# Patient Record
Sex: FEMALE | Race: WHITE | URBAN - METROPOLITAN AREA
[De-identification: names, ages, dates, MRNs, and addresses within clinical notes are randomized per-mention and may not be internally consistent; named-entity substitution may affect disease eponyms.]

---

## 2018-06-30 ENCOUNTER — ANESTHESIA (OUTPATIENT)
Dept: EMERGENCY MEDICINE | Facility: CLINIC | Age: 43
End: 2018-06-30
Payer: COMMERCIAL

## 2018-06-30 ENCOUNTER — HOSPITAL ENCOUNTER (EMERGENCY)
Facility: CLINIC | Age: 43
Discharge: HOME | End: 2018-07-01
Attending: EMERGENCY MEDICINE | Admitting: EMERGENCY MEDICINE
Payer: COMMERCIAL

## 2018-06-30 ENCOUNTER — TRANSFERRED RECORDS (OUTPATIENT)
Dept: HEALTH INFORMATION MANAGEMENT | Facility: CLINIC | Age: 43
End: 2018-06-30

## 2018-06-30 ENCOUNTER — HOSPITAL ENCOUNTER (EMERGENCY)
Facility: CLINIC | Age: 43
Discharge: HOME | End: 2018-06-30
Attending: FAMILY MEDICINE | Admitting: FAMILY MEDICINE
Payer: COMMERCIAL

## 2018-06-30 ENCOUNTER — ANESTHESIA EVENT (OUTPATIENT)
Dept: EMERGENCY MEDICINE | Facility: CLINIC | Age: 43
End: 2018-06-30
Payer: COMMERCIAL

## 2018-06-30 VITALS
OXYGEN SATURATION: 98 % | RESPIRATION RATE: 16 BRPM | TEMPERATURE: 99.1 F | SYSTOLIC BLOOD PRESSURE: 105 MMHG | DIASTOLIC BLOOD PRESSURE: 68 MMHG

## 2018-06-30 DIAGNOSIS — K12.0 ORAL APHTHAE: ICD-10-CM

## 2018-06-30 DIAGNOSIS — R51.9 ACUTE NONINTRACTABLE HEADACHE, UNSPECIFIED HEADACHE TYPE: ICD-10-CM

## 2018-06-30 DIAGNOSIS — R50.9 FEBRILE ILLNESS: ICD-10-CM

## 2018-06-30 DIAGNOSIS — J01.00 ACUTE MAXILLARY SINUSITIS, RECURRENCE NOT SPECIFIED: ICD-10-CM

## 2018-06-30 DIAGNOSIS — R50.9 FEVER AND CHILLS: ICD-10-CM

## 2018-06-30 DIAGNOSIS — R51.9 NONINTRACTABLE EPISODIC HEADACHE, UNSPECIFIED HEADACHE TYPE: ICD-10-CM

## 2018-06-30 DIAGNOSIS — M25.50 PAIN IN JOINT, MULTIPLE SITES: ICD-10-CM

## 2018-06-30 PROBLEM — G43.909 MIGRAINE: Status: ACTIVE | Noted: 2018-06-30

## 2018-06-30 PROBLEM — F10.20 ALCOHOL USE DISORDER, SEVERE, DEPENDENCE (H): Status: ACTIVE | Noted: 2018-03-14

## 2018-06-30 PROBLEM — F41.9 ANXIETY: Status: ACTIVE | Noted: 2018-06-30

## 2018-06-30 PROBLEM — K72.00 ACUTE LIVER FAILURE WITHOUT HEPATIC COMA: Status: ACTIVE | Noted: 2018-03-13

## 2018-06-30 PROBLEM — F10.10 ALCOHOL ABUSE: Status: ACTIVE | Noted: 2018-03-13

## 2018-06-30 PROBLEM — R87.612 LOW GRADE SQUAMOUS INTRAEPITHELIAL LESION (LGSIL) ON CERVICAL PAP SMEAR: Status: ACTIVE | Noted: 2017-08-01

## 2018-06-30 PROBLEM — F41.1 GAD (GENERALIZED ANXIETY DISORDER): Status: ACTIVE | Noted: 2018-03-14

## 2018-06-30 LAB
ALBUMIN UR-MCNC: 100 MG/DL
ANION GAP SERPL CALCULATED.3IONS-SCNC: 8 MMOL/L (ref 3–14)
APPEARANCE UR: ABNORMAL
BACTERIA #/AREA URNS HPF: ABNORMAL /HPF
BASOPHILS # BLD AUTO: 0 10E9/L (ref 0–0.2)
BASOPHILS NFR BLD AUTO: 0.2 %
BILIRUB UR QL STRIP: ABNORMAL
BUN SERPL-MCNC: 6 MG/DL (ref 7–30)
CALCIUM SERPL-MCNC: 8 MG/DL (ref 8.5–10.1)
CHLORIDE SERPL-SCNC: 107 MMOL/L (ref 94–109)
CO2 SERPL-SCNC: 21 MMOL/L (ref 20–32)
COLOR UR AUTO: ABNORMAL
CREAT SERPL-MCNC: 0.58 MG/DL (ref 0.52–1.04)
CRP SERPL-MCNC: 43.6 MG/L (ref 0–8)
DEPRECATED S PYO AG THROAT QL EIA: NORMAL
DIFFERENTIAL METHOD BLD: ABNORMAL
EOSINOPHIL # BLD AUTO: 0 10E9/L (ref 0–0.7)
EOSINOPHIL NFR BLD AUTO: 0 %
ERYTHROCYTE [DISTWIDTH] IN BLOOD BY AUTOMATED COUNT: 12.5 % (ref 10–15)
ERYTHROCYTE [SEDIMENTATION RATE] IN BLOOD BY WESTERGREN METHOD: 34 MM/H (ref 0–20)
GFR SERPL CREATININE-BSD FRML MDRD: >90 ML/MIN/1.7M2
GLUCOSE CSF-MCNC: 73 MG/DL (ref 40–70)
GLUCOSE SERPL-MCNC: 155 MG/DL (ref 70–99)
GLUCOSE UR STRIP-MCNC: NEGATIVE MG/DL
GRAM STN SPEC: NORMAL
HCT VFR BLD AUTO: 36.2 % (ref 35–47)
HGB BLD-MCNC: 11.9 G/DL (ref 11.7–15.7)
HGB UR QL STRIP: ABNORMAL
HYALINE CASTS #/AREA URNS LPF: 37 /LPF (ref 0–2)
IMM GRANULOCYTES # BLD: 0.1 10E9/L (ref 0–0.4)
IMM GRANULOCYTES NFR BLD: 0.5 %
KETONES UR STRIP-MCNC: 20 MG/DL
LEUKOCYTE ESTERASE UR QL STRIP: ABNORMAL
LYMPHOCYTES # BLD AUTO: 1.2 10E9/L (ref 0.8–5.3)
LYMPHOCYTES NFR BLD AUTO: 9.3 %
Lab: NORMAL
MCH RBC QN AUTO: 31.6 PG (ref 26.5–33)
MCHC RBC AUTO-ENTMCNC: 32.9 G/DL (ref 31.5–36.5)
MCV RBC AUTO: 96 FL (ref 78–100)
MONOCYTES # BLD AUTO: 1.2 10E9/L (ref 0–1.3)
MONOCYTES NFR BLD AUTO: 9.5 %
MUCOUS THREADS #/AREA URNS LPF: PRESENT /LPF
NEUTROPHILS # BLD AUTO: 10.4 10E9/L (ref 1.6–8.3)
NEUTROPHILS NFR BLD AUTO: 80.5 %
NITRATE UR QL: NEGATIVE
NRBC # BLD AUTO: 0 10*3/UL
NRBC BLD AUTO-RTO: 0 /100
PH UR STRIP: 5 PH (ref 5–7)
PLATELET # BLD AUTO: 130 10E9/L (ref 150–450)
POTASSIUM SERPL-SCNC: 3.2 MMOL/L (ref 3.4–5.3)
PROT CSF-MCNC: 10 MG/DL (ref 15–60)
RBC # BLD AUTO: 3.76 10E12/L (ref 3.8–5.2)
RBC #/AREA URNS AUTO: 7 /HPF (ref 0–2)
SODIUM SERPL-SCNC: 136 MMOL/L (ref 133–144)
SOURCE: ABNORMAL
SP GR UR STRIP: 1.03 (ref 1–1.03)
SPECIMEN SOURCE: NORMAL
SPECIMEN SOURCE: NORMAL
SQUAMOUS #/AREA URNS AUTO: 5 /HPF (ref 0–1)
UROBILINOGEN UR STRIP-MCNC: 4 MG/DL (ref 0–2)
WBC # BLD AUTO: 12.9 10E9/L (ref 4–11)
WBC #/AREA URNS AUTO: 22 /HPF (ref 0–5)

## 2018-06-30 PROCEDURE — 87086 URINE CULTURE/COLONY COUNT: CPT | Performed by: FAMILY MEDICINE

## 2018-06-30 PROCEDURE — 87880 STREP A ASSAY W/OPTIC: CPT | Performed by: FAMILY MEDICINE

## 2018-06-30 PROCEDURE — 99285 EMERGENCY DEPT VISIT HI MDM: CPT | Mod: Z6 | Performed by: EMERGENCY MEDICINE

## 2018-06-30 PROCEDURE — 87081 CULTURE SCREEN ONLY: CPT | Mod: XS | Performed by: FAMILY MEDICINE

## 2018-06-30 PROCEDURE — 25000128 H RX IP 250 OP 636: Performed by: FAMILY MEDICINE

## 2018-06-30 PROCEDURE — 87798 DETECT AGENT NOS DNA AMP: CPT | Performed by: FAMILY MEDICINE

## 2018-06-30 PROCEDURE — 82945 GLUCOSE OTHER FLUID: CPT | Performed by: FAMILY MEDICINE

## 2018-06-30 PROCEDURE — 99285 EMERGENCY DEPT VISIT HI MDM: CPT | Mod: 25 | Performed by: FAMILY MEDICINE

## 2018-06-30 PROCEDURE — 99285 EMERGENCY DEPT VISIT HI MDM: CPT | Mod: Z6 | Performed by: FAMILY MEDICINE

## 2018-06-30 PROCEDURE — 87207 SMEAR SPECIAL STAIN: CPT | Performed by: FAMILY MEDICINE

## 2018-06-30 PROCEDURE — 99285 EMERGENCY DEPT VISIT HI MDM: CPT | Performed by: EMERGENCY MEDICINE

## 2018-06-30 PROCEDURE — 96375 TX/PRO/DX INJ NEW DRUG ADDON: CPT | Performed by: EMERGENCY MEDICINE

## 2018-06-30 PROCEDURE — 96374 THER/PROPH/DIAG INJ IV PUSH: CPT | Performed by: EMERGENCY MEDICINE

## 2018-06-30 PROCEDURE — 96361 HYDRATE IV INFUSION ADD-ON: CPT | Performed by: FAMILY MEDICINE

## 2018-06-30 PROCEDURE — 36415 COLL VENOUS BLD VENIPUNCTURE: CPT | Performed by: FAMILY MEDICINE

## 2018-06-30 PROCEDURE — 25000128 H RX IP 250 OP 636: Performed by: EMERGENCY MEDICINE

## 2018-06-30 PROCEDURE — 25000125 ZZHC RX 250: Performed by: NURSE ANESTHETIST, CERTIFIED REGISTERED

## 2018-06-30 PROCEDURE — 87015 SPECIMEN INFECT AGNT CONCNTJ: CPT | Performed by: FAMILY MEDICINE

## 2018-06-30 PROCEDURE — 89050 BODY FLUID CELL COUNT: CPT | Performed by: FAMILY MEDICINE

## 2018-06-30 PROCEDURE — 86140 C-REACTIVE PROTEIN: CPT | Performed by: EMERGENCY MEDICINE

## 2018-06-30 PROCEDURE — 86618 LYME DISEASE ANTIBODY: CPT | Performed by: FAMILY MEDICINE

## 2018-06-30 PROCEDURE — 81001 URINALYSIS AUTO W/SCOPE: CPT | Performed by: EMERGENCY MEDICINE

## 2018-06-30 PROCEDURE — 37000011 ZZH ANESTHESIA WARD SERVICE: Performed by: NURSE ANESTHETIST, CERTIFIED REGISTERED

## 2018-06-30 PROCEDURE — 96374 THER/PROPH/DIAG INJ IV PUSH: CPT | Performed by: FAMILY MEDICINE

## 2018-06-30 PROCEDURE — 80048 BASIC METABOLIC PNL TOTAL CA: CPT | Performed by: EMERGENCY MEDICINE

## 2018-06-30 PROCEDURE — 25000132 ZZH RX MED GY IP 250 OP 250 PS 637: Performed by: FAMILY MEDICINE

## 2018-06-30 PROCEDURE — 87205 SMEAR GRAM STAIN: CPT | Performed by: FAMILY MEDICINE

## 2018-06-30 PROCEDURE — 96375 TX/PRO/DX INJ NEW DRUG ADDON: CPT | Performed by: FAMILY MEDICINE

## 2018-06-30 PROCEDURE — 40000671 ZZH STATISTIC ANESTHESIA CASE

## 2018-06-30 PROCEDURE — 84157 ASSAY OF PROTEIN OTHER: CPT | Performed by: FAMILY MEDICINE

## 2018-06-30 PROCEDURE — 87070 CULTURE OTHR SPECIMN AEROBIC: CPT | Mod: XS | Performed by: FAMILY MEDICINE

## 2018-06-30 PROCEDURE — 62270 DX LMBR SPI PNXR: CPT | Performed by: FAMILY MEDICINE

## 2018-06-30 PROCEDURE — 85025 COMPLETE CBC W/AUTO DIFF WBC: CPT | Performed by: EMERGENCY MEDICINE

## 2018-06-30 RX ORDER — GABAPENTIN 300 MG/1
600 CAPSULE ORAL
Status: ON HOLD | COMMUNITY
Start: 2018-03-23 | End: 2019-07-17

## 2018-06-30 RX ORDER — SODIUM CHLORIDE 9 MG/ML
1000 INJECTION, SOLUTION INTRAVENOUS CONTINUOUS
Status: DISCONTINUED | OUTPATIENT
Start: 2018-06-30 | End: 2018-06-30 | Stop reason: HOSPADM

## 2018-06-30 RX ORDER — LIDOCAINE HYDROCHLORIDE 10 MG/ML
INJECTION, SOLUTION INFILTRATION; PERINEURAL PRN
Status: DISCONTINUED | OUTPATIENT
Start: 2018-06-30 | End: 2018-06-30

## 2018-06-30 RX ORDER — KETOROLAC TROMETHAMINE 30 MG/ML
15 INJECTION, SOLUTION INTRAMUSCULAR; INTRAVENOUS ONCE
Status: COMPLETED | OUTPATIENT
Start: 2018-06-30 | End: 2018-06-30

## 2018-06-30 RX ORDER — ACETAMINOPHEN 500 MG
1000 TABLET ORAL ONCE
Status: COMPLETED | OUTPATIENT
Start: 2018-06-30 | End: 2018-06-30

## 2018-06-30 RX ORDER — HYDROXYZINE HYDROCHLORIDE 25 MG/1
50 TABLET, FILM COATED ORAL ONCE
Status: COMPLETED | OUTPATIENT
Start: 2018-06-30 | End: 2018-06-30

## 2018-06-30 RX ORDER — POTASSIUM CHLORIDE 1500 MG/1
40 TABLET, EXTENDED RELEASE ORAL ONCE
Status: COMPLETED | OUTPATIENT
Start: 2018-06-30 | End: 2018-06-30

## 2018-06-30 RX ORDER — DOXYCYCLINE 100 MG/1
100 CAPSULE ORAL 2 TIMES DAILY
Qty: 28 CAPSULE | Refills: 0 | Status: SHIPPED | OUTPATIENT
Start: 2018-06-30 | End: 2018-07-14

## 2018-06-30 RX ORDER — PROPRANOLOL HYDROCHLORIDE 10 MG/1
10 TABLET ORAL 3 TIMES DAILY
Status: ON HOLD | COMMUNITY
Start: 2018-05-18 | End: 2019-07-17

## 2018-06-30 RX ORDER — HYDROXYZINE PAMOATE 25 MG/1
25 CAPSULE ORAL EVERY 8 HOURS PRN
Status: ON HOLD | COMMUNITY
Start: 2018-03-23 | End: 2019-07-17

## 2018-06-30 RX ORDER — ACETAMINOPHEN 500 MG
1000 TABLET ORAL ONCE
Status: DISCONTINUED | OUTPATIENT
Start: 2018-06-30 | End: 2018-06-30 | Stop reason: HOSPADM

## 2018-06-30 RX ORDER — ACAMPROSATE CALCIUM 333 MG/1
666 TABLET, DELAYED RELEASE ORAL 3 TIMES DAILY
Status: ON HOLD | COMMUNITY
End: 2019-07-17

## 2018-06-30 RX ADMIN — PROCHLORPERAZINE EDISYLATE 10 MG: 5 INJECTION INTRAMUSCULAR; INTRAVENOUS at 15:36

## 2018-06-30 RX ADMIN — POTASSIUM CHLORIDE 40 MEQ: 1500 TABLET, EXTENDED RELEASE ORAL at 15:05

## 2018-06-30 RX ADMIN — KETOROLAC TROMETHAMINE 15 MG: 30 INJECTION, SOLUTION INTRAMUSCULAR at 13:04

## 2018-06-30 RX ADMIN — SODIUM CHLORIDE 1000 ML: 9 INJECTION, SOLUTION INTRAVENOUS at 12:00

## 2018-06-30 RX ADMIN — HYDROXYZINE HYDROCHLORIDE 50 MG: 25 TABLET ORAL at 13:03

## 2018-06-30 RX ADMIN — SODIUM CHLORIDE 1000 ML: 9 INJECTION, SOLUTION INTRAVENOUS at 13:08

## 2018-06-30 RX ADMIN — LIDOCAINE HYDROCHLORIDE 100 MG: 10 INJECTION, SOLUTION INFILTRATION; PERINEURAL at 12:51

## 2018-06-30 RX ADMIN — ACETAMINOPHEN 1000 MG: 500 TABLET ORAL at 14:22

## 2018-06-30 RX ADMIN — Medication 1 DROP: at 15:04

## 2018-06-30 ASSESSMENT — ENCOUNTER SYMPTOMS
WEAKNESS: 0
PHOTOPHOBIA: 0
CHILLS: 1
MYALGIAS: 1
NECK PAIN: 1
HEADACHES: 1
BLOOD IN STOOL: 0
NECK STIFFNESS: 1
CONSTIPATION: 0
FATIGUE: 1
DIARRHEA: 1
WHEEZING: 0
ARTHRALGIAS: 1
NUMBNESS: 0
COUGH: 0
DYSURIA: 0
FREQUENCY: 0
SHORTNESS OF BREATH: 0
DIAPHORESIS: 0
SINUS PRESSURE: 1
PALPITATIONS: 0
NAUSEA: 0
FEVER: 1
RHINORRHEA: 0
VOMITING: 0
DIZZINESS: 0
ABDOMINAL PAIN: 0
SORE THROAT: 1

## 2018-06-30 ASSESSMENT — PAIN DESCRIPTION - DESCRIPTORS: DESCRIPTORS: SHARP

## 2018-06-30 NOTE — ANESTHESIA POSTPROCEDURE EVALUATION
Patient: Lima Renteria    * No procedures listed *    Diagnosis:* No pre-op diagnosis entered *  Diagnosis Additional Information: No value filed.    Anesthesia Type:  Spinal    Note:  Anesthesia Post Evaluation    Patient location during evaluation: Bedside  Patient participation: Able to fully participate in evaluation  Level of consciousness: awake and alert  Pain management: adequate  Airway patency: patent  Cardiovascular status: acceptable  Respiratory status: acceptable  Hydration status: acceptable  PONV: none     Anesthetic complications: None          Last vitals:  Vitals:    06/30/18 1135 06/30/18 1230   BP: 101/68 115/68   Resp: 16    Temp: 36.7  C (98  F)    SpO2: 100%          Electronically Signed By: Chris Salazar CRNA, APRN CRNA  June 30, 2018  1:05 PM

## 2018-06-30 NOTE — ANESTHESIA CARE TRANSFER NOTE
Patient: Lima Renteria    * No procedures listed *    Diagnosis: * No pre-op diagnosis entered *  Diagnosis Additional Information: No value filed.    Anesthesia Type:   Spinal     Note:    Patient transferred to:Emergency Department  Handoff Report: Identifed the Patient, Identified the Reponsible Provider, Reviewed the pertinent medical history, Discussed the surgical course, Reviewed Intra-OP anesthesia mangement and issues during anesthesia, Set expectations for post-procedure period and Allowed opportunity for questions and acknowledgement of understanding      Vitals: (Last set prior to Anesthesia Care Transfer)              Electronically Signed By: Chris Salazar CRNA, APRN CRNA  June 30, 2018  1:04 PM

## 2018-06-30 NOTE — ANESTHESIA PREPROCEDURE EVALUATION
Anesthesia Plan  Procedure only, no anesthetic delivered    History & Physical Review  History and physical reviewed and following examination; no interval change.    ASA Status:  2 .    NPO Status:  > 6 hours    Plan for Spinal     LP only      Postoperative Care      Consents  Anesthetic plan, risks, benefits and alternatives discussed with:  Patient..                          .

## 2018-06-30 NOTE — ED AVS SNAPSHOT
[facility] Emergency Department    [address] [address]    Phone:  [phone]    Fax:  [phone]                                       Lima Renteria   MRN: [id number]    Department:  [facility] Emergency Department   Date of Visit:  6/30/2018           After Visit Summary Signature Page     I have received my discharge instructions, and my questions have been answered. I have discussed any challenges I see with this plan with the nurse or doctor.    ..........................................................................................................................................  Patient/Patient Representative Signature      ..........................................................................................................................................  Patient Representative Print Name and Relationship to Patient    ..................................................               ................................................  Date                                            Time    ..........................................................................................................................................  Reviewed by Signature/Title    ...................................................              ..............................................  Date                                                            Time

## 2018-06-30 NOTE — ED NOTES
Lab results called - Spinal tap preliminary report- Gram stain- ngative. Results to Alireza Perez MD

## 2018-06-30 NOTE — ED AVS SNAPSHOT
[facility] Emergency Department    [address] [address]    Phone:  [phone]    Fax:  [phone]                                       Lima Renteria   MRN: [id number]    Department:  [facility] Emergency Department   Date of Visit:  6/30/2018           Patient Information     Date Of Birth          1975        Your diagnoses for this visit were:     Febrile illness unclear cause.  maintain hydration. ibuprofen, tylenol for fever.  due to neck stiffness, fever. headache - lumbar puncture was done and was reassuring. consider tick borne illness and tests are pending for this. take doxycycline 100 mg twice daily for 14 days.  consider repeat klabs - crp and cbc monday.  check results for pending labs at that time.    Nonintractable episodic headache, unspecified headache type     Acute maxillary sinusitis, recurrence not specified This is short-term and is likely viral kunal. with onset <4 days. Use nasal saline frequently and neosynephrine every 6 hours as needed for up to 3 days.    Oral aphthae unclear if these are related to current illness.  avoid SLS toothpaste.    Pain in joint, multiple sites        You were seen by Alireza Perez MD.      Follow-up Information     Follow up with doctor at [facility] In 2 days.        Follow up with [facility] Emergency [facility].    Specialty:  EMERGENCY MEDICINE    Why:  As needed, If symptoms worsen    Contact information:    [address] [place] [id number] [phone]    Additional information:    The medical center is located at   [address] [facility]. (between [address] in [place], [place] north   of [place]).        Discharge Instructions         ICD-10-CM    1. Febrile illness R50.9     unclear cause.  maintain hydration. ibuprofen, tylenol for fever.  due to neck stiffness, fever. headache - lumbar puncture was done and was reassuring. consider tick borne illness and tests are pending for  this. take doxycycline 100 mg twice daily for 14 days.  consider repeat klabs - crp and cbc monday.  check results for pending labs at that time.   2. Nonintractable episodic headache, unspecified headache type R51    3. Acute maxillary sinusitis, recurrence not specified J01.00     This is short-term and is likely viral kunal. with onset <4 days. Use nasal saline frequently and neosynephrine every 6 hours as needed for up to 3 days.   4. Oral aphthae K12.0     unclear if these are related to current illness.  avoid SLS toothpaste.         Febrile Illness with Uncertain Cause (Adult)  You have a fever, but the cause is unknown. A fever is a natural reaction of the body to an illness such as infection due to a virus or bacteria. Sometimes other conditions such as cancer or immune diseases can cause fever, especially if the fever has lasted for more than a week or 2. In most cases, the temperature itself is not harmful. It actually helps the body fight infections. A fever does not need to be treated unless you feel very uncomfortable.  Sometimes a fever can be an early sign of a more serious infection, so make sure to follow up if your condition worsens.  Home care  Unless given other instructions by your healthcare provider, follow these guidelines when caring for yourself at home.  General care    If your symptoms are not severe, rest at home for the first 2 to 3 days. When you resume activity, don't let yourself get too tired.    For your overall health, don't smoke. Also avoid being exposed to secondhand smoke.    Your appetite may be poor, so a light diet is fine. Avoid dehydration by drinking 6 to 8 glasses of fluids per day (such as water, soft drinks, sports drinks, juices, tea, or soup). If you have congestion, extra fluids will help loosen secretions in the nose and lungs.  Medicines    You can take acetaminophen or ibuprofen for pain or to lower your temperature, unless you were given a different medicine to  use. (Note: If you have chronic liver or kidney disease or have ever had a stomach ulcer or gastrointestinal bleeding, talk with your healthcare provider before using these medicines. Also talk to your provider if you are taking medicine to prevent blood clots.) Aspirin should never be given to anyone younger than 18 years of age who is ill with a viral infection or fever. It may cause severe liver or brain damage.    If you were given antibiotics for an infection, take them until they are used up, or your healthcare provider tells you to stop. It is important to finish the antibiotics even though you feel better. This is to make sure the infection has cleared. Be aware that antibiotics are not usually given for a viral infection or a fever with an unknown cause.    Over-the-counter medicines will not shorten the duration of the illness. However, they may be helpful for the following symptoms: cough, sore throat, or nasal and sinus congestion. Ask your pharmacist for product suggestions. (Note: Don't use decongestants if you have high blood pressure.)  Follow-up care  Follow up with your healthcare provider, or as advised.    If a culture or other lab tests were done, you will be notified if your treatment needs to be changed. You can call as directed for the results.    If X-rays, a CT, or an ultrasound were done, a specialist will review them. You will be notified of any findings that may affect your care.  Call 911  Call 911 if any of these occur:    Trouble breathing or swallowing, or wheezing    Chest pain    Confusion    Extreme drowsiness or trouble awakening    Fainting or loss of consciousness    Rapid heart rate    Low blood pressure    Vomiting blood, or large amounts of blood in stool    Seizure  When to seek medical advice  Call your healthcare provider right away if any of these occur:    Cough with lots of colored sputum (mucus) or blood in your sputum    Severe headache    Face, neck, throat, or ear  pain    Feeling drowsy    Abdominal pain    Repeated vomiting or diarrhea    Joint pain or a new rash    Burning when urinating    Fever of 100.4 F (38 C) or higher, or as directed by your healthcare provider    Feeling weak or dizzy  Date Last Reviewed: 11/1/2017 2000-2017 [employer]. [address] [place] [address]. All rights reserved. This information is not intended as a substitute for professional medical care. Always follow your healthcare professional's instructions.        Understanding Canker Sores  Canker sores are small, painful sores inside the mouth. They occur most often on the tongue, gums, or insides of the cheeks. The medical term for canker sores is aphthous ulcers.  What causes a canker sore?  The exact cause of canker sores is not known, but they are linked to a number of conditions. These include:    An injury or irritation in the mouth, such as biting the inside of your cheek or braces rubbing    Allergy or sensitivity to certain foods or substances, such as citrus juice or some kinds of toothpaste    Poor nutrition    Emotional stress    Certain infections and illnesses  Canker sores tend to run in families.  What are the symptoms of a canker sore?  These are some common traits of canker sores:    Sores are open and grayish-yellow, surrounded by redness.    Sores are usually painful and sensitive to touch.    Canker sores may be preceded by a burning or tingling sensation a few hours to a few days before the sore appears.    Children and teens tend to get canker sores more often than adults.  How are canker sores treated?  Canker sores usually go away by themselves within 10 to 14 days. There is no cure for canker sores. Treatment focuses on relieving symptoms and shortening outbreaks. Treatments may include:    Prescription or over-the-counter skin treatments to apply to the sores. Steroids for your skin (topical) may protect the canker sores from further  irritation and allow them to heal. Topical pain relief medicines may numb the area and make the sores less painful.    Certain types of toothpaste. These do not contain sodium lauryl sulfate. This type of toothpaste may prevent further aggravation of canker sores.    Oral prescription medicines. These are used for severe cases to help relieve symptoms.    Prescription or over-the-counter pain medicines. These help with discomfort.  What are the complications of a canker sore?  Mouth sores that seem to be canker sores can be signs of a more serious illness. If you have other signs of illness along with mouth sores, you should talk with a healthcare provider. Canker sores can be so painful that they interfere with talking, eating, or drinking.  When should I call my healthcare provider?  Call your healthcare provider right away if you have any of these:    Canker sores that don t go away after 2 weeks    Canker sores that come back more than 3 times a year    Canker sores that are larger than about a half-inch across    Fever of 100.4 F (38 C) or higher, or as directed    Pain that gets worse    You aren t able to eat or drink because of painful sores    Symptoms that don t get better, or symptoms that get worse    New symptoms   Date Last Reviewed: 5/1/2016 2000-2017 [employer]. [address] [place] [address], [place] [address]. All rights reserved. This information is not intended as a substitute for professional medical care. Always follow your healthcare professional's instructions.          Self-Care for Headaches  Most headaches aren't serious and can be relieved with self-care. But some headaches may be a sign of another health problem like eye trouble or high blood pressure. To find the best treatment, learn what kind of headaches you get. For tension headaches, self-care will usually help. To treat migraines, ask your healthcare provider for advice. It is also possible to get both tension and  "migraine headaches. Self-care involves relieving the pain and avoiding headache  triggers  if you can.    Ways to reduce pain and tension  Try these steps:    Apply a cold compress or ice pack to the pain site.    Drink fluids. If nausea makes it hard to drink, try sucking on ice.    Rest. Protect yourself from bright light and loud noises.    Calm your emotions by imagining a peaceful scene.    Massage tight neck, shoulder, and head muscles.    To relax muscles, soak in a hot bath or use a hot shower.  Use medicines  Aspirin or aspirin substitutes, such as ibuprofen and acetaminophen, can relieve headache. Remember: Never give aspirin to anyone 18 years old or younger because of the risk of developing Reye syndrome. Use pain medicines only when necessary.  Track your headaches  Keeping a headache diary can help you and your healthcare provider identify what's causing your headaches:    Note when each headache happens.    Identify your activities and the foods you've eaten 6 to 8 hours before the headache began.    Look for any trends or \"triggers.\"  Signs of tension headache  Any of the following can be signs:    Dull pain or feeling of pressure in a tight band around your head    Pain in your neck or shoulders    Headache without a definite beginning or end    Headache after an activity such as driving or working on a computer  Signs of migraine  Any of the following can be signs:    Throbbing pain on one or both sides of your head    Nausea or vomiting    Extreme sensitivity to light, sound, and smells    Bright spots, flashes, or other visual changes    Pain or nausea so severe that you can't continue your daily activities  Call your healthcare provider   If you have any of the following symptoms, contact your healthcare provider:    A headache that lingers after a recent injury or bump to the head.    A fever with a stiff neck or pain when you bend your head toward your chest.    A headache along with slurred " "speech, changes in your vision, or numbness or weakness in your arms or legs.    A headache for longer than 3 days.    Frequent headaches, especially in the morning.    Headaches with seizures     Seek immediate medical attention if you have a headache that you would call \"the worst headache you have ever had.\"   Date Last Reviewed: 10/4/2015    0846-0430 [employer]. [address] [place] [address]. All rights reserved. This information is not intended as a substitute for professional medical care. Always follow your healthcare professional's instructions.            24 Hour Appointment Hotline       To make an appointment at any [facility], call [phone] ([phone]). If you don't have a family doctor or clinic, we will help you find one. [place] clinics are conveniently located to serve the needs of you and your family.             Review of your medicines      START taking        Dose / Directions Last dose taken    doxycycline 100 MG capsule   Commonly known as:  VIBRAMYCIN   Dose:  100 mg   Quantity:  28 capsule        Take 1 capsule (100 mg) by mouth 2 times daily for 14 days   Refills:  0          Our records show that you are taking the medicines listed below. If these are incorrect, please call your family doctor or clinic.        Dose / Directions Last dose taken    acamprosate 333 MG EC tablet   Commonly known as:  CAMPRAL   Dose:  666 mg        Take 666 mg by mouth 3 times daily   Refills:  0        ESCITALOPRAM OXALATE PO   Dose:  10 mg        Take 10 mg by mouth daily   Refills:  0        gabapentin 300 MG capsule   Commonly known as:  NEURONTIN   Dose:  600 mg        Take 600 mg by mouth   Refills:  0        hydrOXYzine 25 MG capsule   Commonly known as:  VISTARIL   Dose:  25 mg        Take 25 mg by mouth every 8 hours as needed   Refills:  0        IBUPROFEN PO   Dose:  600 mg        Take 600 mg by mouth every 6 hours as needed for moderate pain   Refills:  0 "        propranolol 10 MG tablet   Commonly known as:  INDERAL   Dose:  10 mg        Take 10 mg by mouth 3 times daily   Refills:  0        THIAMINE HCL PO   Dose:  100 mg        Take 100 mg by mouth daily   Refills:  0                Prescriptions were sent or printed at these locations (1 Prescription)                   [facility] [place] - [address]   [address] [facility] [address] [address]    Telephone:  [phone]   Fax:  [phone]   Hours:                  E-Prescribed (1 of 1)         doxycycline (VIBRAMYCIN) 100 MG capsule                Procedures and tests performed during your visit     Anesthesiology IP Consult: Patient to be seen: ASAP - within 4 hours; Call back #: neck stiffness,fever, headache; Lumbar puncture; Consultant may enter orders: Yes    Babesia Species by PCR    Basic metabolic panel    Beta strep group A culture    CBC with platelets differential    CRP inflammation    CSF Culture Aerobic Bacterial    Cell count with differential CSF: Tube 4    Ehrlichia Anaplasma Sp by PCR    Erythrocyte sedimentation rate auto    Glucose CSF: Tube 2    Gram stain    Lyme Disease Kayla with reflex to WB Serum    Parasite stain    Parvovirus B19 DNA PCR (Blood or Bone Marrow)    Protein total CSF: Tube 2    Rapid Strep Screen    UA reflex to Microscopic    Urine Culture      Orders Needing Specimen Collection     None      Pending Results     Date and Time Order Name Status Description    6/30/2018 1530 Beta strep group A culture In process     6/30/2018 1235 Ehrlichia Anaplasma Sp by PCR In process     6/30/2018 1234 Babesia Species by PCR In process     6/30/2018 1234 Parvovirus B19 DNA PCR (Blood or Bone Marrow) In process     6/30/2018 1227 CSF Culture Aerobic Bacterial In process     6/30/2018 1227 Gram stain Preliminary     6/30/2018 1226 Parasite stain In process     6/30/2018 1226 Lyme Disease Kayla with reflex to WB Serum In process     6/30/2018 1226 Urine Culture In  process             Pending Culture Results     Date and Time Order Name Status Description    6/30/2018 1530 Beta strep group A culture In process     6/30/2018 1234 Parvovirus B19 DNA PCR (Blood or Bone Marrow) In process     6/30/2018 1227 CSF Culture Aerobic Bacterial In process     6/30/2018 1227 Gram stain Preliminary     6/30/2018 1226 Parasite stain In process     6/30/2018 1226 Urine Culture In process             Pending Results Instructions     If you had any lab results that were not finalized at the time of your Discharge, you can call the ED Lab Result RN at [phone]. You will be contacted by this team for any positive Lab results or changes in treatment. The nurses are available 7 days a week from 10A to 6:30P.  You can leave a message 24 hours per day and they will return your call.        Test Results From Your Hospital Stay        6/30/2018 12:06 PM      Component Results     Component Value Ref Range & Units Status    Color Urine Veda  Final    Appearance Urine Slightly Cloudy  Final    Glucose Urine Negative NEG^Negative mg/dL Final    Bilirubin Urine Small (A) NEG^Negative Final    This is an unconfirmed screening test result. A positive result may be false.    Ketones Urine 20 (A) NEG^Negative mg/dL Final    Specific Gravity Urine 1.028 1.003 - 1.035 Final    Blood Urine Small (A) NEG^Negative Final    pH Urine 5.0 5.0 - 7.0 pH Final    Protein Albumin Urine 100 (A) NEG^Negative mg/dL Final    Urobilinogen mg/dL 4.0 (H) 0.0 - 2.0 mg/dL Final    Nitrite Urine Negative NEG^Negative Final    Leukocyte Esterase Urine Small (A) NEG^Negative Final    Source Voided Urine  Final    RBC Urine 7 (H) 0 - 2 /HPF Final    WBC Urine 22 (H) 0 - 5 /HPF Final    Bacteria Urine Few (A) NEG^Negative /HPF Final    Squamous Epithelial /HPF Urine 5 (H) 0 - 1 /HPF Final    Mucous Urine Present (A) NEG^Negative /LPF Final    Hyaline Casts 37 (H) 0 - 2 /LPF Final         6/30/2018 12:18 PM      Component Results      Component Value Ref Range & Units Status    WBC 12.9 (H) 4.0 - 11.0 10e9/L Final    RBC Count 3.76 (L) 3.8 - 5.2 10e12/L Final    Hemoglobin 11.9 11.7 - 15.7 g/dL Final    Hematocrit 36.2 35.0 - 47.0 % Final    MCV 96 78 - 100 fl Final    MCH 31.6 26.5 - 33.0 pg Final    MCHC 32.9 31.5 - 36.5 g/dL Final    RDW 12.5 10.0 - 15.0 % Final    Platelet Count 130 (L) 150 - 450 10e9/L Final    Diff Method Automated Method  Final    % Neutrophils 80.5 % Final    % Lymphocytes 9.3 % Final    % Monocytes 9.5 % Final    % Eosinophils 0.0 % Final    % Basophils 0.2 % Final    % Immature Granulocytes 0.5 % Final    Nucleated RBCs 0 0 /100 Final    Absolute Neutrophil 10.4 (H) 1.6 - 8.3 10e9/L Final    Absolute Lymphocytes 1.2 0.8 - 5.3 10e9/L Final    Absolute Monocytes 1.2 0.0 - 1.3 10e9/L Final    Absolute Eosinophils 0.0 0.0 - 0.7 10e9/L Final    Absolute Basophils 0.0 0.0 - 0.2 10e9/L Final    Abs Immature Granulocytes 0.1 0 - 0.4 10e9/L Final    Absolute Nucleated RBC 0.0  Final         6/30/2018 12:28 PM      Component Results     Component Value Ref Range & Units Status    Sodium 136 133 - 144 mmol/L Final    Potassium 3.2 (L) 3.4 - 5.3 mmol/L Final    Chloride 107 94 - 109 mmol/L Final    Carbon Dioxide 21 20 - 32 mmol/L Final    Anion Gap 8 3 - 14 mmol/L Final    Glucose 155 (H) 70 - 99 mg/dL Final    Urea Nitrogen 6 (L) 7 - 30 mg/dL Final    Creatinine 0.58 0.52 - 1.04 mg/dL Final    GFR Estimate >90 >60 mL/min/1.7m2 Final    Non  GFR Calc    GFR Estimate If Black >90 >60 mL/min/1.7m2 Final    African American GFR Calc    Calcium 8.0 (L) 8.5 - 10.1 mg/dL Final         6/30/2018 12:53 PM         6/30/2018  1:10 PM      Component Results     Component Value Ref Range & Units Status    Sed Rate 34 (H) 0 - 20 mm/h Final         6/30/2018 12:32 PM         6/30/2018  1:39 PM         6/30/2018  2:00 PM      Component Results     Component Value Ref Range & Units Status    Glucose CSF 73 (H) 40 - 70 mg/dL  "Final    CSF glucose concentrations are about 60 percent of normal plasma glucose.         6/30/2018  1:42 PM      Component Results     Component Value Ref Range & Units Status    Protein Total CSF 10 (L) 15 - 60 mg/dL Final         6/30/2018  3:33 PM      Component Results     Component    Specimen Description    Cerebrospinal fluid    Gram Stain    No organisms seen    Gram Stain    No WBC's seen         6/30/2018  1:22 PM         6/30/2018  2:24 PM      Component Results     Component Value Ref Range & Units Status    WBC CSF 1 0 - 5 /uL Final    RBC CSF 0 0 - 2 /uL Final    Tube Number 4 # Final    Volume 1 mL Final    Color CSF Colorless CLRL^Colorless Final    Appearance CSF Clear CLER^Clear Final         6/30/2018 12:42 PM      Component Results     Component Value Ref Range & Units Status    CRP Inflammation 43.6 (H) 0.0 - 8.0 mg/L Final         6/30/2018  2:10 PM         6/30/2018  2:10 PM         6/30/2018  2:11 PM         6/30/2018  3:52 PM      Component Results     Component    Specimen Description    Throat    Rapid Strep A Screen    NEGATIVE: No Group A streptococcal antigen detected by immunoassay, await culture report.         6/30/2018  3:53 PM                Thank you for choosing [facility]       Thank you for choosing [facility] for your care. Our goal is always to provide you with excellent care. Hearing back from our patients is one way we can continue to improve our services. Please take a few minutes to complete the written survey that you may receive in the mail after you visit with us. Thank you!        [employer] Information     [employer] lets you send messages to your doctor, view your test results, renew your prescriptions, schedule appointments and more. To sign up, go to [email or web address] . Click on \"Log in\" on the left side of the screen, which will take you to the Welcome page. Then click on \"Sign up Now\" on the right side of the page.     You will be asked to enter the access code " listed below, as well as some personal information. Please follow the directions to create your username and password.     Your access code is: [email or web address]  Expires: 2018  4:54 PM     Your access code will  in 90 days. If you need help or a new code, please call your [place] clinic or [phone].        Care EveryWhere ID     This is your Care EveryWhere ID. This could be used by other organizations to access your [facility] medical records  [id number]        Equal Access to Services     [facility]: Yu Ramos, edi murry, joe wardalyann chew, yara silveira . So [facility] [phone].    ATENCIÓN: Si habla español, tiene a daniel disposición servicios gratuitos de asistencia lingüística. Llame al [phone].    We comply with applicable federal civil rights laws and [place] laws. We do not discriminate on the basis of race, color, national origin, age, disability, sex, sexual orientation, or gender identity.            After Visit Summary       This is your record. Keep this with you and show to your community pharmacist(s) and doctor(s) at your next visit.

## 2018-06-30 NOTE — ED PROVIDER NOTES
"  History     Chief Complaint   Patient presents with     Headache     stiff neck and fever x 2 days   -also all joints ache     HPI  Lima Renteria is a 42 year old female who has a history of arthritis, acute liver failure without hepatic coma, alcohol abuse, anxiety, asthma, LAMAR, low grade squamous intraepithelial lesion, migraine, and thyroid nodule who presents to the ED for evaluation of headache, stiff neck and fever for two days. Patient presents from [facility] where she has been for the last 3 weeks.Patient reports 48 hours of fever, headache, chills, neck stiffness, and joint pain. She notes that this started with one day of vertigo 48 hours ago. She states that she feels like she has influenza without a cough. She does have oral lesions and LT maxillary/frontal sinus pressure. Patient reports some increased fatigue in the last 48 hours. Regarding her joint pain, her bialteral knees and hips have worse joint pain than other areas. She notes diarrhea the last few days, but notes that this is mild - 1-2 stools that are loose. She states \" I don't feel ill, but it just feels like my whole body is under attack.\"  No [place] contagious contacts but is at [facility]    She also reports sores in her mouth. She has a history of canker sores, no history of herpes simplex. The sores that the patient has now are similar to prior canker sores. She notes that she has one sore in the vaginal area, but attributes this to wearing jessi shorts without underwear.     She denies nausea, vomiting, sinus drainage, congestion, cough, wheezing, blood in the stools, black tarry stools, photophobia, sensation changes, imbalance, weakness in the extremities, changes in speech or swallowing, known insect bites.   She denies history of meningitis or encephalitis, seizure, stroke.   She is on propranolol for tachycardia.     .  monogamous with  for 20+ years.  No HIV risks.  No known tick bites.  denies current " pregnancy    Problem List:    Patient Active Problem List    Diagnosis Date Noted     Anxiety 06/30/2018     Priority: Medium     Migraine 06/30/2018     Priority: Medium     Alcohol use disorder, severe, dependence (H) 03/14/2018     Priority: Medium     LAMAR (generalized anxiety disorder) 03/14/2018     Priority: Medium     Acute liver failure without hepatic coma 03/13/2018     Priority: Medium     Alcohol abuse 03/13/2018     Priority: Medium     Low grade squamous intraepithelial lesion (LGSIL) on cervical Pap smear 08/01/2017     Priority: Medium     Overview:   8/2017; colp advised       Arthralgia of multiple joints 05/05/2016     Priority: Medium     Chronic midline thoracic back pain 05/05/2016     Priority: Medium     Thyroid nodule 10/06/2014     Priority: Medium     Overview:   2 nodules noted on US on 10/1/14. Need follow (FNA vs repeat US).        Pain medication agreement 04/10/2014     Priority: Medium     Overview:   Percocet 5-325 mg  - 90 tablets to last for 30 days as of 6/20/2014.  Follows with ortho and also rheumatology as well.        Asthma 03/13/2014     Priority: Medium        Past Medical History:    No past medical history on file.    Past Surgical History:    No past surgical history on file.    Family History:    No family history on file.    Social History:  Marital Status:  Single [1]  Social History   Substance Use Topics     Smoking status: Not on file     Smokeless tobacco: Not on file     Alcohol use Not on file        Medications:      No current outpatient prescriptions on file.      Review of Systems   Constitutional: Positive for chills, fatigue and fever. Negative for diaphoresis.   HENT: Positive for mouth sores, sinus pressure and sore throat. Negative for congestion, ear pain, postnasal drip and rhinorrhea.    Eyes: Negative for photophobia and visual disturbance.   Respiratory: Negative for cough, shortness of breath and wheezing.    Cardiovascular: Negative for chest  pain and palpitations.   Gastrointestinal: Positive for diarrhea. Negative for abdominal pain, blood in stool, constipation, nausea and vomiting.   Genitourinary: Negative for dysuria, frequency and urgency.   Musculoskeletal: Positive for arthralgias, myalgias, neck pain and neck stiffness.   Skin: Negative for rash.   Neurological: Positive for headaches. Negative for dizziness, weakness and numbness.   All other systems reviewed and are negative.      Physical Exam   BP: 101/68  Heart Rate: 93  Temp: 98  F (36.7  C)  Resp: 16  SpO2: 100 %      Physical Exam   Constitutional: She is oriented to person, place, and time. She appears distressed.   HENT:   Nose: Mucosal edema present. Right sinus exhibits maxillary sinus tenderness and frontal sinus tenderness. Left sinus exhibits maxillary sinus tenderness and frontal sinus tenderness.   Mouth/Throat: Oropharynx is clear and moist.   Eyes: Conjunctivae and EOM are normal. Pupils are equal, round, and reactive to light.   Neck: Neck supple. No thyromegaly present.   Cardiovascular: Normal rate and regular rhythm.  Exam reveals no gallop and no friction rub.    No murmur heard.  Pulmonary/Chest: Effort normal. No respiratory distress. She has no wheezes. She has no rales.   Abdominal: Soft. Bowel sounds are normal. There is no tenderness. There is no rebound and no guarding.   Musculoskeletal: She exhibits no edema.   Lymphadenopathy:     She has no cervical adenopathy.   Neurological: She is alert and oriented to person, place, and time. She displays no tremor. No cranial nerve deficit. She exhibits normal muscle tone. She displays no seizure activity. Coordination normal. GCS eye subscore is 4. GCS verbal subscore is 5. GCS motor subscore is 6.   Skin: No rash noted. She is not diaphoretic.   Mouth:  A few canker-like sores in the oral mucosa and tongue. no vesicles.    Neurological:  Motor exam is 5/5   Sensory exam is fully intact  Finger-nose-finger: Normal  coordination     ED Course     ED Course     Procedures               Critical Care time:  none               Results for orders placed or performed during the hospital encounter of 06/30/18 (from the past 24 hour(s))   UA reflex to Microscopic   Result Value Ref Range    Color Urine Veda     Appearance Urine Slightly Cloudy     Glucose Urine Negative NEG^Negative mg/dL    Bilirubin Urine Small (A) NEG^Negative    Ketones Urine 20 (A) NEG^Negative mg/dL    Specific Gravity Urine 1.028 1.003 - 1.035    Blood Urine Small (A) NEG^Negative    pH Urine 5.0 5.0 - 7.0 pH    Protein Albumin Urine 100 (A) NEG^Negative mg/dL    Urobilinogen mg/dL 4.0 (H) 0.0 - 2.0 mg/dL    Nitrite Urine Negative NEG^Negative    Leukocyte Esterase Urine Small (A) NEG^Negative    Source Voided Urine     RBC Urine 7 (H) 0 - 2 /HPF    WBC Urine 22 (H) 0 - 5 /HPF    Bacteria Urine Few (A) NEG^Negative /HPF    Squamous Epithelial /HPF Urine 5 (H) 0 - 1 /HPF    Mucous Urine Present (A) NEG^Negative /LPF    Hyaline Casts 37 (H) 0 - 2 /LPF   Urine Culture   Result Value Ref Range    Specimen Description Midstream Urine     Special Requests Specimen received in preservative     Culture Micro PENDING    CBC with platelets differential   Result Value Ref Range    WBC 12.9 (H) 4.0 - 11.0 10e9/L    RBC Count 3.76 (L) 3.8 - 5.2 10e12/L    Hemoglobin 11.9 11.7 - 15.7 g/dL    Hematocrit 36.2 35.0 - 47.0 %    MCV 96 78 - 100 fl    MCH 31.6 26.5 - 33.0 pg    MCHC 32.9 31.5 - 36.5 g/dL    RDW 12.5 10.0 - 15.0 %    Platelet Count 130 (L) 150 - 450 10e9/L    Diff Method Automated Method     % Neutrophils 80.5 %    % Lymphocytes 9.3 %    % Monocytes 9.5 %    % Eosinophils 0.0 %    % Basophils 0.2 %    % Immature Granulocytes 0.5 %    Nucleated RBCs 0 0 /100    Absolute Neutrophil 10.4 (H) 1.6 - 8.3 10e9/L    Absolute Lymphocytes 1.2 0.8 - 5.3 10e9/L    Absolute Monocytes 1.2 0.0 - 1.3 10e9/L    Absolute Eosinophils 0.0 0.0 - 0.7 10e9/L    Absolute Basophils 0.0  0.0 - 0.2 10e9/L    Abs Immature Granulocytes 0.1 0 - 0.4 10e9/L    Absolute Nucleated RBC 0.0    Basic metabolic panel   Result Value Ref Range    Sodium 136 133 - 144 mmol/L    Potassium 3.2 (L) 3.4 - 5.3 mmol/L    Chloride 107 94 - 109 mmol/L    Carbon Dioxide 21 20 - 32 mmol/L    Anion Gap 8 3 - 14 mmol/L    Glucose 155 (H) 70 - 99 mg/dL    Urea Nitrogen 6 (L) 7 - 30 mg/dL    Creatinine 0.58 0.52 - 1.04 mg/dL    GFR Estimate >90 >60 mL/min/1.7m2    GFR Estimate If Black >90 >60 mL/min/1.7m2    Calcium 8.0 (L) 8.5 - 10.1 mg/dL   Erythrocyte sedimentation rate auto   Result Value Ref Range    Sed Rate 34 (H) 0 - 20 mm/h   CRP inflammation   Result Value Ref Range    CRP Inflammation 43.6 (H) 0.0 - 8.0 mg/L   Glucose CSF: Tube 2   Result Value Ref Range    Glucose CSF 73 (H) 40 - 70 mg/dL   Protein total CSF: Tube 2   Result Value Ref Range    Protein Total CSF 10 (L) 15 - 60 mg/dL   Gram stain   Result Value Ref Range    Specimen Description Cerebrospinal fluid     Special Requests tube 3     Gram Stain No organisms seen     Gram Stain No WBC's seen     Gram Stain       Gram stain review consistent with reported results.  Gram stain slide reviewed at the [facility]     CSF Culture Aerobic Bacterial   Result Value Ref Range    Specimen Description Cerebrospinal fluid     Special Requests tube 3     Culture Micro PENDING    Cell count with differential CSF: Tube 4   Result Value Ref Range    WBC CSF 1 0 - 5 /uL    RBC CSF 0 0 - 2 /uL    Tube Number 4 #    Volume 1 mL    Color CSF Colorless CLRL^Colorless    Appearance CSF Clear CLER^Clear   Rapid Strep Screen   Result Value Ref Range    Specimen Description Throat     Rapid Strep A Screen       NEGATIVE: No Group A streptococcal antigen detected by immunoassay, await culture report.       Medications   0.9% sodium chloride BOLUS (1,000 mLs Intravenous New Bag 6/30/18 1200)     Followed by   sodium chloride 0.9% infusion (not  administered)   0.9% sodium chloride BOLUS (not administered)   sodium chloride 0.9% infusion (not administered)   ketorolac (TORADOL) injection 15 mg (not administered)   hydrOXYzine (ATARAX) tablet 50 mg (not administered)     12:04 PM Patient assessed.    Assessments & Plan (with Medical Decision Making)     MDM: Lima Renteria is a 42 year old female who presented  from [facility] for alcohol abuse and a history of liver disease, migraine headache with febrile illness for the last 48 hours associated with developing neck stiffness headache myalgias and arthralgias primarily of the knees and hips, history of prior canker sores now currently several oral lesions relatively consistent with this, sinus congestion primarily of the maxillary and frontal sinuses for 3 days.  She noted her headache today was different than prior migraines and was most localized over the left eye and paranasal sinus regions.  She was treated symptomatically with Toradol, Tylenol, Compazine and fluids and did have improvement.  She was also given Mahamed-Synephrine, nasal saline.    Her fever is concerning associated with the neck stiffness and headache and underwent lumbar puncture with no white cells seen.  She has a normal neurologic exam and appears to be nontoxic.  I see no findings suggestive of herpes simplex virus despite the oral lesions which do appear more like canker sores.   I see no vesicles around the lips or other skin lesions. Coxsackievirus or herpangina and can cause Similar lesions.  She noted one small sore per vagina but no significant lesions to suggest Bechets or other autoimmune type condition affecting mucosa.     labs included parvovirus, tickborne illness including Anaplasma and Babesia.  Urinalysis demonstrated elevated white cells and leukocyte esterase and this was sent for culture.    Despite her current sinus symptoms these have only been ongoing for approximately 3 days it would be unlikely to be bacterial at  this point.  I did not image her sinuses.  She has no extraocular movement changes, no cranial nerve deficits.  head imaging of sinuses and orbits may be reasonable if persistent headache, fever and localized headache - for sinuses, cavernous sinus thrombosis (addended AM of 7/1/2018).    I spoke with Dr Amy Chavira, on for [facility], and we discussed the management and evaluation.  Will treat empirically with doxycycline until tickborne labs are returned, and may cover sinusitis albeit less ideally.  Will await urine culture.  Close interval follow-up as described below.  Precautions are given for return.  I do not see herpetic lesions to suggest the use of Valtrex or other antiviral.  Will also provide symptomatic management with nasal saline and Mahamed-Synephrine.  Additional management as below.       I have reviewed the nursing notes.    I have reviewed the findings, diagnosis, plan and need for follow up with the patient.       New Prescriptions    No medications on file       Final diagnoses:   Febrile illness - unclear cause.  maintain hydration. ibuprofen, tylenol for fever.  due to neck stiffness, fever. headache - lumbar puncture was done and was reassuring. consider tick borne illness and tests are pending for this. take doxycycline 100 mg twice daily for 14 days.  consider repeat klabs - crp and cbc monday.  check results for pending labs at that time.   Nonintractable episodic headache, unspecified headache type   Acute maxillary sinusitis, recurrence not specified - This is short-term and is likely viral kunal. with onset <4 days. Use nasal saline frequently and neosynephrine every 6 hours as needed for up to 3 days.   Oral aphthae - unclear if these are related to current illness.  avoid SLS toothpaste.   Pain in joint, multiple sites     This document serves as a record of the services and decisions personally performed and made by Alireza Perez MD. It was created on HIS/HER behalf by   Kathya  Jose, a trained medical scribe. The creation of this document is based the provider's statements to the medical scribe.  Kathya Garcia 12:41 PM 6/30/2018    Provider:   The information in this document, created by the medical scribe for me, accurately reflects the services I personally performed and the decisions made by me. I have reviewed and approved this document for accuracy prior to leaving the patient care area.  Alireza Perez MD 12:41 PM 6/30/2018 6/30/2018   [facility] EMERGENCY DEPARTMENT     Alireza Perez MD  07/01/18 0010       Alireza Perez MD  07/01/18 0638

## 2018-06-30 NOTE — ED NOTES
Patient states she is here from [facility]. She c/o fever to 103, headache and chills for the past couple days.

## 2018-06-30 NOTE — DISCHARGE INSTRUCTIONS
ICD-10-CM    1. Febrile illness R50.9     unclear cause.  maintain hydration. ibuprofen, tylenol for fever.  due to neck stiffness, fever. headache - lumbar puncture was done and was reassuring. consider tick borne illness and tests are pending for this. take doxycycline 100 mg twice daily for 14 days.  consider repeat klabs - crp and cbc monday.  check results for pending labs at that time.   2. Nonintractable episodic headache, unspecified headache type R51    3. Acute maxillary sinusitis, recurrence not specified J01.00     This is short-term and is likely viral kunal. with onset <4 days. Use nasal saline frequently and neosynephrine every 6 hours as needed for up to 3 days.   4. Oral aphthae K12.0     unclear if these are related to current illness.  avoid SLS toothpaste.         Febrile Illness with Uncertain Cause (Adult)  You have a fever, but the cause is unknown. A fever is a natural reaction of the body to an illness such as infection due to a virus or bacteria. Sometimes other conditions such as cancer or immune diseases can cause fever, especially if the fever has lasted for more than a week or 2. In most cases, the temperature itself is not harmful. It actually helps the body fight infections. A fever does not need to be treated unless you feel very uncomfortable.  Sometimes a fever can be an early sign of a more serious infection, so make sure to follow up if your condition worsens.  Home care  Unless given other instructions by your healthcare provider, follow these guidelines when caring for yourself at home.  General care    If your symptoms are not severe, rest at home for the first 2 to 3 days. When you resume activity, don't let yourself get too tired.    For your overall health, don't smoke. Also avoid being exposed to secondhand smoke.    Your appetite may be poor, so a light diet is fine. Avoid dehydration by drinking 6 to 8 glasses of fluids per day (such as water, soft drinks, sports drinks,  juices, tea, or soup). If you have congestion, extra fluids will help loosen secretions in the nose and lungs.  Medicines    You can take acetaminophen or ibuprofen for pain or to lower your temperature, unless you were given a different medicine to use. (Note: If you have chronic liver or kidney disease or have ever had a stomach ulcer or gastrointestinal bleeding, talk with your healthcare provider before using these medicines. Also talk to your provider if you are taking medicine to prevent blood clots.) Aspirin should never be given to anyone younger than 18 years of age who is ill with a viral infection or fever. It may cause severe liver or brain damage.    If you were given antibiotics for an infection, take them until they are used up, or your healthcare provider tells you to stop. It is important to finish the antibiotics even though you feel better. This is to make sure the infection has cleared. Be aware that antibiotics are not usually given for a viral infection or a fever with an unknown cause.    Over-the-counter medicines will not shorten the duration of the illness. However, they may be helpful for the following symptoms: cough, sore throat, or nasal and sinus congestion. Ask your pharmacist for product suggestions. (Note: Don't use decongestants if you have high blood pressure.)  Follow-up care  Follow up with your healthcare provider, or as advised.    If a culture or other lab tests were done, you will be notified if your treatment needs to be changed. You can call as directed for the results.    If X-rays, a CT, or an ultrasound were done, a specialist will review them. You will be notified of any findings that may affect your care.  Call 911  Call 911 if any of these occur:    Trouble breathing or swallowing, or wheezing    Chest pain    Confusion    Extreme drowsiness or trouble awakening    Fainting or loss of consciousness    Rapid heart rate    Low blood pressure    Vomiting blood, or large  amounts of blood in stool    Seizure  When to seek medical advice  Call your healthcare provider right away if any of these occur:    Cough with lots of colored sputum (mucus) or blood in your sputum    Severe headache    Face, neck, throat, or ear pain    Feeling drowsy    Abdominal pain    Repeated vomiting or diarrhea    Joint pain or a new rash    Burning when urinating    Fever of 100.4 F (38 C) or higher, or as directed by your healthcare provider    Feeling weak or dizzy  Date Last Reviewed: 11/1/2017 2000-2017 [employer]. [address] [place] [address]. All rights reserved. This information is not intended as a substitute for professional medical care. Always follow your healthcare professional's instructions.        Understanding Canker Sores  Canker sores are small, painful sores inside the mouth. They occur most often on the tongue, gums, or insides of the cheeks. The medical term for canker sores is aphthous ulcers.  What causes a canker sore?  The exact cause of canker sores is not known, but they are linked to a number of conditions. These include:    An injury or irritation in the mouth, such as biting the inside of your cheek or braces rubbing    Allergy or sensitivity to certain foods or substances, such as citrus juice or some kinds of toothpaste    Poor nutrition    Emotional stress    Certain infections and illnesses  Canker sores tend to run in families.  What are the symptoms of a canker sore?  These are some common traits of canker sores:    Sores are open and grayish-yellow, surrounded by redness.    Sores are usually painful and sensitive to touch.    Canker sores may be preceded by a burning or tingling sensation a few hours to a few days before the sore appears.    Children and teens tend to get canker sores more often than adults.  How are canker sores treated?  Canker sores usually go away by themselves within 10 to 14 days. There is no cure for canker sores.  Treatment focuses on relieving symptoms and shortening outbreaks. Treatments may include:    Prescription or over-the-counter skin treatments to apply to the sores. Steroids for your skin (topical) may protect the canker sores from further irritation and allow them to heal. Topical pain relief medicines may numb the area and make the sores less painful.    Certain types of toothpaste. These do not contain sodium lauryl sulfate. This type of toothpaste may prevent further aggravation of canker sores.    Oral prescription medicines. These are used for severe cases to help relieve symptoms.    Prescription or over-the-counter pain medicines. These help with discomfort.  What are the complications of a canker sore?  Mouth sores that seem to be canker sores can be signs of a more serious illness. If you have other signs of illness along with mouth sores, you should talk with a healthcare provider. Canker sores can be so painful that they interfere with talking, eating, or drinking.  When should I call my healthcare provider?  Call your healthcare provider right away if you have any of these:    Canker sores that don t go away after 2 weeks    Canker sores that come back more than 3 times a year    Canker sores that are larger than about a half-inch across    Fever of 100.4 F (38 C) or higher, or as directed    Pain that gets worse    You aren t able to eat or drink because of painful sores    Symptoms that don t get better, or symptoms that get worse    New symptoms   Date Last Reviewed: 5/1/2016 2000-2017 [employer]. [address] [place] [address], [place] [address]. All rights reserved. This information is not intended as a substitute for professional medical care. Always follow your healthcare professional's instructions.          Self-Care for Headaches  Most headaches aren't serious and can be relieved with self-care. But some headaches may be a sign of another health problem like eye trouble or high blood  "pressure. To find the best treatment, learn what kind of headaches you get. For tension headaches, self-care will usually help. To treat migraines, ask your healthcare provider for advice. It is also possible to get both tension and migraine headaches. Self-care involves relieving the pain and avoiding headache  triggers  if you can.    Ways to reduce pain and tension  Try these steps:    Apply a cold compress or ice pack to the pain site.    Drink fluids. If nausea makes it hard to drink, try sucking on ice.    Rest. Protect yourself from bright light and loud noises.    Calm your emotions by imagining a peaceful scene.    Massage tight neck, shoulder, and head muscles.    To relax muscles, soak in a hot bath or use a hot shower.  Use medicines  Aspirin or aspirin substitutes, such as ibuprofen and acetaminophen, can relieve headache. Remember: Never give aspirin to anyone 18 years old or younger because of the risk of developing Reye syndrome. Use pain medicines only when necessary.  Track your headaches  Keeping a headache diary can help you and your healthcare provider identify what's causing your headaches:    Note when each headache happens.    Identify your activities and the foods you've eaten 6 to 8 hours before the headache began.    Look for any trends or \"triggers.\"  Signs of tension headache  Any of the following can be signs:    Dull pain or feeling of pressure in a tight band around your head    Pain in your neck or shoulders    Headache without a definite beginning or end    Headache after an activity such as driving or working on a computer  Signs of migraine  Any of the following can be signs:    Throbbing pain on one or both sides of your head    Nausea or vomiting    Extreme sensitivity to light, sound, and smells    Bright spots, flashes, or other visual changes    Pain or nausea so severe that you can't continue your daily activities  Call your healthcare provider   If you have any of the " "following symptoms, contact your healthcare provider:    A headache that lingers after a recent injury or bump to the head.    A fever with a stiff neck or pain when you bend your head toward your chest.    A headache along with slurred speech, changes in your vision, or numbness or weakness in your arms or legs.    A headache for longer than 3 days.    Frequent headaches, especially in the morning.    Headaches with seizures     Seek immediate medical attention if you have a headache that you would call \"the worst headache you have ever had.\"   Date Last Reviewed: 10/4/2015    5747-1312 [email or web address]. [address] [place] [address]. All rights reserved. This information is not intended as a substitute for professional medical care. Always follow your healthcare professional's instructions.          "

## 2018-06-30 NOTE — ED NOTES
[facility] just called to obtain our fax number. I reminded then that our Dr is waiting for their Dr for a phone consultation.

## 2018-06-30 NOTE — ED NOTES
Called [facility] and gave report to lead RN Gayathri.They will send transportation. Patient has an antibx to  also.

## 2018-06-30 NOTE — ED AVS SNAPSHOT
[facility] Emergency Department    [address] [facility] [address]    Phone:  [phone]    Fax:  [phone]                                       Lima Renteria   MRN: [id number]    Department:  [facility] Emergency Department   Date of Visit:  6/30/2018           After Visit Summary Signature Page     I have received my discharge instructions, and my questions have been answered. I have discussed any challenges I see with this plan with the nurse or doctor.    ..........................................................................................................................................  Patient/Patient Representative Signature      ..........................................................................................................................................  Patient Representative Print Name and Relationship to Patient    ..................................................               ................................................  Date                                            Time    ..........................................................................................................................................  Reviewed by Signature/Title    ...................................................              ..............................................  Date                                                            Time

## 2018-07-01 VITALS
DIASTOLIC BLOOD PRESSURE: 62 MMHG | RESPIRATION RATE: 18 BRPM | SYSTOLIC BLOOD PRESSURE: 105 MMHG | OXYGEN SATURATION: 95 % | TEMPERATURE: 98.2 F | WEIGHT: 130 LBS

## 2018-07-01 LAB
B BURGDOR IGG+IGM SER QL: 0.02 (ref 0–0.89)
BACTERIA SPEC CULT: NORMAL
Lab: NORMAL
SPECIMEN SOURCE: NORMAL

## 2018-07-01 PROCEDURE — 25000128 H RX IP 250 OP 636: Performed by: EMERGENCY MEDICINE

## 2018-07-01 PROCEDURE — 96375 TX/PRO/DX INJ NEW DRUG ADDON: CPT | Performed by: EMERGENCY MEDICINE

## 2018-07-01 PROCEDURE — 96374 THER/PROPH/DIAG INJ IV PUSH: CPT | Performed by: EMERGENCY MEDICINE

## 2018-07-01 RX ORDER — METOCLOPRAMIDE HYDROCHLORIDE 5 MG/ML
10 INJECTION INTRAMUSCULAR; INTRAVENOUS ONCE
Status: COMPLETED | OUTPATIENT
Start: 2018-07-01 | End: 2018-07-01

## 2018-07-01 RX ORDER — FENTANYL CITRATE 50 UG/ML
100 INJECTION, SOLUTION INTRAMUSCULAR; INTRAVENOUS ONCE
Status: COMPLETED | OUTPATIENT
Start: 2018-07-01 | End: 2018-07-01

## 2018-07-01 RX ADMIN — FENTANYL CITRATE 100 MCG: 50 INJECTION INTRAMUSCULAR; INTRAVENOUS at 01:01

## 2018-07-01 RX ADMIN — METOCLOPRAMIDE 10 MG: 5 INJECTION, SOLUTION INTRAMUSCULAR; INTRAVENOUS at 01:01

## 2018-07-01 ASSESSMENT — PAIN DESCRIPTION - DESCRIPTORS
DESCRIPTORS: SHARP
DESCRIPTORS: DULL

## 2018-07-01 NOTE — ED PROVIDER NOTES
History     Chief Complaint   Patient presents with     Headache     Fever     HPI  Lima Renteria is a 42 year old female who presents for fever and headache.  The patient history is obtained from the patient and review of her chart.  She has had several days of fever and headache.  Currently being treated for alcohol abuse.  She was seen here earlier today and had a very large workup that was largely reassuring and she was discharged on doxycycline for presumptive treatment of tickborne illness.  She returns today due to continued fevers and headache.  Headache is mild to moderate in severity.  She has taken acetaminophen and ibuprofen with improvement in her pain.  She denies any photophobia.  No runny nose, cough, sore throat, chest pain, abdominal pain, vomiting, diarrhea, dysuria, or rash.  She does feel achy in her joints.    Problem List:    Patient Active Problem List    Diagnosis Date Noted     Anxiety 06/30/2018     Priority: Medium     Migraine 06/30/2018     Priority: Medium     Alcohol use disorder, severe, dependence (H) 03/14/2018     Priority: Medium     LAMAR (generalized anxiety disorder) 03/14/2018     Priority: Medium     Acute liver failure without hepatic coma 03/13/2018     Priority: Medium     Alcohol abuse 03/13/2018     Priority: Medium     Low grade squamous intraepithelial lesion (LGSIL) on cervical Pap smear 08/01/2017     Priority: Medium     Overview:   8/2017; colp advised       Arthralgia of multiple joints 05/05/2016     Priority: Medium     Chronic midline thoracic back pain 05/05/2016     Priority: Medium     Thyroid nodule 10/06/2014     Priority: Medium     Overview:   2 nodules noted on US on 10/1/14. Need follow (FNA vs repeat US).        Pain medication agreement 04/10/2014     Priority: Medium     Overview:   Percocet 5-325 mg  - 90 tablets to last for 30 days as of 6/20/2014.  Follows with ortho and also rheumatology as well.        Asthma 03/13/2014     Priority: Medium         Past Medical History:    No past medical history on file.    Past Surgical History:    No past surgical history on file.    Family History:    No family history on file.    Social History:  Marital Status:   [2]  Social History   Substance Use Topics     Smoking status: Not on file     Smokeless tobacco: Not on file     Alcohol use Not on file        Medications:      acamprosate (CAMPRAL) 333 MG EC tablet   doxycycline (VIBRAMYCIN) 100 MG capsule   ESCITALOPRAM OXALATE PO   gabapentin (NEURONTIN) 300 MG capsule   hydrOXYzine (VISTARIL) 25 MG capsule   IBUPROFEN PO   propranolol (INDERAL) 10 MG tablet   THIAMINE HCL PO         Review of Systems  Pertinent positives and negatives listed in the HPI, all other systems reviewed and are negative.    Physical Exam   BP: 107/65  Heart Rate: 90  Temp: 100.6  F (38.1  C)  Resp: 18  Weight: 59 kg (130 lb)  SpO2: 96 %      Physical Exam   Constitutional: She is oriented to person, place, and time. She appears well-developed and well-nourished. She appears distressed.   HENT:   Head: Normocephalic and atraumatic.   Right Ear: External ear normal.   Left Ear: External ear normal.   Nose: Nose normal.   Eyes: Conjunctivae are normal. No scleral icterus.   Neck: Normal range of motion.   Cardiovascular: Normal rate and regular rhythm.    Pulmonary/Chest: Effort normal. No stridor. No respiratory distress. She has no wheezes.   Abdominal: Soft. She exhibits no distension. There is no tenderness.   Neurological: She is alert and oriented to person, place, and time. No cranial nerve deficit.   Skin: Skin is warm and dry. No rash noted. She is not diaphoretic. No erythema.   Psychiatric: She has a normal mood and affect. Her behavior is normal.   Nursing note and vitals reviewed.      ED Course     ED Course     Procedures               Critical Care time:  none               Results for orders placed or performed during the hospital encounter of 06/30/18 (from the past 24  hour(s))   UA reflex to Microscopic   Result Value Ref Range    Color Urine Veda     Appearance Urine Slightly Cloudy     Glucose Urine Negative NEG^Negative mg/dL    Bilirubin Urine Small (A) NEG^Negative    Ketones Urine 20 (A) NEG^Negative mg/dL    Specific Gravity Urine 1.028 1.003 - 1.035    Blood Urine Small (A) NEG^Negative    pH Urine 5.0 5.0 - 7.0 pH    Protein Albumin Urine 100 (A) NEG^Negative mg/dL    Urobilinogen mg/dL 4.0 (H) 0.0 - 2.0 mg/dL    Nitrite Urine Negative NEG^Negative    Leukocyte Esterase Urine Small (A) NEG^Negative    Source Voided Urine     RBC Urine 7 (H) 0 - 2 /HPF    WBC Urine 22 (H) 0 - 5 /HPF    Bacteria Urine Few (A) NEG^Negative /HPF    Squamous Epithelial /HPF Urine 5 (H) 0 - 1 /HPF    Mucous Urine Present (A) NEG^Negative /LPF    Hyaline Casts 37 (H) 0 - 2 /LPF   Urine Culture   Result Value Ref Range    Specimen Description Midstream Urine     Special Requests Specimen received in preservative     Culture Micro PENDING    CBC with platelets differential   Result Value Ref Range    WBC 12.9 (H) 4.0 - 11.0 10e9/L    RBC Count 3.76 (L) 3.8 - 5.2 10e12/L    Hemoglobin 11.9 11.7 - 15.7 g/dL    Hematocrit 36.2 35.0 - 47.0 %    MCV 96 78 - 100 fl    MCH 31.6 26.5 - 33.0 pg    MCHC 32.9 31.5 - 36.5 g/dL    RDW 12.5 10.0 - 15.0 %    Platelet Count 130 (L) 150 - 450 10e9/L    Diff Method Automated Method     % Neutrophils 80.5 %    % Lymphocytes 9.3 %    % Monocytes 9.5 %    % Eosinophils 0.0 %    % Basophils 0.2 %    % Immature Granulocytes 0.5 %    Nucleated RBCs 0 0 /100    Absolute Neutrophil 10.4 (H) 1.6 - 8.3 10e9/L    Absolute Lymphocytes 1.2 0.8 - 5.3 10e9/L    Absolute Monocytes 1.2 0.0 - 1.3 10e9/L    Absolute Eosinophils 0.0 0.0 - 0.7 10e9/L    Absolute Basophils 0.0 0.0 - 0.2 10e9/L    Abs Immature Granulocytes 0.1 0 - 0.4 10e9/L    Absolute Nucleated RBC 0.0    Basic metabolic panel   Result Value Ref Range    Sodium 136 133 - 144 mmol/L    Potassium 3.2 (L) 3.4 - 5.3  mmol/L    Chloride 107 94 - 109 mmol/L    Carbon Dioxide 21 20 - 32 mmol/L    Anion Gap 8 3 - 14 mmol/L    Glucose 155 (H) 70 - 99 mg/dL    Urea Nitrogen 6 (L) 7 - 30 mg/dL    Creatinine 0.58 0.52 - 1.04 mg/dL    GFR Estimate >90 >60 mL/min/1.7m2    GFR Estimate If Black >90 >60 mL/min/1.7m2    Calcium 8.0 (L) 8.5 - 10.1 mg/dL   Erythrocyte sedimentation rate auto   Result Value Ref Range    Sed Rate 34 (H) 0 - 20 mm/h   CRP inflammation   Result Value Ref Range    CRP Inflammation 43.6 (H) 0.0 - 8.0 mg/L   Glucose CSF: Tube 2   Result Value Ref Range    Glucose CSF 73 (H) 40 - 70 mg/dL   Protein total CSF: Tube 2   Result Value Ref Range    Protein Total CSF 10 (L) 15 - 60 mg/dL   Gram stain   Result Value Ref Range    Specimen Description Cerebrospinal fluid     Special Requests tube 3     Gram Stain No organisms seen     Gram Stain No WBC's seen     Gram Stain       Gram stain review consistent with reported results.  Gram stain slide reviewed at the [facility]     CSF Culture Aerobic Bacterial   Result Value Ref Range    Specimen Description Cerebrospinal fluid     Special Requests tube 3     Culture Micro PENDING    Cell count with differential CSF: Tube 4   Result Value Ref Range    WBC CSF 1 0 - 5 /uL    RBC CSF 0 0 - 2 /uL    Tube Number 4 #    Volume 1 mL    Color CSF Colorless CLRL^Colorless    Appearance CSF Clear CLER^Clear   Rapid Strep Screen   Result Value Ref Range    Specimen Description Throat     Rapid Strep A Screen       NEGATIVE: No Group A streptococcal antigen detected by immunoassay, await culture report.       Medications   metoclopramide (REGLAN) injection 10 mg (10 mg Intravenous Given 7/1/18 0101)   fentaNYL (PF) (SUBLIMAZE) injection 100 mcg (100 mcg Intravenous Given 7/1/18 0101)       Assessments & Plan (with Medical Decision Making)   42-year-old female presents with fever and headache.  Temperature is 30.1 C, blood pressure 107/65, heart rate  90, SPO2 is 96% on room air.  Given the previous workup from earlier today, no indication for repeat of the extensive testing she has already had performed.  Urinalysis from earlier was normal without signs of infection.  Her lungs are clear to auscultation here, no signs of pneumonia.  Lumbar puncture from earlier today had 1 white blood cells, no sign of CSF infection.  Throat swab was negative for group A streptococcal pharyngitis.  At this time I will give her fentanyl and metoclopramide to help with treatment of her symptoms.  On recheck she is feeling much better, feels comfortable going home, and is discharged with instructions to return if worse, otherwise follow-up in clinic.  The patient is in agreement with this plan.    I have reviewed the nursing notes.    I have reviewed the findings, diagnosis, plan and need for follow up with the patient.       Discharge Medication List as of 7/1/2018  2:45 AM          Final diagnoses:   Fever and chills   Acute nonintractable headache, unspecified headache type       6/30/2018   [facility] EMERGENCY DEPARTMENT     Chemo Mann MD  07/01/18 0731

## 2018-07-01 NOTE — ED NOTES
PT is a 42 year old female who has been brought to the ED by EMS for continued headache and fever. PT placed in room 10, on the gurney and on the monitor. PT states she was seen in this ED earlier today for same and has not been getting better. States she took motrin at 2130 and tylenol at 2230. PT states she is having left side head ache behind eye and that the pain is sharp and pressure in nature. States the pain also radiates to the top of her head. Denies N/V at this time. PT is coming from [facility]. PT is noted to be A&OX4, appears to be in mild pain. All needs are being assessed and will be met and all comfort measures are being addressed. Awaiting MD bashir and orders at this time.

## 2018-07-01 NOTE — DISCHARGE INSTRUCTIONS
Return to the emergency department if you have worsening symptoms, repeated vomiting, lightheadedness, or other concerns.  Otherwise follow-up for recheck in 1-2 days.

## 2018-07-01 NOTE — ED NOTES
Bed: ED10  Expected date:   Expected time:   Means of arrival:   Comments:  [facility]/ fever Ambulance

## 2018-07-01 NOTE — ED NOTES
Called and spoke to staff at [facility] and update given. Staff states they will have to speak with their MD and they will call us back about if they can receive PT back.

## 2018-07-01 NOTE — ED NOTES
[facility] transport here.   All D/C home info given and all questions answered. PT verbalized understanding.

## 2018-07-01 NOTE — ED NOTES
PT is resting on gurney in position of comfort. All needs are being met and all comfort measures are being addressed. Awaiting MD dispo at this time. I will continue to assess and moitor PT.

## 2018-07-02 LAB
BACTERIA SPEC CULT: NORMAL
PARASITE SPEC INSPECT: NORMAL
SPECIMEN SOURCE: NORMAL
SPECIMEN SOURCE: NORMAL

## 2018-07-03 LAB
APPEARANCE CSF: CLEAR
COLOR CSF: COLORLESS
RBC # CSF MANUAL: 0 /UL (ref 0–2)
SPECIMEN VOL CSF: 1 ML
TUBE # CSF: 4 #
WBC # CSF MANUAL: 1 /UL (ref 0–5)

## 2018-07-04 LAB
A PHAGOCYTOPH DNA BLD QL NAA+PROBE: NOT DETECTED
B MICROTI DNA SPEC QL NAA+PROBE: NOT DETECTED
B19V DNA SER QL NAA+PROBE: NOT DETECTED
BABESIA DNA SPEC QL NAA+PROBE: NOT DETECTED
E CHAFFEENSIS DNA BLD QL NAA+PROBE: NOT DETECTED
E EWINGII DNA SPEC QL NAA+PROBE: NOT DETECTED
EHRLICHIA DNA SPEC QL NAA+PROBE: NOT DETECTED
SPECIMEN SOURCE: NORMAL

## 2018-07-05 LAB
BACTERIA SPEC CULT: NO GROWTH
Lab: NORMAL
SPECIMEN SOURCE: NORMAL

## 2019-07-17 ENCOUNTER — HOSPITAL ENCOUNTER (INPATIENT)
Facility: CLINIC | Age: 44
LOS: 20 days | Discharge: PSYCHIATRIC OR SUBSTANCE USE FACILITY | DRG: 885 | End: 2019-08-06
Attending: PSYCHIATRY & NEUROLOGY | Admitting: PSYCHIATRY & NEUROLOGY
Payer: COMMERCIAL

## 2019-07-17 DIAGNOSIS — R45.1 AGITATION: ICD-10-CM

## 2019-07-17 DIAGNOSIS — F10.20 ALCOHOL USE DISORDER, SEVERE, DEPENDENCE (H): ICD-10-CM

## 2019-07-17 DIAGNOSIS — F41.9 ANXIETY: ICD-10-CM

## 2019-07-17 DIAGNOSIS — F33.1 MODERATE EPISODE OF RECURRENT MAJOR DEPRESSIVE DISORDER (H): ICD-10-CM

## 2019-07-17 DIAGNOSIS — K21.00 GASTROESOPHAGEAL REFLUX DISEASE WITH ESOPHAGITIS: Primary | ICD-10-CM

## 2019-07-17 DIAGNOSIS — F51.01 PRIMARY INSOMNIA: ICD-10-CM

## 2019-07-17 PROBLEM — F32.A DEPRESSION: Status: ACTIVE | Noted: 2019-07-17

## 2019-07-17 PROCEDURE — 12400001 ZZH R&B MH UMMC

## 2019-07-17 PROCEDURE — 25000132 ZZH RX MED GY IP 250 OP 250 PS 637: Performed by: PSYCHIATRY & NEUROLOGY

## 2019-07-17 PROCEDURE — HZ2ZZZZ DETOXIFICATION SERVICES FOR SUBSTANCE ABUSE TREATMENT: ICD-10-PCS | Performed by: PSYCHIATRY & NEUROLOGY

## 2019-07-17 RX ORDER — MULTIPLE VITAMINS W/ MINERALS TAB 9MG-400MCG
1 TAB ORAL DAILY
Status: DISCONTINUED | OUTPATIENT
Start: 2019-07-18 | End: 2019-08-06 | Stop reason: HOSPADM

## 2019-07-17 RX ORDER — BISACODYL 10 MG
10 SUPPOSITORY, RECTAL RECTAL DAILY PRN
Status: DISCONTINUED | OUTPATIENT
Start: 2019-07-17 | End: 2019-08-06 | Stop reason: HOSPADM

## 2019-07-17 RX ORDER — DIAZEPAM 2 MG
1 TABLET ORAL 2 TIMES DAILY
Status: ON HOLD | COMMUNITY
End: 2019-08-06

## 2019-07-17 RX ORDER — LORAZEPAM 1 MG/1
1-4 TABLET ORAL EVERY 30 MIN PRN
Status: DISCONTINUED | OUTPATIENT
Start: 2019-07-17 | End: 2019-07-20

## 2019-07-17 RX ORDER — OXYCODONE HYDROCHLORIDE 5 MG/1
5 TABLET ORAL EVERY 6 HOURS PRN
Status: DISCONTINUED | OUTPATIENT
Start: 2019-07-17 | End: 2019-07-19

## 2019-07-17 RX ORDER — LANOLIN ALCOHOL/MO/W.PET/CERES
100 CREAM (GRAM) TOPICAL DAILY
Status: DISCONTINUED | OUTPATIENT
Start: 2019-07-18 | End: 2019-08-06 | Stop reason: HOSPADM

## 2019-07-17 RX ORDER — ATENOLOL 50 MG/1
50 TABLET ORAL DAILY PRN
Status: DISCONTINUED | OUTPATIENT
Start: 2019-07-17 | End: 2019-08-06 | Stop reason: HOSPADM

## 2019-07-17 RX ORDER — FOLIC ACID 1 MG/1
1 TABLET ORAL DAILY
Status: DISCONTINUED | OUTPATIENT
Start: 2019-07-18 | End: 2019-08-06 | Stop reason: HOSPADM

## 2019-07-17 RX ORDER — NALOXONE HYDROCHLORIDE 0.4 MG/ML
.1-.4 INJECTION, SOLUTION INTRAMUSCULAR; INTRAVENOUS; SUBCUTANEOUS
Status: DISCONTINUED | OUTPATIENT
Start: 2019-07-17 | End: 2019-08-06 | Stop reason: HOSPADM

## 2019-07-17 RX ORDER — ACETAMINOPHEN 325 MG/1
650 TABLET ORAL EVERY 4 HOURS PRN
Status: DISCONTINUED | OUTPATIENT
Start: 2019-07-17 | End: 2019-07-17

## 2019-07-17 RX ORDER — ALUMINA, MAGNESIA, AND SIMETHICONE 2400; 2400; 240 MG/30ML; MG/30ML; MG/30ML
30 SUSPENSION ORAL EVERY 4 HOURS PRN
Status: DISCONTINUED | OUTPATIENT
Start: 2019-07-17 | End: 2019-08-06 | Stop reason: HOSPADM

## 2019-07-17 RX ORDER — GABAPENTIN 300 MG/1
300 CAPSULE ORAL 3 TIMES DAILY
Status: DISCONTINUED | OUTPATIENT
Start: 2019-07-17 | End: 2019-07-18

## 2019-07-17 RX ORDER — FOLIC ACID 1 MG/1
1 TABLET ORAL DAILY
Status: ON HOLD | COMMUNITY
End: 2019-07-18

## 2019-07-17 RX ORDER — ONDANSETRON 4 MG/1
4 TABLET, ORALLY DISINTEGRATING ORAL EVERY 6 HOURS PRN
Status: ON HOLD | COMMUNITY
End: 2019-07-17

## 2019-07-17 RX ORDER — TRAZODONE HYDROCHLORIDE 50 MG/1
50 TABLET, FILM COATED ORAL
Status: DISCONTINUED | OUTPATIENT
Start: 2019-07-17 | End: 2019-08-06 | Stop reason: HOSPADM

## 2019-07-17 RX ORDER — HYDROXYZINE HYDROCHLORIDE 25 MG/1
25 TABLET, FILM COATED ORAL EVERY 8 HOURS PRN
Status: DISCONTINUED | OUTPATIENT
Start: 2019-07-17 | End: 2019-07-21

## 2019-07-17 RX ORDER — GABAPENTIN 800 MG/1
800 TABLET ORAL 3 TIMES DAILY
Status: ON HOLD | COMMUNITY
End: 2019-08-06

## 2019-07-17 RX ORDER — PROPRANOLOL HYDROCHLORIDE 10 MG/1
10 TABLET ORAL 3 TIMES DAILY
Status: DISCONTINUED | OUTPATIENT
Start: 2019-07-17 | End: 2019-07-18

## 2019-07-17 RX ORDER — PROPRANOLOL HYDROCHLORIDE 10 MG/1
10 TABLET ORAL 2 TIMES DAILY
Status: ON HOLD | COMMUNITY
End: 2019-08-06

## 2019-07-17 RX ORDER — LORAZEPAM 0.5 MG/1
0.5 TABLET ORAL 2 TIMES DAILY
Status: DISCONTINUED | OUTPATIENT
Start: 2019-07-17 | End: 2019-07-18

## 2019-07-17 RX ORDER — HYDROXYZINE HYDROCHLORIDE 25 MG/1
25-50 TABLET, FILM COATED ORAL 3 TIMES DAILY PRN
Status: ON HOLD | COMMUNITY
End: 2019-08-06

## 2019-07-17 RX ORDER — ESCITALOPRAM OXALATE 20 MG/1
20 TABLET ORAL DAILY
Status: ON HOLD | COMMUNITY
End: 2019-08-06

## 2019-07-17 RX ORDER — LANOLIN ALCOHOL/MO/W.PET/CERES
100 CREAM (GRAM) TOPICAL DAILY
Status: ON HOLD | COMMUNITY
End: 2019-07-18

## 2019-07-17 RX ORDER — FAMOTIDINE 20 MG/1
20 TABLET, FILM COATED ORAL 2 TIMES DAILY
Status: ON HOLD | COMMUNITY
End: 2019-08-06

## 2019-07-17 RX ORDER — ONDANSETRON 4 MG/1
TABLET, FILM COATED ORAL EVERY 6 HOURS PRN
Status: ON HOLD | COMMUNITY
End: 2019-08-06

## 2019-07-17 RX ORDER — OLANZAPINE 10 MG/2ML
10 INJECTION, POWDER, FOR SOLUTION INTRAMUSCULAR
Status: DISCONTINUED | OUTPATIENT
Start: 2019-07-17 | End: 2019-08-06 | Stop reason: HOSPADM

## 2019-07-17 RX ORDER — ESCITALOPRAM OXALATE 10 MG/1
10 TABLET ORAL DAILY
Status: DISCONTINUED | OUTPATIENT
Start: 2019-07-18 | End: 2019-07-18

## 2019-07-17 RX ORDER — OLANZAPINE 10 MG/1
10 TABLET ORAL
Status: DISCONTINUED | OUTPATIENT
Start: 2019-07-17 | End: 2019-08-06 | Stop reason: HOSPADM

## 2019-07-17 RX ORDER — IBUPROFEN 600 MG/1
600 TABLET, FILM COATED ORAL EVERY 6 HOURS PRN
Status: DISCONTINUED | OUTPATIENT
Start: 2019-07-17 | End: 2019-07-18

## 2019-07-17 RX ADMIN — OXYCODONE HYDROCHLORIDE 5 MG: 5 TABLET ORAL at 22:14

## 2019-07-17 RX ADMIN — OXYCODONE HYDROCHLORIDE 5 MG: 5 TABLET ORAL at 17:09

## 2019-07-17 RX ADMIN — GABAPENTIN 300 MG: 300 CAPSULE ORAL at 20:10

## 2019-07-17 RX ADMIN — LORAZEPAM 2 MG: 1 TABLET ORAL at 17:10

## 2019-07-17 RX ADMIN — LORAZEPAM 0.5 MG: 0.5 TABLET ORAL at 20:11

## 2019-07-17 RX ADMIN — PROPRANOLOL HYDROCHLORIDE 10 MG: 10 TABLET ORAL at 20:11

## 2019-07-17 ASSESSMENT — ACTIVITIES OF DAILY LIVING (ADL)
ORAL_HYGIENE: INDEPENDENT
TRANSFERRING: 0-->INDEPENDENT
SWALLOWING: 0-->SWALLOWS FOODS/LIQUIDS WITHOUT DIFFICULTY
FALL_HISTORY_WITHIN_LAST_SIX_MONTHS: NO
DRESS: 0-->INDEPENDENT
AMBULATION: 0-->INDEPENDENT
BATHING: 0-->INDEPENDENT
COGNITION: 0 - NO COGNITION ISSUES REPORTED
DRESS: INDEPENDENT
LAUNDRY: WITH SUPERVISION
RETIRED_EATING: 0-->INDEPENDENT
RETIRED_COMMUNICATION: 0-->UNDERSTANDS/COMMUNICATES WITHOUT DIFFICULTY
HYGIENE/GROOMING: INDEPENDENT
TOILETING: 0-->INDEPENDENT

## 2019-07-17 ASSESSMENT — MIFFLIN-ST. JEOR: SCORE: 1318.14

## 2019-07-17 NOTE — PROGRESS NOTES
07/17/19 1518   Patient Belongings   Did you bring any home meds/supplements to the hospital?  No   Patient Belongings remains with patient;sent to security per site process;locker   Belongings Search Yes   Clothing Search Yes   Second Staff Savanah Del Castillo sent to security (envelope #[phone]) : Vanilla Visa Debit - [phone], Vanilla Visa Debit - [phone], Vanilla Visa Debit - [phone], Vanilla Visa Debit - [phone], Eb Justen Visa - [phone], Redcard Mastercard - [phone]    Belongings with pt or in locker as appropriate: [place] drivers license, zippered pouch with metal tip gel pens, paper cup with markers, puzzle books, 2 pairs of pants, leggings, t-shirt, brush, watch, necklace, 7 bracelets, hair tie, medical  Paperwork in a folder, purse, loose change, cigarettes, gum, chapsticks, hydrocortisone cream, various pens and pencils,  port, lighter    A               Admission:  I am responsible for any personal items that are not sent to the safe or pharmacy.  [facility] is not responsible for loss, theft or damage of any property in my possession.    Signature:  _________________________________ Date: _______  Time: _____                                              Staff Signature:  ____________________________ Date: ________  Time: _____      2nd Staff person, if patient is unable/unwilling to sign:    Signature: ________________________________ Date: ________  Time: _____     Discharge:  [facility] has returned all of my personal belongings:    Signature: _________________________________ Date: ________  Time: _____                                          Staff Signature:  ____________________________ Date: ________  Time: _____

## 2019-07-18 LAB
ALBUMIN SERPL-MCNC: 3.1 G/DL (ref 3.4–5)
ALP SERPL-CCNC: 258 U/L (ref 40–150)
ALT SERPL W P-5'-P-CCNC: 226 U/L (ref 0–50)
ANION GAP SERPL CALCULATED.3IONS-SCNC: 10 MMOL/L (ref 3–14)
AST SERPL W P-5'-P-CCNC: 153 U/L (ref 0–45)
BILIRUB SERPL-MCNC: 0.6 MG/DL (ref 0.2–1.3)
BUN SERPL-MCNC: 7 MG/DL (ref 7–30)
CALCIUM SERPL-MCNC: 9 MG/DL (ref 8.5–10.1)
CHLORIDE SERPL-SCNC: 100 MMOL/L (ref 94–109)
CK SERPL-CCNC: 99 U/L (ref 30–225)
CO2 SERPL-SCNC: 27 MMOL/L (ref 20–32)
CREAT SERPL-MCNC: 0.63 MG/DL (ref 0.52–1.04)
ERYTHROCYTE [DISTWIDTH] IN BLOOD BY AUTOMATED COUNT: 15.1 % (ref 10–15)
GFR SERPL CREATININE-BSD FRML MDRD: >90 ML/MIN/{1.73_M2}
GLUCOSE SERPL-MCNC: 121 MG/DL (ref 70–99)
HCT VFR BLD AUTO: 37.5 % (ref 35–47)
HGB BLD-MCNC: 12.1 G/DL (ref 11.7–15.7)
INR PPP: 0.91 (ref 0.86–1.14)
LIPASE SERPL-CCNC: 183 U/L (ref 73–393)
MAGNESIUM SERPL-MCNC: 2.7 MG/DL (ref 1.6–2.3)
MCH RBC QN AUTO: 31.9 PG (ref 26.5–33)
MCHC RBC AUTO-ENTMCNC: 32.3 G/DL (ref 31.5–36.5)
MCV RBC AUTO: 99 FL (ref 78–100)
PHOSPHATE SERPL-MCNC: 6.1 MG/DL (ref 2.5–4.5)
PHOSPHATE SERPL-MCNC: 6.1 MG/DL (ref 2.5–4.5)
PLATELET # BLD AUTO: 116 10E9/L (ref 150–450)
POTASSIUM SERPL-SCNC: 4.4 MMOL/L (ref 3.4–5.3)
PROT SERPL-MCNC: 7.3 G/DL (ref 6.8–8.8)
RBC # BLD AUTO: 3.79 10E12/L (ref 3.8–5.2)
SODIUM SERPL-SCNC: 137 MMOL/L (ref 133–144)
WBC # BLD AUTO: 4.6 10E9/L (ref 4–11)

## 2019-07-18 PROCEDURE — 36415 COLL VENOUS BLD VENIPUNCTURE: CPT | Performed by: PHYSICIAN ASSISTANT

## 2019-07-18 PROCEDURE — 85027 COMPLETE CBC AUTOMATED: CPT | Performed by: PHYSICIAN ASSISTANT

## 2019-07-18 PROCEDURE — 80053 COMPREHEN METABOLIC PANEL: CPT | Performed by: PHYSICIAN ASSISTANT

## 2019-07-18 PROCEDURE — 99232 SBSQ HOSP IP/OBS MODERATE 35: CPT | Performed by: PHYSICIAN ASSISTANT

## 2019-07-18 PROCEDURE — 99207 ZZC CDG-HISTORY COMP: MEETS EXP. PROB FOCUSED- DOWN CODED LACK OF PFSH: CPT | Performed by: PHYSICIAN ASSISTANT

## 2019-07-18 PROCEDURE — 25000132 ZZH RX MED GY IP 250 OP 250 PS 637: Performed by: PHYSICIAN ASSISTANT

## 2019-07-18 PROCEDURE — 83735 ASSAY OF MAGNESIUM: CPT | Performed by: PHYSICIAN ASSISTANT

## 2019-07-18 PROCEDURE — 83690 ASSAY OF LIPASE: CPT | Performed by: PHYSICIAN ASSISTANT

## 2019-07-18 PROCEDURE — 84100 ASSAY OF PHOSPHORUS: CPT | Performed by: PHYSICIAN ASSISTANT

## 2019-07-18 PROCEDURE — 12400001 ZZH R&B MH UMMC

## 2019-07-18 PROCEDURE — 25000132 ZZH RX MED GY IP 250 OP 250 PS 637: Performed by: PSYCHIATRY & NEUROLOGY

## 2019-07-18 PROCEDURE — 85610 PROTHROMBIN TIME: CPT | Performed by: PHYSICIAN ASSISTANT

## 2019-07-18 PROCEDURE — 99223 1ST HOSP IP/OBS HIGH 75: CPT | Mod: AI | Performed by: PSYCHIATRY & NEUROLOGY

## 2019-07-18 PROCEDURE — 82550 ASSAY OF CK (CPK): CPT | Performed by: PHYSICIAN ASSISTANT

## 2019-07-18 RX ORDER — LORAZEPAM 0.5 MG/1
0.5 TABLET ORAL AT BEDTIME
Status: DISCONTINUED | OUTPATIENT
Start: 2019-07-18 | End: 2019-07-25

## 2019-07-18 RX ORDER — ACAMPROSATE CALCIUM 333 MG/1
666 TABLET, DELAYED RELEASE ORAL 3 TIMES DAILY
Status: ON HOLD | COMMUNITY
End: 2019-08-06

## 2019-07-18 RX ORDER — GABAPENTIN 400 MG/1
800 CAPSULE ORAL 3 TIMES DAILY
Status: DISCONTINUED | OUTPATIENT
Start: 2019-07-18 | End: 2019-07-24

## 2019-07-18 RX ORDER — ONDANSETRON 4 MG/1
4 TABLET, FILM COATED ORAL EVERY 6 HOURS PRN
Status: DISCONTINUED | OUTPATIENT
Start: 2019-07-18 | End: 2019-08-06 | Stop reason: HOSPADM

## 2019-07-18 RX ORDER — ACAMPROSATE CALCIUM 333 MG/1
666 TABLET, DELAYED RELEASE ORAL 3 TIMES DAILY
Status: DISCONTINUED | OUTPATIENT
Start: 2019-07-18 | End: 2019-08-06 | Stop reason: HOSPADM

## 2019-07-18 RX ORDER — OXYCODONE HYDROCHLORIDE 5 MG/1
5 TABLET ORAL EVERY 4 HOURS PRN
Status: ON HOLD | COMMUNITY
End: 2019-08-06

## 2019-07-18 RX ORDER — FAMOTIDINE 20 MG/1
20 TABLET, FILM COATED ORAL 2 TIMES DAILY
Status: DISCONTINUED | OUTPATIENT
Start: 2019-07-18 | End: 2019-08-06 | Stop reason: HOSPADM

## 2019-07-18 RX ORDER — ESCITALOPRAM OXALATE 20 MG/1
20 TABLET ORAL DAILY
Status: DISCONTINUED | OUTPATIENT
Start: 2019-07-19 | End: 2019-08-06 | Stop reason: HOSPADM

## 2019-07-18 RX ORDER — DIAZEPAM 10 MG
10 TABLET ORAL EVERY 30 MIN PRN
Status: ON HOLD | COMMUNITY
End: 2019-07-18

## 2019-07-18 RX ORDER — LIDOCAINE 4 G/G
1-2 PATCH TOPICAL
Status: DISCONTINUED | OUTPATIENT
Start: 2019-07-18 | End: 2019-07-24

## 2019-07-18 RX ORDER — PROPRANOLOL HYDROCHLORIDE 10 MG/1
10 TABLET ORAL 2 TIMES DAILY
Status: DISCONTINUED | OUTPATIENT
Start: 2019-07-18 | End: 2019-08-06 | Stop reason: HOSPADM

## 2019-07-18 RX ADMIN — GABAPENTIN 300 MG: 300 CAPSULE ORAL at 09:05

## 2019-07-18 RX ADMIN — ESCITALOPRAM OXALATE 10 MG: 10 TABLET ORAL at 09:05

## 2019-07-18 RX ADMIN — OXYCODONE HYDROCHLORIDE 5 MG: 5 TABLET ORAL at 20:03

## 2019-07-18 RX ADMIN — MULTIPLE VITAMINS W/ MINERALS TAB 1 TABLET: TAB at 09:05

## 2019-07-18 RX ADMIN — IBUPROFEN 600 MG: 600 TABLET ORAL at 09:05

## 2019-07-18 RX ADMIN — OXYCODONE HYDROCHLORIDE 5 MG: 5 TABLET ORAL at 06:55

## 2019-07-18 RX ADMIN — GABAPENTIN 300 MG: 300 CAPSULE ORAL at 13:11

## 2019-07-18 RX ADMIN — IBUPROFEN 600 MG: 600 TABLET ORAL at 02:27

## 2019-07-18 RX ADMIN — PROPRANOLOL HYDROCHLORIDE 10 MG: 10 TABLET ORAL at 13:16

## 2019-07-18 RX ADMIN — THIAMINE HCL TAB 100 MG 100 MG: 100 TAB at 09:05

## 2019-07-18 RX ADMIN — GABAPENTIN 800 MG: 400 CAPSULE ORAL at 20:03

## 2019-07-18 RX ADMIN — LORAZEPAM 0.5 MG: 0.5 TABLET ORAL at 09:05

## 2019-07-18 RX ADMIN — PROPRANOLOL HYDROCHLORIDE 10 MG: 10 TABLET ORAL at 09:05

## 2019-07-18 RX ADMIN — OXYCODONE HYDROCHLORIDE 5 MG: 5 TABLET ORAL at 13:11

## 2019-07-18 RX ADMIN — LIDOCAINE 2 PATCH: 560 PATCH PERCUTANEOUS; TOPICAL; TRANSDERMAL at 13:17

## 2019-07-18 RX ADMIN — PROPRANOLOL HYDROCHLORIDE 10 MG: 10 TABLET ORAL at 20:03

## 2019-07-18 RX ADMIN — FOLIC ACID 1 MG: 1 TABLET ORAL at 09:05

## 2019-07-18 RX ADMIN — FAMOTIDINE 20 MG: 20 TABLET, FILM COATED ORAL at 20:03

## 2019-07-18 RX ADMIN — LORAZEPAM 0.5 MG: 0.5 TABLET ORAL at 20:03

## 2019-07-18 ASSESSMENT — ACTIVITIES OF DAILY LIVING (ADL)
DRESS: INDEPENDENT
HYGIENE/GROOMING: INDEPENDENT
ORAL_HYGIENE: INDEPENDENT

## 2019-07-18 NOTE — PLAN OF CARE
Pt was isolative most of the time this shift in her room. Came out of her room few times for medications and other requests. Presents with blunted flat affect. Denies SI/SIB/AH/VH. Pt reported feeling anxious and also complaint of pain on abdomen. Prn medication was given and was effective. Will continue to monitor for withdrawal symptoms.

## 2019-07-18 NOTE — PROGRESS NOTES
07/18/19 1418   Behavioral Health   Hallucinations denies / not responding to hallucinations   Thinking poor concentration;distractable   Orientation person: oriented;place: oriented;date: oriented   Memory baseline memory   Insight poor   Judgement impaired   Eye Contact at examiner   Affect irritable;tense   Mood depressed;hopeless   Physical Appearance/Attire attire appropriate to age and situation   Hygiene well groomed   Suicidality other (see comments)  (denies)   1. Wish to be Dead No   2. Non-Specific Active Suicidal Thoughts  No   Self Injury other (see comment)  (denies)   Elopement   (nothing to report)   Activity isolative;withdrawn   Speech clear;coherent   Medication Sensitivity no observed side effects   Psychomotor / Gait balanced;steady   Psycho Education   Type of Intervention 1:1 intervention   Response participates, initiates socially appropriate   Hours 0.5   Treatment Detail   (check in)   Activities of Daily Living   Hygiene/Grooming independent   Oral Hygiene independent   Dress independent   Room Organization independent   The patient was in her room most of the morning part of the shift and was not social with peers when out of her room.  The patient did not attend groups this shift.  The patient is reporting she is having sever pain due to complications from alcohol use and she is hyper focused on her pain.  The patient was not able to engage in talking about her emotions or current situation, only fixated on talking about pain medication.  The had not spoke to her doctor at the time of our check in and she was not able to focus on anything beyond her desire to talk to him and get higher levels of pain medications.  The patient denies SI and SiB.  The patient did eat both meals this shift.  The patient had poor insight in to her mental health and current situation.

## 2019-07-18 NOTE — CONSULTS
Internal Medicine Initial Visit      Lima Renteria MRN# [id number]   YOB: 1975 Age: 43 year old   Date of Admission: 7/17/2019  PCP: [facility]    Referring Provider: Behavioral Health - Noe Bahena MD  Reason for Visit: severe abdominal pain.               Assessment and Recommendations:   Lima Renteria is a 43 year old woman with a long standing history of alcohol dependence, depression with suicidal ideation who was transferred to station 20 who is admitted to station 20 with suicidal ideation. Internal Medicine consultation was ordered by Dr. Noe Bahena for evaluation of severe abdominal pain.        History of alcohol abuse  History of alcoholic hepatitis with evidence of cirrhosis  Abdominal pain, likely secondary to alcoholic hepatitis:  Hospitalized at [facility] from 7/12-7/17 for suicidal ideation, alcohol withdrawal, and abdominal pain. Underwent extensive work-up and had Gastroenterology consult. Work-up included CT abd/pelvis with slight dilatation of the right intrarenal collecting system and ureter to the level of the bladder without obstructing stone or mass identified. No urinary calculi identified. There was Hepatic cirrhosis, steatosis and recanalization of the diminutive umbilical vein. Cholecystectomy and appendectomy. Also included US abdomen with duplex which course hepatic parenchymal echogenicity without focal lesion in the patient w/ underlying cirrhosis. No ascites. Normal duplex. Hepatitis A, B, C serologies negative. Normal AFP. Did not meet criteria for steroids. UA with hematuria, however, UCx negative for growth. On review of GI note from 7/17/19, patient was noted to have continued pain in RUQ that wrapped around to her back. Thought pain likely secondary to alcoholic hepatitis. She was on oxycodone 5mg q6hrs. Per review of note, patient was demanding of IV pain medication to nursing staff and repeatedly reported no one was  helping her.  GI resumed IV Dilaudid yesterday for increasing pain prior to transfer. When I walked into room, patient was standing up in no distress. However, after further discussion, she became frustrated, stating she is not getting adequate pain medications. She had just had oxycodone 5mg 20min PTA. Currently, patient reports continued pain in the RUQ that radiates to right side consistent with the pain she had at [facility]. Vitals are stable without fever. Last BM normal this AM. No dysuria. Tolerating diet. CBC obtained today without leukocytosis. Procal 7/17/19 negative. LFTs today w/o acute change AlkP 258, ,  (AlkP 235,  and  yesterday at [facility]). Lipase . Likely continued pain from alcoholic hepatitis which should improve with time and abstinence of alcohol.   -Continue Oxycodone 5mg q6hrs prn as already ordered --> could consider ordering every 4 hours prn  -Discontinued Ibuprofen given hx of cirrhosis and thrombocytopenia; avoid NSAIDs   -Lidoderm patch and bengay cream prn  -Continue Pepcid BID   -Continue Zofran prn  -Encourage heat   -Continue MVI, Folic acid and Thiamine  -Monitor Intake and Output   -Patient will need follow-up with Gastroenterology as outpatient for follow-up and EGD  -Trend CBC, CMP in AM      Hematuria: Noted to have hematuria on UA from 7/12 and 7/14. UCx negative for growth. CT scan abd/pelvis w/ contrast without urinary calculi identified. There was slight dilatation in the right intrarenal collecting system and ureter to the level of the bladder w/o obstructing stone or mass.  On a second sample had large squamous epithelium cells which UA could be contaminant. Hematuria was of unknown origin. However, per patient she had a previous history of nephrostomy tube and had a stone in the past. Pt discharged from [facility] w/ instructions to follow-up with Urology as OP. No complaints of dysuria, frequency, urgency, or gross hematuria.  -Will repeat  UA/UCx  -CK level   ADDENDUM: CK WNL.     Thrombocytopenia: Likely secondary to alcohol abuse with cirrhosis. Plts on admission 116, improved from 60 yesterday. No signs or symptoms of GIB or acute bleeding.      Hyperphosphatemia  History of hypokalemia and hypomagnesemia, resolved: Phos level WNL on 7/12. However, noted to be 4.8 on 7/16. Repeat today 6.1. No myalgias, normal Calcium and Renal function. No significant hyperglycemia or AG. Was on replacement protocol at OSH for K/Mg/Phos, however, unclear if she would have received Phosphorus supplementation. Given normal Phos at start of recent admission, likely secondary to replacement.   -Repeat Phos level now and in AM  -Check CK level   ADDENDUM CK WNL. Phos at 6.1. Will repeat BMP/Phos in AM. Called RN and patient is drinking adequate fluids. If not tolerating PO, will consider IV normal saline. Likely phos will improve with renal excretion.     Medicine will continue to follow, please page with any additional concerns.     Yue Romo PA-C  Hospitalist Service   Pager: [phone]     Reason for Admission:   Suicidal ideation          History of Present Illness:   History is obtained from the patient and medical record.     Lima Renteria is a 43 year old woman with a long standing history of alcohol dependence, depression with suicidal ideation who was transferred to station 20 who is admitted to station 20 with suicidal ideation. Internal Medicine consultation was ordered by Dr. Noe Bahena for evaluation of severe abdominal pain.     Patient is a 43 year old female with history of depression, anxiety, alcohol abuse who was admitted to [facility] on 7/12-7/17 for alcohol withdrawal, suicidal ideation, hypokalemia, and alcoholic hepatitis. Please see discharge summary from care everywhere for complete details. She as evaluated by Gastroenterology during her admission. During her hospitalization, she had a CT scan abd/pelvis and US  "abdomen with duplex showing e/o cirrhosis without ascites. AFP WNL. Hepatitis studies negative. UA with hematuria on 7/12 and 7/14, however urine culture with no growth and CT scan without evidence of calculi or mass. She did not have evidence of hepatic encephalopathy.     On review of GI note from 7/17/19, patient was noted to have continued pain in RUQ that wrapped around to her back. Thought pain likely secondary to alcoholic hepatitis. She was on oxycodone 5mg q6hrs. Per review of note, patient was demanding of IV pain medication to nursing staff and repeatedly reported no one was helping her.  GI resumed IV Dilaudid yesterday for increasing pain prior to transfer.     Currently, upon arrival to room, patient was standing near her bed  no distress. However, after further discussion patient became increasingly agitated and frustrated and tearful. Patient reports continued similar pain in the RUQ that radiates to right back. She denies any nausea or vomiting but is concerned this will return if her pain isn't managed. She is frustrated that she is not getting more pain medications. She states, \"no one cares about my pain.\" She states given her history of substance dependence no one takes her seriously. She states her last BM was this AM. No melena or hematochezia. No dysuria, frequency, urgency or gross hematuria. She denies any fevers or chills. The last dose of oxycodone was given 20 minutes ago and she states it isn't helping. She states the pain is what is was at [facility]. She is tolerating a diet. No myalgias. No chest pain, SOB, or cough. No other acute concerns.           Review of Systems:    ROS: 10 point ROS neg other than the symptoms noted above in the HPI.            Past Medical History:   Reviewed and updated in Epic.  No past medical history on file.          Past Surgical History:   Reviewed and updated in Epic.  No past surgical history on file.          Social History:     Social " History     Tobacco Use     Smoking status: Not on file   Substance Use Topics     Alcohol use: Not on file     Drug use: Not on file             Family History:   Reviewed and updated in Epic.  No family history on file.          Allergies:     Allergies   Allergen Reactions     Cranberry Extract Hives     Morphine Other (See Comments)     Vomiting     Penicillins Unknown     Sertraline Other (See Comments)     dreams     Unasyn      Celecoxib Rash     Sulfa Drugs Rash             Medications:     Medications Prior to Admission   Medication Sig Dispense Refill Last Dose     acamprosate (CAMPRAL) 333 MG EC tablet Take 666 mg by mouth 3 times daily   Past Month     diazepam (VALIUM) 2 MG tablet Take 1 mg by mouth 2 times daily   7/17/2019     escitalopram (LEXAPRO) 20 MG tablet Take 20 mg by mouth daily   7/17/2019     famotidine (PEPCID) 20 MG tablet Take 20 mg by mouth 2 times daily   7/17/2019     gabapentin (NEURONTIN) 800 MG tablet Take 800 mg by mouth 3 times daily   7/17/2019     hydrOXYzine (ATARAX) 25 MG tablet Take 25-50 mg by mouth 3 times daily as needed for itching   7/17/2019     ondansetron (ZOFRAN) 4 MG tablet Take by mouth every 6 hours as needed for nausea   7/17/2019     oxyCODONE (ROXICODONE) 5 MG tablet Take 5 mg by mouth every 4 hours as needed for severe pain    7/17/2019     propranolol (INDERAL) 10 MG tablet Take 10 mg by mouth 2 times daily   7/17/2019     IBUPROFEN PO Take 600 mg by mouth every 6 hours as needed for moderate pain   Unknown        Current Facility-Administered Medications   Medication     alum & mag hydroxide-simethicone (MYLANTA ES/MAALOX  ES) suspension 30 mL     atenolol (TENORMIN) tablet 50 mg     bisacodyl (DULCOLAX) Suppository 10 mg     escitalopram (LEXAPRO) tablet 10 mg     folic acid (FOLVITE) tablet 1 mg     gabapentin (NEURONTIN) capsule 300 mg     hydrOXYzine (VISTARIL) capsule 25 mg     ibuprofen (ADVIL/MOTRIN) tablet 600 mg     LORazepam (ATIVAN) tablet 0.5 mg  "    LORazepam (ATIVAN) tablet 1-4 mg     magnesium hydroxide (MILK OF MAGNESIA) suspension 30 mL     multivitamin w/minerals (THERA-VIT-M) tablet 1 tablet     naloxone (NARCAN) injection 0.1-0.4 mg     OLANZapine (zyPREXA) tablet 10 mg    Or     OLANZapine (zyPREXA) injection 10 mg     oxyCODONE (ROXICODONE) tablet 5 mg     propranolol (INDERAL) tablet 10 mg     traZODone (DESYREL) tablet 50 mg     vitamin B1 (THIAMINE) tablet 100 mg            Physical Exam:   Blood pressure 105/75, pulse 88, temperature 98.6  F (37  C), temperature source Oral, resp. rate 16, height 1.575 m (5' 2\"), weight 70.5 kg (155 lb 6.4 oz), SpO2 95 %.  Body mass index is 28.42 kg/m .  GENERAL: Alert and oriented x 3. Initially in no acute distress, became tearful. Non-toxic appearing.   HEENT: Anicteric sclera. Mucous membranes moist.   CV: RRR. S1, S2. No murmurs appreciated.   RESPIRATORY: Effort normal on room air. Lungs CTAB with no wheezing, rales, rhonchi.   GI: Abdomen soft, non distended, non tender. Normal bowel sounds. Moderate tenderness in the RUQ. No CVA tenderness. No guarding, rigidity or rebound tenderness.  MUSCULOSKELETAL: No joint swelling or tenderness.  NEUROLOGICAL: No focal deficits. Moves all extremities.   EXTREMITIES: No peripheral edema. Intact bilateral pedal pulses.   SKIN: No jaundice. No rashes.           Data:     ROUTINE IP LABS (Last four results)  Recent Labs   Lab 07/18/19  1323      POTASSIUM 4.4   CHLORIDE 100   CO2 27   ANIONGAP 10   *   BUN 7   CR 0.63   ALEXANDRO 9.0   MAG 2.7*   PHOS 6.1*   PROTTOTAL 7.3   ALBUMIN 3.1*   BILITOTAL 0.6   ALKPHOS 258*   *   *     Recent Labs   Lab 07/18/19  1323   WBC 4.6   RBC 3.79*   HGB 12.1   HCT 37.5   MCV 99   MCH 31.9   MCHC 32.3   RDW 15.1*   *     Recent Labs   Lab 07/18/19  1323   INR 0.91        Glucose Values Latest Ref Rng & Units 7/18/2019   Bedside Glucose (mg/dl )  - --   GLUCOSE 70 - 99 mg/dL 121(H)   Some recent data " might be hidden        All labs personally reviewed in [facility].  See A&P for additional results.     Unresulted Labs Ordered in the Past 30 Days of this Admission     No orders found for last 31 day(s).               TSH:  No results found for: TSH    Tox screen: negative per OSH records     Unresulted Labs Ordered in the Past 30 Days of this Admission     No orders found for last 31 day(s).

## 2019-07-18 NOTE — H&P
"Admitted:     07/17/2019      PSYCHIATRIC EVALUATION      The patient was seen on 07/18 for 70 minutes.  Greater than 50% of this time was spent on counseling and coordinating care, clarifying diagnostic and prognostic issues and presence of support in community.  Discussed with patient alcohol dependence issues and her plans after discharge.  I also spent significant time talking about her abdominal pain.      HISTORY OF PRESENT ADMISSION:  The patient is a 43-year-old  female who was transferred to this Unit 20 on 72-hour hold from [facility].  The patient was admitted there for alcohol detoxification.  Since discharge, the patient was admitted there for alcohol detoxification.      HISTORY OF PRESENT ILLNESS:  The patient does not present as a reliable historian; in fact, most of the information came from collateral sources from computer records.  The patient did complain significantly about abdominal pain and was talking about how pain medication she was provided here (Roxicodone) was not sufficient for her pain.  She did look pretty depressed and very uncomfortable.  She denied presence of suicidal thoughts but told me that she attempted suicide a few months ago.  She firmly said that she would not go to a chemical dependency treatment program, \"I know about chemical dependency treatment, they can teach it to other people.\"  She reports difficulties with sleep, hopeless, helpless, poor concentration, low energy and high anxiety.      PAST PSYCHIATRIC HISTORY:  The patient reported that in the past she tried multiple psychotropic medications including Xanax, Inderal, Effexor, Prozac, Paxil, Celexa and, most recently, Lexapro and acamprosate.  She has also been treated with propranolol for anxiety.  She reports that she was in a room in a treatment program at [facility] that was in the last year and that she was able to stay sober, but then relapsed and went back to drinking.  In 04/2019, patient " was hospitalized at [facility] after she texted her  she that she going to the shed, take the dog's lease and hang herself.  The patient reported no manic episodes in the past, did describe some mood swings, most likely related to poor coping skills and life skills, ongoing alcohol use.  She denied ever having psychotic symptoms.  In addition to acamprosate, she also used to be on naltrexone.      MEDICATIONS ON ADMISSION:     1.  Lexapro 20 mg daily.   2.  Gabapentin 800 mg 3 times a day.   3.  Vistaril 25-50 mg 2 times a day p.r.n. for anxiety.   4.  Propranolol 10 mg 2 times a day.   5.  Acamprosate 666 mg 3 times a day.   6.  Pepcid 20 mg 2 times a day.      ALLERGIES:  SHE REPORTED BE ALLERGIC TO CRANBERRY EXTRACT, MORPHINE, PENICILLIN, SERTRALINE, UNASYN, CELECOXIB AND SULFA.        PAST MEDICAL HISTORY:  Significant for kidney disease.  She states she has a history of liver cirrhosis, chronic migraines.  The patient reports history of delirium tremens and being told that she has liver cirrhosis.      FAMILY AND SOCIAL HISTORY:  She said that she grew up in [place].  High school graduate and got an Associate degree and went on to work as a  for over 16 years.  She has not been working for about 1-1/2-2 years.  She has 4 children, 3 daughters and 1 son.  Describes her relationship with her  as estranged due to mental health and alcohol use.  All this information came from computer records.  Patient denied any past or present legal problems.  She said that parents got  when she was 14 years old.  She has 1 older sister, a younger brother and does have a younger stepsister and older stepbrother.  She said that everybody in the family are alcoholics, including mother, father, brothers and sisters as well as both sets of grandparents.  She denies any sexual or physical abuse while growing up.      PHYSICAL EXAMINATION/REVIEW OF SYSTEMS:  For physical examination  and 12-point review of systems, please refer to Dr. Israel's note 07/17/2019.  I reviewed this note and agree with it.        VITAL SIGNS:  Temperature 98.6, respirations 16, heart rate 82, blood pressure 92/67.      MENTAL STATUS EXAMINATION:  The patient is a 43-year-old white female dressed in hospital garbs.  She is clearly uncomfortable, was sitting on a chair and moaning quite loudly, complaining about abdominal pain.  She also appeared to be pretty irritable.  Speech was regular in rate and rhythm.  She reported feeling depressed, but denied suicidal or homicidal ideation.  Affect was restricted, congruent with mood.  Denied any symptoms of psychosis, hypomania or bharat.  Fund of knowledge appeared to be average with proper usage of vocabulary.  There was no loosening of associations or flight of ideas.  Thought process is logical and goal directed.  Insight and judgment are limited.  She is alert and oriented x3.  Cognition and memory functions are impaired.  Station, gait and coordination within normal limits.  Attention and concentration are decreased.  Language is intact with no articulation problem.      IMPRESSION:   1.  Major depressive disorder, recurrent, moderate severity.   2.  Generalized anxiety disorder.   3.  History of panic disorder with agoraphobia.   4.  Alcohol use disorder, severe, with dependence.     5.  Recent alcohol withdrawal symptoms.     6.  Liver cirrhosis.      TREATMENT PLAN:  The patient will be restarted on her home medication.  We will treat her pain with Roxicodone.  She was treated at [facility] with Valium.  I felt it appropriate, given the fact of her impaired liver, I will continue patient on lorazepam with a plan to discontinue it eventually.  I will ask for Internal Medicine input to help deal with the patient's medical problems.  She already indicated that she would not like to go into a chemical dependency treatment program and will be continued for now  on a 72-hour until withdrawal symptoms are gone and then likely will discharge home against medical advice.         BRIANA ARMANDO MD             D: 2019   T: 2019   MT: ETHAN      Name:     RUSLAN MENJIVAR   MRN:      [id number]        Account:      [id number]   :      1975        Admitted:     2019                   Document: [id number]       cc: [facility]

## 2019-07-18 NOTE — PHARMACY-ADMISSION MEDICATION HISTORY
Admission medication history for the July 18, 2019 admission is complete.     Interview Sources:  Patient, Care Everywhere - [facility] Discharge Summary    Reliability of Source: Patient is a decent historian.    Medication Adherence: Good    Current Outpatient Pharmacy: [facility] [address]    Changes made to PTA medication list (reason)  Added:   Oxycodone 5 mg tab per [facility] discharge summary  Acamprosate 333 mg ec tab per pt report  Deleted:   Folic acid 1 mg tab - 1 daily - per pt report not taking  Melatonin 5 mg tab - 1 at bedtime prn insomnia - per pt report ineffective and not taking  Thiamine 100 mg tab - 1 daily - per pt report not taking   Changed:   n/a    Additional medication history information:   Pt reports that she has been taking the listed doses of escitalopram, gabapentin, and propranolol for years. She does not know why she is on the famotidine and complains that it is not helping her cirrhosis pain. She occasionally takes ibuprofen for her arthritis but says it upsets her stomach.  Pt was in pain throughout the interview and had to quickly excuse herself at the end because of it. She is frustrated that her pain is not being managed as well as it was at [facility] where she reports that she received IV hydromorphone and PO Percocet.  She reports that she was taking her acamprosate up until she went to [facility]. On her discharge summary, the instructions are to discontinue acamprosate.    Prior to Admission Medication List:  Prior to Admission medications    Medication Sig Last Dose Taking? Auth Provider   acamprosate (CAMPRAL) 333 MG EC tablet Take 666 mg by mouth 3 times daily Past Month Yes Unknown, Entered By History   diazepam (VALIUM) 2 MG tablet Take 1 mg by mouth 2 times daily 7/17/2019 Yes Unknown, Entered By History   escitalopram (LEXAPRO) 20 MG tablet Take 20 mg by mouth daily 7/17/2019 Yes Unknown, Entered By History   famotidine (PEPCID) 20 MG tablet Take 20 mg by mouth 2  times daily 7/17/2019 Yes Unknown, Entered By History   gabapentin (NEURONTIN) 800 MG tablet Take 800 mg by mouth 3 times daily 7/17/2019 Yes Unknown, Entered By History   hydrOXYzine (ATARAX) 25 MG tablet Take 25-50 mg by mouth 3 times daily as needed for itching 7/17/2019 Yes Unknown, Entered By History   ondansetron (ZOFRAN) 4 MG tablet Take by mouth every 6 hours as needed for nausea 7/17/2019 Yes Unknown, Entered By History   oxyCODONE (ROXICODONE) 5 MG tablet Take 5 mg by mouth every 4 hours as needed for severe pain  7/17/2019 Yes Unknown, Entered By History   propranolol (INDERAL) 10 MG tablet Take 10 mg by mouth 2 times daily 7/17/2019 Yes Unknown, Entered By History   IBUPROFEN PO Take 600 mg by mouth every 6 hours as needed for moderate pain Unknown  Reported, Patient     Time spent: 30 minutes    Medication history completed by:   Emilee Christie PharmD IV Student

## 2019-07-18 NOTE — PLAN OF CARE
BEHAVIORAL TEAM DISCUSSION    Participants: Arabella Doherty (CTC), Hong (RN)  Progress: Continuing to assess.   Continued Stay Criteria/Rationale: Assessment, evaluation, and recommendations.   Medical/Physical: Liver Cirrhosis.   Precautions:   Behavioral Orders   Procedures     Code 1 - Restrict to Unit     Elopement precautions     Routine Programming     As clinically indicated     Status 15     Every 15 minutes.     Suicide precautions     Patients on Suicide Precautions should have a Combination Diet ordered that includes a Diet selection(s) AND a Behavioral Tray selection for Safe Tray - with utensils, or Safe Tray - NO utensils       Withdrawal precautions     Plan: The plan is to assess the patient for mental health and medication needs. The patient will be prescribed medications to treat the identified symptoms. Patient will participate in therapeutic skill building groups on the unit. [facility] to coordinate discharge/after care planning.     Rationale for change in precautions or plan: None.

## 2019-07-18 NOTE — PROGRESS NOTES
"Initial Psychosocial Assessment  I have reviewed the chart, met with the patient, and developed Care Plan. Information for assessment was obtained from the patient, the patient's medical chart, and the patient's DEC assessment.     During this assessment the patient was minimally cooperative. She laid in the bed with her eyes closed and would only answer the questions with one word. Most of this information was gathered from a chart review.      Presenting Problem: The patient was transferred from [facility] on a 72 hour hold for suicidal ideation. She was admitted on the medical unit for alcohol withdrawal.     \"Pt also has a history of DTs during withdrawal.   Pt has a history of  suicide attempt in March when she tried to hang herself.  According to report, pt just completed treatment in a rehab for alcohol abuse but resumed drinking immediately upon discharge.  During the admission process pt reported that she has agoraphobia, liver cirrhosis and that she is in so much pain and would like to see the doctor.  Pt stated \"I was on Dilaudid  when I was in [facility]. And am also on medication for my alcohol withdrawal.\"  Pt was calm and co-operative with the search protocol and denies SI.  Brief tour of the unit given.\" - Savanah Garcia RN (07/17/19)    History of Mental Health and Chemical Dependency: The patient has a history of mental health hospitalizations and Chemical Dependency issues. The patient was last hospitalized at [facility] for SI in April 2019. The patient has a past suicide attempt by hanging in March. According to the patient's previous H&P from that hospitalization, the patient has an extensive alcohol use history and has been to treatment several times. The patient was reportedly just at treatment at [facility] but once she left went back to drinking. She has gone through [facility]'s outpatient and inpatient treatment program.     Significant Life Events  (Illness, Abuse, Trauma, Death): Unable to assess.  " "     Family/Living Situation: The patient reported that she is currently living at home with her  and four children (ages 18, 15, 12, 9). The patient reported that there is a familial history of mental illness citing that her sister has Bipolar Disorder and her father has Aspergers.      Educational Background: Unable to assess.      Financial Status: The patient is currently unemployed. Unable to assess for any other possible income sources.      Legal Issues: The patient is currently on a 72 Hour Hold -   Hold Start:  Hold End:   The patient denies any other legal issues.       Ethnic/Cultural Issues: None.      Spiritual Orientation: None.       Service History: None.      Social Functioning (organization, interests): The patient stated \"They take them all away from me.\"      Current Treatment Providers are:  Primary Care: Maria Del Rosario Moe in [facility].   Psychiatry: Vadim Tai with [facility]   Therapist: Eli Yoder  : None.   Other Providers:     Social Service Assessment/Plan: CTC will explore options for follow up care and  provide a psychological assessment.Patient will be provided with a safe environment, medication management, as well as offered groups for coping skills.       "

## 2019-07-18 NOTE — PLAN OF CARE
PAIN:At 1530, pt was up to the med window asking for this RN to give her more pain meds. She stated she requested dilaudid at [facility] and received it. Pt was quite agitated over this and this RN did speak to medical (Yue Post) and she stated the roxicodone 5 mg every 6 hours was enough. Will also discuss drinking more fluids with this pt. She was tearful and irritable but was able to except that she will only  get the medications prescribed.  MSSA 7 at 1600.

## 2019-07-19 LAB
ALBUMIN SERPL-MCNC: 3.3 G/DL (ref 3.4–5)
ALP SERPL-CCNC: 250 U/L (ref 40–150)
ALT SERPL W P-5'-P-CCNC: 225 U/L (ref 0–50)
ANION GAP SERPL CALCULATED.3IONS-SCNC: 8 MMOL/L (ref 3–14)
AST SERPL W P-5'-P-CCNC: 125 U/L (ref 0–45)
BILIRUB SERPL-MCNC: 0.5 MG/DL (ref 0.2–1.3)
BUN SERPL-MCNC: 7 MG/DL (ref 7–30)
CALCIUM SERPL-MCNC: 8.9 MG/DL (ref 8.5–10.1)
CHLORIDE SERPL-SCNC: 102 MMOL/L (ref 94–109)
CO2 SERPL-SCNC: 26 MMOL/L (ref 20–32)
CREAT SERPL-MCNC: 0.65 MG/DL (ref 0.52–1.04)
ERYTHROCYTE [DISTWIDTH] IN BLOOD BY AUTOMATED COUNT: 15.1 % (ref 10–15)
GFR SERPL CREATININE-BSD FRML MDRD: >90 ML/MIN/{1.73_M2}
GLUCOSE SERPL-MCNC: 151 MG/DL (ref 70–99)
HCT VFR BLD AUTO: 39.8 % (ref 35–47)
HGB BLD-MCNC: 12.9 G/DL (ref 11.7–15.7)
INR PPP: 0.91 (ref 0.86–1.14)
MAGNESIUM SERPL-MCNC: 2.6 MG/DL (ref 1.6–2.3)
MCH RBC QN AUTO: 32.2 PG (ref 26.5–33)
MCHC RBC AUTO-ENTMCNC: 32.4 G/DL (ref 31.5–36.5)
MCV RBC AUTO: 99 FL (ref 78–100)
PHOSPHATE SERPL-MCNC: 3.7 MG/DL (ref 2.5–4.5)
PLATELET # BLD AUTO: 174 10E9/L (ref 150–450)
POTASSIUM SERPL-SCNC: 4.6 MMOL/L (ref 3.4–5.3)
PROT SERPL-MCNC: 7.9 G/DL (ref 6.8–8.8)
RBC # BLD AUTO: 4.01 10E12/L (ref 3.8–5.2)
SODIUM SERPL-SCNC: 136 MMOL/L (ref 133–144)
WBC # BLD AUTO: 5.1 10E9/L (ref 4–11)

## 2019-07-19 PROCEDURE — 99233 SBSQ HOSP IP/OBS HIGH 50: CPT | Performed by: PSYCHIATRY & NEUROLOGY

## 2019-07-19 PROCEDURE — 99231 SBSQ HOSP IP/OBS SF/LOW 25: CPT | Performed by: PHYSICIAN ASSISTANT

## 2019-07-19 PROCEDURE — 85610 PROTHROMBIN TIME: CPT | Performed by: PHYSICIAN ASSISTANT

## 2019-07-19 PROCEDURE — 80053 COMPREHEN METABOLIC PANEL: CPT | Performed by: PHYSICIAN ASSISTANT

## 2019-07-19 PROCEDURE — 12400001 ZZH R&B MH UMMC

## 2019-07-19 PROCEDURE — 85027 COMPLETE CBC AUTOMATED: CPT | Performed by: PHYSICIAN ASSISTANT

## 2019-07-19 PROCEDURE — 84100 ASSAY OF PHOSPHORUS: CPT | Performed by: PHYSICIAN ASSISTANT

## 2019-07-19 PROCEDURE — 83735 ASSAY OF MAGNESIUM: CPT | Performed by: PHYSICIAN ASSISTANT

## 2019-07-19 PROCEDURE — 36415 COLL VENOUS BLD VENIPUNCTURE: CPT | Performed by: PHYSICIAN ASSISTANT

## 2019-07-19 PROCEDURE — 25000132 ZZH RX MED GY IP 250 OP 250 PS 637: Performed by: PHYSICIAN ASSISTANT

## 2019-07-19 PROCEDURE — 25000132 ZZH RX MED GY IP 250 OP 250 PS 637: Performed by: NURSE PRACTITIONER

## 2019-07-19 PROCEDURE — 25000132 ZZH RX MED GY IP 250 OP 250 PS 637: Performed by: PSYCHIATRY & NEUROLOGY

## 2019-07-19 RX ORDER — OLANZAPINE 5 MG/1
5-10 TABLET, ORALLY DISINTEGRATING ORAL ONCE
Status: COMPLETED | OUTPATIENT
Start: 2019-07-19 | End: 2019-07-19

## 2019-07-19 RX ORDER — OLANZAPINE 5 MG/1
5-10 TABLET, ORALLY DISINTEGRATING ORAL
Status: DISCONTINUED | OUTPATIENT
Start: 2019-07-19 | End: 2019-07-19

## 2019-07-19 RX ORDER — OXYCODONE HYDROCHLORIDE 5 MG/1
5 TABLET ORAL EVERY 4 HOURS PRN
Status: DISCONTINUED | OUTPATIENT
Start: 2019-07-19 | End: 2019-07-20

## 2019-07-19 RX ADMIN — FAMOTIDINE 20 MG: 20 TABLET, FILM COATED ORAL at 08:07

## 2019-07-19 RX ADMIN — GABAPENTIN 800 MG: 400 CAPSULE ORAL at 08:07

## 2019-07-19 RX ADMIN — ACAMPROSATE CALCIUM 666 MG: 333 TABLET, DELAYED RELEASE ORAL at 08:07

## 2019-07-19 RX ADMIN — ESCITALOPRAM OXALATE 20 MG: 20 TABLET ORAL at 08:06

## 2019-07-19 RX ADMIN — OLANZAPINE 10 MG: 5 TABLET, ORALLY DISINTEGRATING ORAL at 20:49

## 2019-07-19 RX ADMIN — THIAMINE HCL TAB 100 MG 100 MG: 100 TAB at 08:07

## 2019-07-19 RX ADMIN — GABAPENTIN 800 MG: 400 CAPSULE ORAL at 22:10

## 2019-07-19 RX ADMIN — OXYCODONE HYDROCHLORIDE 5 MG: 5 TABLET ORAL at 04:57

## 2019-07-19 RX ADMIN — GABAPENTIN 800 MG: 400 CAPSULE ORAL at 14:09

## 2019-07-19 RX ADMIN — FOLIC ACID 1 MG: 1 TABLET ORAL at 08:07

## 2019-07-19 RX ADMIN — OXYCODONE HYDROCHLORIDE 5 MG: 5 TABLET ORAL at 14:13

## 2019-07-19 RX ADMIN — PROPRANOLOL HYDROCHLORIDE 10 MG: 10 TABLET ORAL at 22:09

## 2019-07-19 RX ADMIN — NICOTINE POLACRILEX 4 MG: 4 GUM, CHEWING ORAL at 17:39

## 2019-07-19 RX ADMIN — HYDROXYZINE HYDROCHLORIDE 25 MG: 25 TABLET ORAL at 22:10

## 2019-07-19 RX ADMIN — LORAZEPAM 0.5 MG: 0.5 TABLET ORAL at 22:10

## 2019-07-19 RX ADMIN — ACAMPROSATE CALCIUM 666 MG: 333 TABLET, DELAYED RELEASE ORAL at 22:09

## 2019-07-19 RX ADMIN — OXYCODONE HYDROCHLORIDE 5 MG: 5 TABLET ORAL at 18:24

## 2019-07-19 RX ADMIN — MULTIPLE VITAMINS W/ MINERALS TAB 1 TABLET: TAB at 08:07

## 2019-07-19 RX ADMIN — PROPRANOLOL HYDROCHLORIDE 10 MG: 10 TABLET ORAL at 08:07

## 2019-07-19 RX ADMIN — FAMOTIDINE 20 MG: 20 TABLET, FILM COATED ORAL at 22:09

## 2019-07-19 RX ADMIN — OXYCODONE HYDROCHLORIDE 5 MG: 5 TABLET ORAL at 10:09

## 2019-07-19 ASSESSMENT — ACTIVITIES OF DAILY LIVING (ADL)
DRESS: SCRUBS (BEHAVIORAL HEALTH)
HYGIENE/GROOMING: INDEPENDENT
ORAL_HYGIENE: INDEPENDENT
LAUNDRY: WITH SUPERVISION
HYGIENE/GROOMING: INDEPENDENT
LAUNDRY: WITH SUPERVISION
DRESS: SCRUBS (BEHAVIORAL HEALTH)
ORAL_HYGIENE: INDEPENDENT

## 2019-07-19 NOTE — PROGRESS NOTES
"Brief Medicine Note    Medicine following for follow-up of phosphorus level, CMP, INR and CBC.     Today, patient is tearful stating she does not want to be here. She was just up and walking on the floor talking to her  on the phone. She states she did not know she was going to come to a psychiatric hospital. Is eating and drinking well. No nausea or vomiting. Had BM yesterday which was normal without hematochezia or melena. No hematuria, dysuria or frequency. Reports continued pain in the RUQ that wraps around to back. She states her pain is undermanaged at this time.     Today's vital signs, medications, and nursing notes were reviewed. Labs:  ROUTINE IP LABS (Last four results)  Recent Labs   Lab 07/19/19  0828 07/18/19  1608 07/18/19  1323     --  137   POTASSIUM 4.6  --  4.4   CHLORIDE 102  --  100   CO2 26  --  27   ANIONGAP 8  --  10   *  --  121*   BUN 7  --  7   CR 0.65  --  0.63   ALEXANDRO 8.9  --  9.0   MAG 2.6*  --  2.7*   PHOS 3.7 6.1* 6.1*   PROTTOTAL 7.9  --  7.3   ALBUMIN 3.3*  --  3.1*   BILITOTAL 0.5  --  0.6   ALKPHOS 250*  --  258*   *  --  153*   *  --  226*     Recent Labs   Lab 07/19/19  0828 07/18/19  1323   WBC 5.1 4.6   RBC 4.01 3.79*   HGB 12.9 12.1   HCT 39.8 37.5   MCV 99 99   MCH 32.2 31.9   MCHC 32.4 32.3   RDW 15.1* 15.1*    116*     Recent Labs   Lab 07/19/19  0828 07/18/19  1323   INR 0.91 0.91        Glucose Values Latest Ref Rng & Units 7/18/2019 7/19/2019   Bedside Glucose (mg/dl )  - -- --   GLUCOSE 70 - 99 mg/dL 121(H) 151(H)   Some recent data might be hidden        All labs personally reviewed in [facility].  See A&P for additional results.     Unresulted Labs Ordered in the Past 30 Days of this Admission     No orders found from 6/17/2019 to 7/18/2019.             BP 95/62   Pulse 74   Temp 98.6  F (37  C) (Oral)   Resp 16   Ht 1.575 m (5' 2\")   Wt 70.5 kg (155 lb 6.4 oz)   SpO2 97%   BMI 28.42 kg/m    General: A&O. NAD. Tearful. " Appears sad.   Cardiac: RRR.  Lungs: CTA  GI: Abdomen with normal bowel sounds. Non-distended. Tenderness to RUQ. No guarding rigidity or rebound tenderness. No CVA tenderness.     A/P:  #History of alcohol abuse  #History of alcoholic hepatitis with evidence of cirrhosis  #Abdominal pain, likely due to alcoholic hepatitis:   CMP, CBC, INR obtained without acute changes today. Abdominal pain similar to pain at OSH. No acute worsening. Vital signs stable w/o fever. No leukocytosis. LFTs stable. No urinary symptoms. CT and US obtained at OSH with e/o cirrhosis w/o ascites. GI consulted and discussed pain likely secondary to alcoholic hepatitis. Was started on oxycodone 5mg q6hrs yesterday. Pain is undermanaged. Was discharged from OSH on oxycodone 5mg q4hrs.   -Will increase to oxycodone 5mg q4hrs today with plan to de-escalate tomorrow if able  -Start Ibuprofen 600mg q6hrs prn (plts improved). Continue famotidine.  -Continue lidoderm patches  -Will hold acetaminophen at this time given hepatitis  -Monitor intake and output  -Trend LFTS w/ CMP in 2 days   -Please notify internal medicine if any fever >100.4, HD compromise or e/o blood stools.     #Thrombocytopenia, resolved:   Platelets improved to 174. Likely in setting of alcohol abuse. No acute bleeding.     #Hyperphosphatemia, resolved   Phosphorus level obtained this AM normalized. Likely elevated in setting of replacement at OSH. CK WNL. Renal function stable and Ca level WNL.    Medicine will continue to follow CMP in 2 days. Please page with any additional questions or concerns.     Yue Romo PA-C  Hospitalist Service  Pager [phone]

## 2019-07-19 NOTE — PROGRESS NOTES
received a voicemail from Talat with [facility] Pre-petition ([phone]). Talat inquired about what the back up plan for the patient will be if he is not able to get the petition screened and filed with [facility] before the expiration of the hold. Talat stated that he would have to have all of the above completed by 8am Monday and stated that it likely will not happen. He provided his call back number.      returned a call to Talat with [facility] ([phone]).  informed Talat that at this time there really is no back up plan and that the patient will likely be discharged home if she demands to leave Monday. Talat stated that he was going to try his best to get to the hospital today to meet with the patient but stated that if he doesn't, he will on Monday morning.

## 2019-07-19 NOTE — PROGRESS NOTES
"[facility], [place]   Psychiatric Progress Note        Interim History:     The patient's care was discussed with the treatment team during the daily team meeting and/or staff's chart notes were reviewed.  Staff report patient spent some time outside of her room, socialized with some peers and staff members. She frequently complained of abdominal pain and severe anxiety, requested pain medication. During today's visit she presented with restricted range of affect and sounded irritable and sad. She again told me that she was not interested in CD treatment: \"I know all about CD treatments and, besides, our insurance would not pay for it. We used it too much\". When asked about her plans, replied that she would like to enroll into IOP and, then, go to a sober house. She denied Suicidal ideation, although, admitted to feeling depressed. I asked permission to talk to her  Dereck. Very reluctantly she agreed to let me talk to Dereck and I had approx. 20 min long phone conversation with him. He confirmed that he and Lima had pretty strained relationship because of her alcoholism and that he had been thinking about  her and moving out with all their 4 kids. He was firmly in support of patient going into MICD treatment program, said that previous programs were likely, unsuccessful because they didn't address core mental health issues. He also indicated that patient's meds were, probably, working for her well when she was not drinking. He said that he was not so concerned about finances and hoped that insurance would pay for one more treatment. I explained to Dereck that with patient's requests to be discharged we had only 2 options left: either to discharge her AMA or file a petition for MICD commitment. I informed Dereck that we had only one day left out of 72 hour hold and it might not be enough for the Critical access hospital screener to make a decision to support petition or not. Dereck still strongly felt " that petition had to be filed, even, said that he felt sorry that it was not done sooner.          Medications:       acamprosate  666 mg Oral TID     escitalopram  20 mg Oral Daily     famotidine  20 mg Oral BID     folic acid  1 mg Oral Daily     gabapentin  800 mg Oral TID     lidocaine  1-2 patch Transdermal Q24H     lidocaine   Transdermal Q8H     lidocaine   Transdermal Q24h     LORazepam  0.5 mg Oral At Bedtime     multivitamin w/minerals  1 tablet Oral Daily     propranolol  10 mg Oral BID     vitamin B1  100 mg Oral Daily          Allergies:     Allergies   Allergen Reactions     Cranberry Extract Hives     Morphine Other (See Comments)     Vomiting     Penicillins Unknown     Sertraline Other (See Comments)     dreams     Unasyn      Celecoxib Rash     Sulfa Drugs Rash          Labs:     Recent Results (from the past 24 hour(s))   Phosphorus    Collection Time: 07/18/19  4:08 PM   Result Value Ref Range    Phosphorus 6.1 (H) 2.5 - 4.5 mg/dL   CK total    Collection Time: 07/18/19  4:08 PM   Result Value Ref Range    CK Total 99 30 - 225 U/L   CBC with platelets    Collection Time: 07/19/19  8:28 AM   Result Value Ref Range    WBC 5.1 4.0 - 11.0 10e9/L    RBC Count 4.01 3.8 - 5.2 10e12/L    Hemoglobin 12.9 11.7 - 15.7 g/dL    Hematocrit 39.8 35.0 - 47.0 %    MCV 99 78 - 100 fl    MCH 32.2 26.5 - 33.0 pg    MCHC 32.4 31.5 - 36.5 g/dL    RDW 15.1 (H) 10.0 - 15.0 %    Platelet Count 174 150 - 450 10e9/L   Comprehensive metabolic panel    Collection Time: 07/19/19  8:28 AM   Result Value Ref Range    Sodium 136 133 - 144 mmol/L    Potassium 4.6 3.4 - 5.3 mmol/L    Chloride 102 94 - 109 mmol/L    Carbon Dioxide 26 20 - 32 mmol/L    Anion Gap 8 3 - 14 mmol/L    Glucose 151 (H) 70 - 99 mg/dL    Urea Nitrogen 7 7 - 30 mg/dL    Creatinine 0.65 0.52 - 1.04 mg/dL    GFR Estimate >90 >60 mL/min/[1.73_m2]    GFR Estimate If Black >90 >60 mL/min/[1.73_m2]    Calcium 8.9 8.5 - 10.1 mg/dL    Bilirubin Total 0.5 0.2 - 1.3  "mg/dL    Albumin 3.3 (L) 3.4 - 5.0 g/dL    Protein Total 7.9 6.8 - 8.8 g/dL    Alkaline Phosphatase 250 (H) 40 - 150 U/L     (H) 0 - 50 U/L     (H) 0 - 45 U/L   Phosphorus    Collection Time: 07/19/19  8:28 AM   Result Value Ref Range    Phosphorus 3.7 2.5 - 4.5 mg/dL   Magnesium    Collection Time: 07/19/19  8:28 AM   Result Value Ref Range    Magnesium 2.6 (H) 1.6 - 2.3 mg/dL   INR    Collection Time: 07/19/19  8:28 AM   Result Value Ref Range    INR 0.91 0.86 - 1.14          Psychiatric Examination:     BP 95/62   Pulse 74   Temp 98.6  F (37  C) (Oral)   Resp 16   Ht 1.575 m (5' 2\")   Wt 70.5 kg (155 lb 6.4 oz)   SpO2 97%   BMI 28.42 kg/m    Weight is 155 lbs 6.4 oz  Body mass index is 28.42 kg/m .  Orthostatic Vitals       Most Recent      Sitting Orthostatic /70 07/19 0746    Sitting Orthostatic Pulse (bpm) 90 07/19 0746    Standing Orthostatic /74 07/19 0746    Standing Orthostatic Pulse (bpm) 94 07/19 0746            Appearance: awake, alert and dressed in hospital scrubs  Attitude:  somewhat cooperative  Eye Contact:  fair  Mood:  angry, anxious and depressed  Affect:  constricted mobility  Speech:  decreased prosody  Psychomotor Behavior:  evidence of dystonia and intact station, gait and muscle tone  Throught Process:  goal oriented  Associations:  no loose associations  Thought Content:  no evidence of suicidal ideation or homicidal ideation  Insight:  limited  Judgement:  limited  Oriented to:  time, person, and place  Attention Span and Concentration:  fair  Recent and Remote Memory:  fair    Clinical Global Impressions  First: 7/4 7/18/2019      Most recent:            Precautions:     Behavioral Orders   Procedures     Code 1 - Restrict to Unit     Elopement precautions     Routine Programming     As clinically indicated     Status 15     Every 15 minutes.     Suicide precautions     Patients on Suicide Precautions should have a Combination Diet ordered that includes a " Diet selection(s) AND a Behavioral Tray selection for Safe Tray - with utensils, or Safe Tray - NO utensils       Withdrawal precautions          DIagnoses:     1.  Major depressive disorder, recurrent, moderate severity.   2.  Generalized anxiety disorder.   3.  History of panic disorder with agoraphobia.   4.  Alcohol use disorder, severe, with dependence.     5.  Recent alcohol withdrawal symptoms.     6.  Liver cirrhosis.          Plan:   No medication changes today. Will file petition for MICD commitment with [facility]. For now patient will be continued on 72 hour hold and on MSSA protocol with Ativan. Will continue to provide support and structure.

## 2019-07-19 NOTE — PROGRESS NOTES
Writer called [facility] to inquire about whether they would accept a petition for the patient although her hold is up at 10:15am on Monday. Writer spoke with Susan who stated that she will accept the petition but that the Critical access hospital will likely not have a decision on time before the 72 hour hold is up. Susan provided her fax information and roseannr is intending to send the pre-petition paperwork shortly.     Roseannr faxed in the petition paperwork to [facility].

## 2019-07-19 NOTE — PROGRESS NOTES
Pt was observed in the lounge with others and she stated to writer that she wants to be home. She became emotional when checking in. She reports that her liver is in bad shape if she continues using substance. She attended groups this shift and sociable. She denies suicidal ideations or hallucinations. She has good appetite and sleeping ok.     07/19/19 1408   Behavioral Health   Hallucinations denies / not responding to hallucinations   Thinking distractable   Orientation time: oriented;date: oriented;place: oriented;person: oriented   Memory baseline memory   Insight insight appropriate to situation   Judgement impaired   Eye Contact at examiner   Affect full range affect   Mood depressed;mood is calm   Physical Appearance/Attire appears stated age   Hygiene well groomed   Suicidality other (see comments)  (Denies)   1. Wish to be Dead No   2. Non-Specific Active Suicidal Thoughts  No   Self Injury other (see comment)  (Denies)   Activity other (see comment)  (Social with others)   Medication Sensitivity no stated side effects   Psychomotor / Gait balanced;steady   Psycho Education   Type of Intervention 1:1 intervention   Response participates, initiates socially appropriate   Hours 0.5   Activities of Daily Living   Hygiene/Grooming independent   Oral Hygiene independent   Dress scrubs (behavioral health)   Laundry with supervision   Room Organization independent   Activity   Activity Assistance Provided independent   .

## 2019-07-20 LAB
ALBUMIN SERPL-MCNC: 3.3 G/DL (ref 3.4–5)
ALP SERPL-CCNC: 233 U/L (ref 40–150)
ALT SERPL W P-5'-P-CCNC: 246 U/L (ref 0–50)
ANION GAP SERPL CALCULATED.3IONS-SCNC: 9 MMOL/L (ref 3–14)
AST SERPL W P-5'-P-CCNC: 163 U/L (ref 0–45)
BILIRUB SERPL-MCNC: 0.6 MG/DL (ref 0.2–1.3)
BUN SERPL-MCNC: 7 MG/DL (ref 7–30)
CALCIUM SERPL-MCNC: 9 MG/DL (ref 8.5–10.1)
CHLORIDE SERPL-SCNC: 105 MMOL/L (ref 94–109)
CO2 SERPL-SCNC: 25 MMOL/L (ref 20–32)
CREAT SERPL-MCNC: 0.59 MG/DL (ref 0.52–1.04)
ERYTHROCYTE [DISTWIDTH] IN BLOOD BY AUTOMATED COUNT: 15.2 % (ref 10–15)
GFR SERPL CREATININE-BSD FRML MDRD: >90 ML/MIN/{1.73_M2}
GLUCOSE SERPL-MCNC: 142 MG/DL (ref 70–99)
HCT VFR BLD AUTO: 39.6 % (ref 35–47)
HGB BLD-MCNC: 13 G/DL (ref 11.7–15.7)
MCH RBC QN AUTO: 32.6 PG (ref 26.5–33)
MCHC RBC AUTO-ENTMCNC: 32.8 G/DL (ref 31.5–36.5)
MCV RBC AUTO: 99 FL (ref 78–100)
PLATELET # BLD AUTO: 228 10E9/L (ref 150–450)
POTASSIUM SERPL-SCNC: 3.9 MMOL/L (ref 3.4–5.3)
PROT SERPL-MCNC: 7.9 G/DL (ref 6.8–8.8)
RBC # BLD AUTO: 3.99 10E12/L (ref 3.8–5.2)
SODIUM SERPL-SCNC: 139 MMOL/L (ref 133–144)
WBC # BLD AUTO: 4.1 10E9/L (ref 4–11)

## 2019-07-20 PROCEDURE — 36415 COLL VENOUS BLD VENIPUNCTURE: CPT | Performed by: PHYSICIAN ASSISTANT

## 2019-07-20 PROCEDURE — 99207 ZZC CDG-MDM COMPONENT: MEETS MODERATE - UP CODED: CPT | Performed by: PHYSICIAN ASSISTANT

## 2019-07-20 PROCEDURE — 25000132 ZZH RX MED GY IP 250 OP 250 PS 637: Performed by: PSYCHIATRY & NEUROLOGY

## 2019-07-20 PROCEDURE — 99232 SBSQ HOSP IP/OBS MODERATE 35: CPT | Performed by: PHYSICIAN ASSISTANT

## 2019-07-20 PROCEDURE — 25800030 ZZH RX IP 258 OP 636: Performed by: PHYSICIAN ASSISTANT

## 2019-07-20 PROCEDURE — 85027 COMPLETE CBC AUTOMATED: CPT | Performed by: PHYSICIAN ASSISTANT

## 2019-07-20 PROCEDURE — 80053 COMPREHEN METABOLIC PANEL: CPT | Performed by: PHYSICIAN ASSISTANT

## 2019-07-20 PROCEDURE — 25000132 ZZH RX MED GY IP 250 OP 250 PS 637: Performed by: PHYSICIAN ASSISTANT

## 2019-07-20 PROCEDURE — 12400001 ZZH R&B MH UMMC

## 2019-07-20 RX ORDER — OXYCODONE HYDROCHLORIDE 5 MG/1
5 TABLET ORAL EVERY 6 HOURS PRN
Status: DISCONTINUED | OUTPATIENT
Start: 2019-07-20 | End: 2019-07-24

## 2019-07-20 RX ADMIN — FOLIC ACID 1 MG: 1 TABLET ORAL at 08:37

## 2019-07-20 RX ADMIN — FAMOTIDINE 20 MG: 20 TABLET, FILM COATED ORAL at 21:06

## 2019-07-20 RX ADMIN — NICOTINE POLACRILEX 4 MG: 4 GUM, CHEWING ORAL at 21:09

## 2019-07-20 RX ADMIN — GABAPENTIN 800 MG: 400 CAPSULE ORAL at 14:03

## 2019-07-20 RX ADMIN — FAMOTIDINE 20 MG: 20 TABLET, FILM COATED ORAL at 08:37

## 2019-07-20 RX ADMIN — OXYCODONE HYDROCHLORIDE 5 MG: 5 TABLET ORAL at 08:37

## 2019-07-20 RX ADMIN — NICOTINE POLACRILEX 4 MG: 4 GUM, CHEWING ORAL at 19:46

## 2019-07-20 RX ADMIN — HYDROXYZINE HYDROCHLORIDE 25 MG: 25 TABLET ORAL at 19:47

## 2019-07-20 RX ADMIN — GABAPENTIN 800 MG: 400 CAPSULE ORAL at 08:37

## 2019-07-20 RX ADMIN — LIDOCAINE 2 PATCH: 560 PATCH PERCUTANEOUS; TOPICAL; TRANSDERMAL at 19:47

## 2019-07-20 RX ADMIN — GABAPENTIN 800 MG: 400 CAPSULE ORAL at 21:05

## 2019-07-20 RX ADMIN — HYDROXYZINE HYDROCHLORIDE 25 MG: 25 TABLET ORAL at 09:18

## 2019-07-20 RX ADMIN — ESCITALOPRAM OXALATE 20 MG: 20 TABLET ORAL at 08:38

## 2019-07-20 RX ADMIN — SODIUM CHLORIDE, POTASSIUM CHLORIDE, SODIUM LACTATE AND CALCIUM CHLORIDE 1000 ML: 600; 310; 30; 20 INJECTION, SOLUTION INTRAVENOUS at 18:12

## 2019-07-20 RX ADMIN — PROPRANOLOL HYDROCHLORIDE 10 MG: 10 TABLET ORAL at 21:05

## 2019-07-20 RX ADMIN — LORAZEPAM 0.5 MG: 0.5 TABLET ORAL at 21:05

## 2019-07-20 RX ADMIN — THIAMINE HCL TAB 100 MG 100 MG: 100 TAB at 08:37

## 2019-07-20 RX ADMIN — OXYCODONE HYDROCHLORIDE 5 MG: 5 TABLET ORAL at 17:27

## 2019-07-20 RX ADMIN — ACAMPROSATE CALCIUM 666 MG: 333 TABLET, DELAYED RELEASE ORAL at 21:05

## 2019-07-20 RX ADMIN — OXYCODONE HYDROCHLORIDE 5 MG: 5 TABLET ORAL at 12:33

## 2019-07-20 RX ADMIN — MULTIPLE VITAMINS W/ MINERALS TAB 1 TABLET: TAB at 08:37

## 2019-07-20 ASSESSMENT — ACTIVITIES OF DAILY LIVING (ADL)
DRESS: INDEPENDENT
HYGIENE/GROOMING: INDEPENDENT
ORAL_HYGIENE: INDEPENDENT

## 2019-07-20 NOTE — PLAN OF CARE
"Patient has been visible in the milieu socializing for the first half of the evening. Patient remained isolated and withdrawn in room after visiting hours.  Affect is sad, blunted and flat. Mood is depressed. Patient denies hallucinations and any SI/SIB/HI at this current time.  MSSA 2. Patient was complaining of RUQ pain and received Roxicodone 5mg PRN at 1815.    Patient found crying hysterically in her room after visitation. Patient reports her  came to visit and \"is going to divorce me.\" Patient is visibly upset and unable to articulate herself well. Writer attempted to calm patient down with breathing excercises but patient unable to follow. Patient offered medication for anxiety but she declined \"i'm going to throw it all up, I need to calm down.\" RN notified on call provider and patient received olanzapine zydis 10 mg PRN.  Update: Patient received HS medications at 2200. Patient mood is still depressed but calm. Will continue to monitor, maintain appropriate precautions.    "

## 2019-07-20 NOTE — PROGRESS NOTES
Placed PIV and initiated LR bolus per orders. Patient tolerated without difficulty. See flowsheet for documentation.

## 2019-07-20 NOTE — PROGRESS NOTES
Patient spent the majority of the shift in lounge area. Patient appears restless and dozing off in lounge chair. Patient attended community meeting but was barely able to keep her eyes open. Reported that her pain is mild today. Reports feeling depressed, sad and anxious. Denied SI/SIB.        07/20/19 1458   Behavioral Health   Hallucinations denies / not responding to hallucinations   Thinking distractable;poor concentration   Orientation person: oriented;place: oriented;date: oriented;time: oriented   Memory baseline memory   Insight admits / accepts   Judgement impaired   Eye Contact into space  (dozing off )   Affect blunted, flat;sad   Mood depressed   Physical Appearance/Attire appears stated age   Hygiene well groomed   Suicidality other (see comments)  (denies )   1. Wish to be Dead No   2. Non-Specific Active Suicidal Thoughts  No   Self Injury other (see comment)  (denies )   Elopement   (none observed/mentioned )   Activity   (visible in milieu)   Speech clear;coherent   Medication Sensitivity no stated side effects   Psychomotor / Gait balanced;steady   Psycho Education   Type of Intervention 1:1 intervention   Response participates, initiates socially appropriate   Hours 0.5   Treatment Detail   (check-in)   Safety   Suicidality Status 15   Activities of Daily Living   Hygiene/Grooming independent   Oral Hygiene independent   Dress independent   Room Organization independent   Groups   Details   (attended community meeting )

## 2019-07-20 NOTE — PROGRESS NOTES
"Pt noted to be sound asleep, needing to be \"nudged\" to wake up for 1400 meds. She is requesting oxycodone right at the 4 hour priyanka.  "

## 2019-07-20 NOTE — PROGRESS NOTES
"Brief Medicine Note    Medicine following for follow-up abdominal pain as well as labs including CBC and CMP. CBC today with stable H/H. No leukocytosis. Plts 228. CMP with stable renal function. No acute electrolyte abnormalities. LFT with AlkP 233, , . Normal T bili.      On assessment, patient is resting in bed comfortably. Easily aroused. Answering questions appropriately. She reports feeling, \"fine.\" She reports the pain continues to be similar. No nausea/vomiting and she reports tolerating PO intake. No fevers or chills. Last BM was this AM and was normal without hematochezia or melena. No diarrhea. No urinary symptoms. She is willing to reduce the oxycodone dose.     Today's vital signs, medications, and nursing notes were reviewed.     BP 94/68   Pulse 86   Temp 98.2  F (36.8  C) (Oral)   Resp 16   Ht 1.575 m (5' 2\")   Wt 70.5 kg (155 lb 6.4 oz)   SpO2 98%   BMI 28.42 kg/m    GENERAL: Alert and oriented x 3. Appears comfortable. Answering questions appropriately.   HEENT: Anicteric sclera. Mucous membranes moist.   CV: RRR. S1, S2. No murmurs appreciated.   RESPIRATORY: Effort normal on room air. Lungs CTAB with no wheezing, rales, rhonchi.   GI: Abdomen soft and non distended, bowel sounds present. Tenderness to RUQ without guarding or rigidity.     A/P:  #History of alcohol abuse  #History of alcoholic hepatitis with evidence of cirrhosis  #Abdominal pain, likely due to alcoholic hepatitis:   CMP, CBC without acute changes today. Plts normalized. H/H stable. Abdominal pain appears improved today. Exam with tenderness in RUQ similar to yesterday. Afebrile, no leukocytosis.   -Will decrease oxycodone to 5mg q6hrs today with plan to taper off  -Continue famotidine.  -Continue lidoderm patches  -Will hold acetaminophen at this time given hepatitis  -Monitor intake and output  -Trend CBC, CMP in AM   -Will need follow-up with OP GI for hospital follow-up and for EGD   -Please notify " internal medicine if any fever >100.4, BP <90/60, or if any worsening abdominal pain, N/V or hematemesis, hematochezia or melena.     Orthostatic hypotension, asymptomatic: Noted to have soft BP ~ 90/60. Asymptomatic. H/H stable. Was started on Olanzapine 5mg prn last evening and had a dose at 2050. Other medications include propranolol 10mg BID. Current BP 95/66. Tolerating PO intake. No chest pain, SOB, dizziness or lightheadedness. Walking around the unit without difficulty. She states her BP normally run low. She is demanding pain medication. No signs or symptoms of sepsis or infection.   -Consider discontinuing olanzapine per psychiatry  -Give 1000c bolus of LR now  -Encourage PO intake   -Hold propranolol if SBP <110 or HR <60   -Please obtain vital signs x2 within next 5 hours.   -Please notify internal medicine on-call provider if BP <90/60, or if chest pain, sob, dizziness, lightheadedness or pre-syncope/syncope.   -Discussed with Cinda Barber as sign out to follow-up.     Medicine will continue to continue to follow-up on BP as well as for CBC, CMP in AM. Please call with any additional questions or concerns.     Yue Romo PA-C  Hospitalist Service  Pager [phone]

## 2019-07-21 LAB
ALBUMIN SERPL-MCNC: 2.8 G/DL (ref 3.4–5)
ALBUMIN UR-MCNC: NEGATIVE MG/DL
ALP SERPL-CCNC: 180 U/L (ref 40–150)
ALT SERPL W P-5'-P-CCNC: 219 U/L (ref 0–50)
ANION GAP SERPL CALCULATED.3IONS-SCNC: 4 MMOL/L (ref 3–14)
APPEARANCE UR: CLEAR
AST SERPL W P-5'-P-CCNC: 129 U/L (ref 0–45)
BACTERIA #/AREA URNS HPF: ABNORMAL /HPF
BILIRUB SERPL-MCNC: 0.4 MG/DL (ref 0.2–1.3)
BILIRUB UR QL STRIP: NEGATIVE
BUN SERPL-MCNC: 8 MG/DL (ref 7–30)
CALCIUM SERPL-MCNC: 8.4 MG/DL (ref 8.5–10.1)
CHLORIDE SERPL-SCNC: 109 MMOL/L (ref 94–109)
CO2 SERPL-SCNC: 29 MMOL/L (ref 20–32)
COLOR UR AUTO: ABNORMAL
CREAT SERPL-MCNC: 0.6 MG/DL (ref 0.52–1.04)
ERYTHROCYTE [DISTWIDTH] IN BLOOD BY AUTOMATED COUNT: 15.2 % (ref 10–15)
GFR SERPL CREATININE-BSD FRML MDRD: >90 ML/MIN/{1.73_M2}
GLUCOSE SERPL-MCNC: 107 MG/DL (ref 70–99)
GLUCOSE UR STRIP-MCNC: NEGATIVE MG/DL
HCT VFR BLD AUTO: 34.5 % (ref 35–47)
HGB BLD-MCNC: 11.1 G/DL (ref 11.7–15.7)
HGB UR QL STRIP: NEGATIVE
KETONES UR STRIP-MCNC: NEGATIVE MG/DL
LEUKOCYTE ESTERASE UR QL STRIP: NEGATIVE
MCH RBC QN AUTO: 31.7 PG (ref 26.5–33)
MCHC RBC AUTO-ENTMCNC: 32.2 G/DL (ref 31.5–36.5)
MCV RBC AUTO: 99 FL (ref 78–100)
NITRATE UR QL: NEGATIVE
PH UR STRIP: 7.5 PH (ref 5–7)
PLATELET # BLD AUTO: 211 10E9/L (ref 150–450)
POTASSIUM SERPL-SCNC: 3.8 MMOL/L (ref 3.4–5.3)
PROT SERPL-MCNC: 6.7 G/DL (ref 6.8–8.8)
RBC # BLD AUTO: 3.5 10E12/L (ref 3.8–5.2)
RBC #/AREA URNS AUTO: <1 /HPF (ref 0–2)
SODIUM SERPL-SCNC: 142 MMOL/L (ref 133–144)
SOURCE: ABNORMAL
SP GR UR STRIP: 1 (ref 1–1.03)
UROBILINOGEN UR STRIP-MCNC: NORMAL MG/DL (ref 0–2)
WBC # BLD AUTO: 3.8 10E9/L (ref 4–11)
WBC #/AREA URNS AUTO: 0 /HPF (ref 0–5)

## 2019-07-21 PROCEDURE — 25000132 ZZH RX MED GY IP 250 OP 250 PS 637: Performed by: PHYSICIAN ASSISTANT

## 2019-07-21 PROCEDURE — 25000132 ZZH RX MED GY IP 250 OP 250 PS 637: Performed by: PSYCHIATRY & NEUROLOGY

## 2019-07-21 PROCEDURE — 81001 URINALYSIS AUTO W/SCOPE: CPT | Performed by: PHYSICIAN ASSISTANT

## 2019-07-21 PROCEDURE — 12400001 ZZH R&B MH UMMC

## 2019-07-21 PROCEDURE — 36415 COLL VENOUS BLD VENIPUNCTURE: CPT | Performed by: PHYSICIAN ASSISTANT

## 2019-07-21 PROCEDURE — 80053 COMPREHEN METABOLIC PANEL: CPT | Performed by: PHYSICIAN ASSISTANT

## 2019-07-21 PROCEDURE — 99207 ZZC NO CHARGE VISIT/PATIENT NOT SEEN: CPT | Performed by: PHYSICIAN ASSISTANT

## 2019-07-21 PROCEDURE — 25000132 ZZH RX MED GY IP 250 OP 250 PS 637: Performed by: NURSE PRACTITIONER

## 2019-07-21 PROCEDURE — 85027 COMPLETE CBC AUTOMATED: CPT | Performed by: PHYSICIAN ASSISTANT

## 2019-07-21 RX ORDER — HYDROXYZINE HYDROCHLORIDE 50 MG/1
50 TABLET, FILM COATED ORAL EVERY 6 HOURS PRN
Status: DISCONTINUED | OUTPATIENT
Start: 2019-07-21 | End: 2019-08-06 | Stop reason: HOSPADM

## 2019-07-21 RX ADMIN — GABAPENTIN 800 MG: 400 CAPSULE ORAL at 08:50

## 2019-07-21 RX ADMIN — NICOTINE POLACRILEX 4 MG: 4 GUM, CHEWING ORAL at 18:42

## 2019-07-21 RX ADMIN — OXYCODONE HYDROCHLORIDE 5 MG: 5 TABLET ORAL at 21:13

## 2019-07-21 RX ADMIN — HYDROXYZINE HYDROCHLORIDE 50 MG: 50 TABLET, FILM COATED ORAL at 18:42

## 2019-07-21 RX ADMIN — FAMOTIDINE 20 MG: 20 TABLET, FILM COATED ORAL at 22:39

## 2019-07-21 RX ADMIN — MULTIPLE VITAMINS W/ MINERALS TAB 1 TABLET: TAB at 08:51

## 2019-07-21 RX ADMIN — ACAMPROSATE CALCIUM 666 MG: 333 TABLET, DELAYED RELEASE ORAL at 14:14

## 2019-07-21 RX ADMIN — ACAMPROSATE CALCIUM 666 MG: 333 TABLET, DELAYED RELEASE ORAL at 08:50

## 2019-07-21 RX ADMIN — ACAMPROSATE CALCIUM 666 MG: 333 TABLET, DELAYED RELEASE ORAL at 22:39

## 2019-07-21 RX ADMIN — GABAPENTIN 800 MG: 400 CAPSULE ORAL at 14:15

## 2019-07-21 RX ADMIN — OXYCODONE HYDROCHLORIDE 5 MG: 5 TABLET ORAL at 08:50

## 2019-07-21 RX ADMIN — NICOTINE POLACRILEX 4 MG: 4 GUM, CHEWING ORAL at 20:30

## 2019-07-21 RX ADMIN — ESCITALOPRAM OXALATE 20 MG: 20 TABLET ORAL at 08:55

## 2019-07-21 RX ADMIN — LORAZEPAM 0.5 MG: 0.5 TABLET ORAL at 22:38

## 2019-07-21 RX ADMIN — GABAPENTIN 800 MG: 400 CAPSULE ORAL at 22:38

## 2019-07-21 RX ADMIN — OLANZAPINE 10 MG: 10 TABLET, FILM COATED ORAL at 14:28

## 2019-07-21 RX ADMIN — NICOTINE POLACRILEX 4 MG: 4 GUM, CHEWING ORAL at 22:43

## 2019-07-21 RX ADMIN — THIAMINE HCL TAB 100 MG 100 MG: 100 TAB at 08:54

## 2019-07-21 RX ADMIN — OXYCODONE HYDROCHLORIDE 5 MG: 5 TABLET ORAL at 14:57

## 2019-07-21 RX ADMIN — NICOTINE POLACRILEX 4 MG: 4 GUM, CHEWING ORAL at 13:29

## 2019-07-21 RX ADMIN — FOLIC ACID 1 MG: 1 TABLET ORAL at 08:51

## 2019-07-21 RX ADMIN — PROPRANOLOL HYDROCHLORIDE 10 MG: 10 TABLET ORAL at 22:39

## 2019-07-21 RX ADMIN — TRAZODONE HYDROCHLORIDE 50 MG: 50 TABLET ORAL at 22:39

## 2019-07-21 RX ADMIN — FAMOTIDINE 20 MG: 20 TABLET, FILM COATED ORAL at 08:54

## 2019-07-21 RX ADMIN — NICOTINE POLACRILEX 4 MG: 4 GUM, CHEWING ORAL at 16:15

## 2019-07-21 RX ADMIN — LIDOCAINE 2 PATCH: 560 PATCH PERCUTANEOUS; TOPICAL; TRANSDERMAL at 21:13

## 2019-07-21 RX ADMIN — HYDROXYZINE HYDROCHLORIDE 25 MG: 25 TABLET ORAL at 10:52

## 2019-07-21 RX ADMIN — TRAZODONE HYDROCHLORIDE 50 MG: 50 TABLET ORAL at 00:39

## 2019-07-21 RX ADMIN — OXYCODONE HYDROCHLORIDE 5 MG: 5 TABLET ORAL at 00:39

## 2019-07-21 ASSESSMENT — MIFFLIN-ST. JEOR: SCORE: 1319.95

## 2019-07-21 ASSESSMENT — ACTIVITIES OF DAILY LIVING (ADL)
HYGIENE/GROOMING: HANDWASHING;SHOWER;INDEPENDENT
ORAL_HYGIENE: INDEPENDENT
DRESS: SCRUBS (BEHAVIORAL HEALTH);INDEPENDENT

## 2019-07-21 NOTE — PROGRESS NOTES
"Pt requested for  pain medication and \" something to help her sleep\". PRN Oxycodone and Trazodone given at about 0039AM.      "

## 2019-07-21 NOTE — PROGRESS NOTES
Medicine following BP and Labs. See Yue Khanna note from 07/20 for further details.     BP stable overnight. No further IVF required. Labs continue to downtrend.     Will repeat hepatic panel Tuesday to ensure continued downtrend.     Medicine will follow.     GUSTAVO Perry

## 2019-07-21 NOTE — PLAN OF CARE
"Pt is calm and pleasant. Affect is a bit sad, when talking about her . She said he filed for divorce. Pt brightens some on approach, is in the milieu, going to grps, and and is social w peers. She describes her mood as \"kind of sad and flat.\" She denies si; rates depression 3, anxiety 6. She endorses racing thoughts. Pt states she has difficulty both getting to, and staying asleep; states she has always had \"night terrors, \"also. Her goal for the day is to have a good visit with her mom. Her BP was on the low side this am; fluids and activity encouraged. Slightly better on re check. Pt is asymptomatic.    1221) Pt is a bit more anxious today, vs yesterday. She became upset and crying, when another pt asked her to move something off a table-had a bit of a difficult time letting this go. She was also talking about how she helps and cares for other pts. She was disappointed when told she could not have cake brought in \"to have fun, and help pts get along.\" Support and reassurance given, also vistaril for anxiety.    1437) Vistaril dose increased, per Cesar NAVAS NP. Pt insisted on NP being paged for this-\"You don't want to see me have a meltdown.\" Her  was here to visit, this afternoon. Pt also requested Zyprexa. She said this really helped her \"the time I was having a meltdown.  "

## 2019-07-22 PROCEDURE — 25000132 ZZH RX MED GY IP 250 OP 250 PS 637: Performed by: NURSE PRACTITIONER

## 2019-07-22 PROCEDURE — 25000132 ZZH RX MED GY IP 250 OP 250 PS 637: Performed by: PHYSICIAN ASSISTANT

## 2019-07-22 PROCEDURE — 12400001 ZZH R&B MH UMMC

## 2019-07-22 PROCEDURE — 25000132 ZZH RX MED GY IP 250 OP 250 PS 637: Performed by: PSYCHIATRY & NEUROLOGY

## 2019-07-22 PROCEDURE — 99232 SBSQ HOSP IP/OBS MODERATE 35: CPT | Performed by: PSYCHIATRY & NEUROLOGY

## 2019-07-22 PROCEDURE — H2032 ACTIVITY THERAPY, PER 15 MIN: HCPCS

## 2019-07-22 RX ORDER — QUETIAPINE FUMARATE 25 MG/1
25 TABLET, FILM COATED ORAL 3 TIMES DAILY PRN
Status: DISCONTINUED | OUTPATIENT
Start: 2019-07-22 | End: 2019-07-30

## 2019-07-22 RX ORDER — DULOXETIN HYDROCHLORIDE 30 MG/1
30 CAPSULE, DELAYED RELEASE ORAL DAILY
Status: DISCONTINUED | OUTPATIENT
Start: 2019-07-22 | End: 2019-08-06 | Stop reason: HOSPADM

## 2019-07-22 RX ADMIN — OLANZAPINE 10 MG: 10 TABLET, FILM COATED ORAL at 09:01

## 2019-07-22 RX ADMIN — FAMOTIDINE 20 MG: 20 TABLET, FILM COATED ORAL at 08:12

## 2019-07-22 RX ADMIN — FAMOTIDINE 20 MG: 20 TABLET, FILM COATED ORAL at 22:32

## 2019-07-22 RX ADMIN — PROPRANOLOL HYDROCHLORIDE 10 MG: 10 TABLET ORAL at 22:32

## 2019-07-22 RX ADMIN — ESCITALOPRAM OXALATE 20 MG: 20 TABLET ORAL at 08:12

## 2019-07-22 RX ADMIN — NICOTINE POLACRILEX 4 MG: 4 GUM, CHEWING ORAL at 14:18

## 2019-07-22 RX ADMIN — GABAPENTIN 800 MG: 400 CAPSULE ORAL at 14:15

## 2019-07-22 RX ADMIN — DULOXETINE HYDROCHLORIDE 30 MG: 30 CAPSULE, DELAYED RELEASE ORAL at 15:55

## 2019-07-22 RX ADMIN — MULTIPLE VITAMINS W/ MINERALS TAB 1 TABLET: TAB at 08:12

## 2019-07-22 RX ADMIN — HYDROXYZINE HYDROCHLORIDE 50 MG: 50 TABLET, FILM COATED ORAL at 10:40

## 2019-07-22 RX ADMIN — GABAPENTIN 800 MG: 400 CAPSULE ORAL at 08:12

## 2019-07-22 RX ADMIN — NICOTINE POLACRILEX 4 MG: 4 GUM, CHEWING ORAL at 20:44

## 2019-07-22 RX ADMIN — ACAMPROSATE CALCIUM 666 MG: 333 TABLET, DELAYED RELEASE ORAL at 08:12

## 2019-07-22 RX ADMIN — NICOTINE POLACRILEX 4 MG: 4 GUM, CHEWING ORAL at 10:40

## 2019-07-22 RX ADMIN — LORAZEPAM 0.5 MG: 0.5 TABLET ORAL at 22:32

## 2019-07-22 RX ADMIN — ACAMPROSATE CALCIUM 666 MG: 333 TABLET, DELAYED RELEASE ORAL at 22:32

## 2019-07-22 RX ADMIN — LIDOCAINE 1 PATCH: 560 PATCH PERCUTANEOUS; TOPICAL; TRANSDERMAL at 22:31

## 2019-07-22 RX ADMIN — NICOTINE POLACRILEX 4 MG: 4 GUM, CHEWING ORAL at 09:40

## 2019-07-22 RX ADMIN — OLANZAPINE 10 MG: 10 TABLET, FILM COATED ORAL at 17:52

## 2019-07-22 RX ADMIN — OXYCODONE HYDROCHLORIDE 5 MG: 5 TABLET ORAL at 14:18

## 2019-07-22 RX ADMIN — OXYCODONE HYDROCHLORIDE 5 MG: 5 TABLET ORAL at 20:44

## 2019-07-22 RX ADMIN — GABAPENTIN 800 MG: 400 CAPSULE ORAL at 22:32

## 2019-07-22 RX ADMIN — THIAMINE HCL TAB 100 MG 100 MG: 100 TAB at 08:12

## 2019-07-22 RX ADMIN — NICOTINE POLACRILEX 4 MG: 4 GUM, CHEWING ORAL at 17:54

## 2019-07-22 RX ADMIN — ACAMPROSATE CALCIUM 666 MG: 333 TABLET, DELAYED RELEASE ORAL at 14:15

## 2019-07-22 RX ADMIN — FOLIC ACID 1 MG: 1 TABLET ORAL at 08:12

## 2019-07-22 RX ADMIN — OXYCODONE HYDROCHLORIDE 5 MG: 5 TABLET ORAL at 08:12

## 2019-07-22 RX ADMIN — TRAZODONE HYDROCHLORIDE 50 MG: 50 TABLET ORAL at 22:31

## 2019-07-22 ASSESSMENT — ACTIVITIES OF DAILY LIVING (ADL)
DRESS: SCRUBS (BEHAVIORAL HEALTH)
HYGIENE/GROOMING: INDEPENDENT
ORAL_HYGIENE: INDEPENDENT
LAUNDRY: WITH SUPERVISION

## 2019-07-22 NOTE — PROGRESS NOTES
"[facility], [place]   Psychiatric Progress Note        Interim History:     The patient's care was discussed with the treatment team during the daily team meeting and/or staff's chart notes were reviewed.  Staff report patient spent some time outside of her room, socialized with some peers and staff members. She reported high anxiety after she found out that petition was supported and she would have her preliminary hearing as early as tomorrow. She was also very fearful of her   her. Said she was also suffering a lot from pain in her joints (she has lupus) and asked to see pain specialist to address pain management. She denied Suicidal ideation, during today interview appeared to be somewhat more animated and less irritable, but anxious and fidgety. We discussed different treatment approaches, agreed to start Cymbalta and prn Seroquel.         Medications:       acamprosate  666 mg Oral TID     DULoxetine  30 mg Oral Daily     escitalopram  20 mg Oral Daily     famotidine  20 mg Oral BID     folic acid  1 mg Oral Daily     gabapentin  800 mg Oral TID     lidocaine  1-2 patch Transdermal Q24H     lidocaine   Transdermal Q8H     lidocaine   Transdermal Q24h     LORazepam  0.5 mg Oral At Bedtime     multivitamin w/minerals  1 tablet Oral Daily     propranolol  10 mg Oral BID     vitamin B1  100 mg Oral Daily          Allergies:     Allergies   Allergen Reactions     Cranberry Extract Hives     Morphine Other (See Comments)     Vomiting     Penicillins Unknown     Sertraline Other (See Comments)     dreams     Unasyn      Celecoxib Rash     Sulfa Drugs Rash          Labs:     No results found for this or any previous visit (from the past 24 hour(s)).       Psychiatric Examination:     /84   Pulse 80   Temp 98.6  F (37  C) (Oral)   Resp 16   Ht 1.575 m (5' 2\")   Wt 70.7 kg (155 lb 12.8 oz)   SpO2 97%   BMI 28.50 kg/m    Weight is 155 lbs 12.8 oz  Body mass index " is 28.5 kg/m .    Orthostatic Vitals       Most Recent      Sitting Orthostatic /73 07/22 0812    Sitting Orthostatic Pulse (bpm) 71 07/22 0812    Standing Orthostatic BP 89/66 07/22 0812    Standing Orthostatic Pulse (bpm) 101 07/22 0812         Appearance: awake, alert and dressed in hospital scrubs  Attitude:  somewhat cooperative  Eye Contact:  fair  Mood: anxious and depressed  Affect:  constricted mobility, more animated today  Speech:  decreased prosody  Psychomotor Behavior:  evidence of dystonia and intact station, gait and muscle tone  Throught Process:  goal oriented  Associations:  no loose associations  Thought Content:  no evidence of suicidal ideation or homicidal ideation  Insight:  limited  Judgement:  limited  Oriented to:  time, person, and place  Attention Span and Concentration:  fair  Recent and Remote Memory:  fair    Clinical Global Impressions  First: 7/4 7/18/2019      Most recent:            Precautions:     Behavioral Orders   Procedures     Code 1 - Restrict to Unit     Elopement precautions     Routine Programming     As clinically indicated     Status 15     Every 15 minutes.     Suicide precautions     Patients on Suicide Precautions should have a Combination Diet ordered that includes a Diet selection(s) AND a Behavioral Tray selection for Safe Tray - with utensils, or Safe Tray - NO utensils       Withdrawal precautions          DIagnoses:     1.  Major depressive disorder, recurrent, moderate severity.   2.  Generalized anxiety disorder.   3.  History of panic disorder with agoraphobia.   4.  Alcohol use disorder, severe, with dependence.     5.  Recent alcohol withdrawal symptoms.     6.  Liver cirrhosis.          Plan:   Petition was supported and first hearing is tomorrow. Will as per discussion above start Cymbalta and prn Seroquel (Most common side effects were discussed with the patient to the patient's satisfaction.). Will continue to provide support and structure.

## 2019-07-22 NOTE — PROVIDER NOTIFICATION
07/22/19 1400   Behavioral Health   Hallucinations denies / not responding to hallucinations   Thinking intact   Orientation person: oriented;place: oriented   Memory baseline memory   Insight poor   Judgement impaired   Eye Contact at examiner   Affect full range affect   Mood depressed;anxious;mood is calm   Physical Appearance/Attire attire appropriate to age and situation;neat   Hygiene well groomed   Suicidality other (see comments)  (Denies)   1. Wish to be Dead No   Activity other (see comment)  (Acitve and visible)   Speech clear;coherent     Pt. Was active and visible in the milieu. She attended groups and had all meals. She denied SI/SIB/HI.

## 2019-07-22 NOTE — PROGRESS NOTES
spoke with the patient and informed her that [facility] has decided to support the petition for commitment. Roseannr informed the patient that she has been placed on a court hold and that writer is now currently waiting on the court documents to be faxed to the unit.  briefly went over the process of commitment with the patient stating that she will have what is considered a preliminary hearing where she will be examined by a court ordered examiner followed by a commitment hearing. The patient stated that she has not gone through this process before and feels that her  is the person who started this process. She stated that she had a visit with him yesterday and feels that it went better than the first one that they had on the unit. She stated that she knows her  is concerned about her and rightfully so. She stated that she has no one to blame but herself and stated that she will not freak out or do anything stupid. The patient requested to not go back to group and sit in the lounge and color.

## 2019-07-22 NOTE — PROGRESS NOTES
received a voicemail from [employer] with [facility] ([phone]) stating that she received the referral for the patient for case management services. Grecia stated that she knows the patient has court tomorrow and wanted an update about how the patient was doing. Grecia also inquired about what the recommendations are for the patient. She provided her contact information and requested a return call.     returned a call to [employer] with [facility] ([phone]).  left a voicemail informing Grecia that the patient took the news of the support for commitment well.  informed Grecia that the recommendation for the patient at this point is a treatment program.  provided her contact information and requested a return call.

## 2019-07-22 NOTE — PROGRESS NOTES
Writer received a copy of the patient's court hold. A copy was given to the patient, placed in her paper chart, and put into the HIM to be scanned into the patient's chart. The patient is scheduled for a preliminary hearing on Tuesday, July 23rd at 8:30am.

## 2019-07-22 NOTE — PROGRESS NOTES
Writer spoke with Talat from [facility]petition ([phone]). He stated that he was able to meet with the patient on Friday and speak to the patient's . He reported that they are going to support the petition for commitment. Talat stated that he is in the process of getting the documentation to the courts for a court hold. He also stated that he will make a referral for the patient for case management services.

## 2019-07-22 NOTE — PROGRESS NOTES
Pt had high anxiety during this shift. Pt requested zyprexa to address her anxiety and was given 10 mg at 0901. Pt was told the petition for her commitment would be supported which caused her to panic. Pt became tearful as she believes her  has also filed for divorce. Pt continued to endorse high anxiety at which it was suggested she try some coping skills such as breathing or meditation.     Pts blood pressure was within normal limits, but did not meet parameters for propanolol. Therefore it was help this morning.

## 2019-07-23 LAB
ALBUMIN SERPL-MCNC: 3.2 G/DL (ref 3.4–5)
ALP SERPL-CCNC: 188 U/L (ref 40–150)
ALT SERPL W P-5'-P-CCNC: 246 U/L (ref 0–50)
AST SERPL W P-5'-P-CCNC: 120 U/L (ref 0–45)
BILIRUB DIRECT SERPL-MCNC: 0.2 MG/DL (ref 0–0.2)
BILIRUB SERPL-MCNC: 0.4 MG/DL (ref 0.2–1.3)
PROT SERPL-MCNC: 7.5 G/DL (ref 6.8–8.8)

## 2019-07-23 PROCEDURE — G0177 OPPS/PHP; TRAIN & EDUC SERV: HCPCS

## 2019-07-23 PROCEDURE — 25000132 ZZH RX MED GY IP 250 OP 250 PS 637: Performed by: PHYSICIAN ASSISTANT

## 2019-07-23 PROCEDURE — 25000132 ZZH RX MED GY IP 250 OP 250 PS 637: Performed by: NURSE PRACTITIONER

## 2019-07-23 PROCEDURE — 36415 COLL VENOUS BLD VENIPUNCTURE: CPT | Performed by: PHYSICIAN ASSISTANT

## 2019-07-23 PROCEDURE — 12400001 ZZH R&B MH UMMC

## 2019-07-23 PROCEDURE — 80076 HEPATIC FUNCTION PANEL: CPT | Performed by: PHYSICIAN ASSISTANT

## 2019-07-23 PROCEDURE — 25000132 ZZH RX MED GY IP 250 OP 250 PS 637: Performed by: PSYCHIATRY & NEUROLOGY

## 2019-07-23 RX ADMIN — MULTIPLE VITAMINS W/ MINERALS TAB 1 TABLET: TAB at 07:54

## 2019-07-23 RX ADMIN — LORAZEPAM 0.5 MG: 0.5 TABLET ORAL at 21:53

## 2019-07-23 RX ADMIN — OLANZAPINE 10 MG: 10 TABLET, FILM COATED ORAL at 07:53

## 2019-07-23 RX ADMIN — ACAMPROSATE CALCIUM 666 MG: 333 TABLET, DELAYED RELEASE ORAL at 21:53

## 2019-07-23 RX ADMIN — THIAMINE HCL TAB 100 MG 100 MG: 100 TAB at 07:53

## 2019-07-23 RX ADMIN — OXYCODONE HYDROCHLORIDE 5 MG: 5 TABLET ORAL at 14:26

## 2019-07-23 RX ADMIN — GABAPENTIN 800 MG: 400 CAPSULE ORAL at 13:03

## 2019-07-23 RX ADMIN — OXYCODONE HYDROCHLORIDE 5 MG: 5 TABLET ORAL at 07:53

## 2019-07-23 RX ADMIN — LIDOCAINE 1 PATCH: 560 PATCH PERCUTANEOUS; TOPICAL; TRANSDERMAL at 20:25

## 2019-07-23 RX ADMIN — OXYCODONE HYDROCHLORIDE 5 MG: 5 TABLET ORAL at 20:24

## 2019-07-23 RX ADMIN — NICOTINE POLACRILEX 4 MG: 4 GUM, CHEWING ORAL at 12:17

## 2019-07-23 RX ADMIN — FAMOTIDINE 20 MG: 20 TABLET, FILM COATED ORAL at 07:54

## 2019-07-23 RX ADMIN — ESCITALOPRAM OXALATE 20 MG: 20 TABLET ORAL at 07:53

## 2019-07-23 RX ADMIN — QUETIAPINE FUMARATE 25 MG: 25 TABLET ORAL at 12:17

## 2019-07-23 RX ADMIN — PROPRANOLOL HYDROCHLORIDE 10 MG: 10 TABLET ORAL at 21:53

## 2019-07-23 RX ADMIN — GABAPENTIN 800 MG: 400 CAPSULE ORAL at 07:53

## 2019-07-23 RX ADMIN — ACAMPROSATE CALCIUM 666 MG: 333 TABLET, DELAYED RELEASE ORAL at 13:03

## 2019-07-23 RX ADMIN — TRAZODONE HYDROCHLORIDE 50 MG: 50 TABLET ORAL at 21:53

## 2019-07-23 RX ADMIN — FOLIC ACID 1 MG: 1 TABLET ORAL at 07:54

## 2019-07-23 RX ADMIN — ACAMPROSATE CALCIUM 666 MG: 333 TABLET, DELAYED RELEASE ORAL at 07:53

## 2019-07-23 RX ADMIN — DULOXETINE HYDROCHLORIDE 30 MG: 30 CAPSULE, DELAYED RELEASE ORAL at 07:54

## 2019-07-23 RX ADMIN — FAMOTIDINE 20 MG: 20 TABLET, FILM COATED ORAL at 21:53

## 2019-07-23 RX ADMIN — NICOTINE POLACRILEX 4 MG: 4 GUM, CHEWING ORAL at 14:26

## 2019-07-23 RX ADMIN — NICOTINE POLACRILEX 4 MG: 4 GUM, CHEWING ORAL at 21:12

## 2019-07-23 RX ADMIN — HYDROXYZINE HYDROCHLORIDE 50 MG: 50 TABLET, FILM COATED ORAL at 14:26

## 2019-07-23 RX ADMIN — GABAPENTIN 800 MG: 400 CAPSULE ORAL at 21:53

## 2019-07-23 RX ADMIN — QUETIAPINE FUMARATE 25 MG: 25 TABLET ORAL at 19:10

## 2019-07-23 RX ADMIN — PROPRANOLOL HYDROCHLORIDE 10 MG: 10 TABLET ORAL at 07:54

## 2019-07-23 ASSESSMENT — ACTIVITIES OF DAILY LIVING (ADL)
HYGIENE/GROOMING: INDEPENDENT
DRESS: INDEPENDENT
ORAL_HYGIENE: INDEPENDENT

## 2019-07-23 ASSESSMENT — MIFFLIN-ST. JEOR: SCORE: 1316.78

## 2019-07-23 NOTE — PROGRESS NOTES
07/22/19 2128   Therapeutic Recreation   Type of Intervention structured groups   Activity game   Response Participates, initiates socially appropriate   Hours 1     Pt participated in Therapeutic Recreation group with focus on stress reduction, creative expression, and leisure participation. Engaged and cooperative in group recreational group via a group drawing game. Pt participated throughout majority of the group. Pt added to group discussion, sociable, and was appropriate with interactions. Showed progress in session goals. Pt mood was calm.

## 2019-07-23 NOTE — PROGRESS NOTES
spoke with Grecia from [facility] ([phone]). Grecia stated that she will be at the patient's court date today and was curious what the clinical team thought about the patient possibly going to an IRTs. Grecia stated that she thinks the patient would benefit from more exploration of why she continues to use substances. She stated that she believes the patient would also benefit from structure. Grecia stated that she is going to attempt to complete the patient's intake today while she is at court. She reported that the patient has been assigned to Wes ([phone]) for case management services and his supervisor is Cindy CARBAJAL ([phone]). Grecia stated that she will call writer to inform her about what happened at court.     received two voicemails from Grecia with [facility]. Grecia stated that the patient waived her right to a pre-trial and reported that the patient will have an examination on Tuesday, July 30th at 8:30am and a trial on August 1st at 8:30am. Grecia also stated that the patient's assigned  (Wes) and his supervisor (Cindy) will be coming to meet with the patient on Tuesday, July 30th at 1:00pm. Grecia stated that she realized the patient has private insurance and they will likely not pay for an IRTs. She stated that in her conversation with the patient, the patient is interested in going to treatment at [facility]. Grecia provided her contact information in case  needed to return a call to her.      received a copy of the patient's court paperwork. A copy has been placed in the patient's paper chart.

## 2019-07-23 NOTE — PLAN OF CARE
48 hour nursing assessment: Pt woke up anxious about going to her preliminary hearing this morning. Pt requested extra medication for her anxiety before going to court. Pt was given 10 mg of zyprexa to help prevent anxiety attack on the way to court. Pt was taken to court around 10 without incident. Pt returned about 12 and was cooperative with a search. Pt was extremely anxious and tearful upon returning from court because while she was there she was also served with divorce papers. Pt was given 25 mg of seroquel per order. Pt continued coloring in the lounge the rest of the shift.     Pt describes her mood as sad and anxious and she was tearful upon coming back to the unit. Pt was able to contract for safety and denies SI/SIB. Pt was pleasant with staff and peers on the unit.     Blood pressure was within normal range this shift. Pt had no new complaints of pain, but experiences chronic pain.

## 2019-07-23 NOTE — PROGRESS NOTES
Pt was observed in the [facility] with other peers watching Movies and attended groups. She denies suicidal ideations and hallucinations. She stated to writer that her  put hold for not attending court tomorrow.But no note to verify that she is not going to go to court tomorrow. She reports doing better and coloring in the [facility]     07/22/19 2128   Behavioral Health   Hallucinations denies / not responding to hallucinations   Thinking intact   Orientation time: oriented;date: oriented;place: oriented;person: oriented   Memory baseline memory   Insight poor   Judgement impaired   Eye Contact at examiner   Affect full range affect   Mood mood is calm;depressed   Physical Appearance/Attire appears stated age   Hygiene well groomed   Suicidality other (see comments)  (Denies)   1. Wish to be Dead No   2. Non-Specific Active Suicidal Thoughts  No   Activity other (see comment)  (Social with others in [facility])   Speech coherent;clear   Medication Sensitivity no stated side effects   Psychomotor / Gait balanced;steady   Psycho Education   Type of Intervention 1:1 intervention   Response participates, initiates socially appropriate   Hours 0.5   Activities of Daily Living   Hygiene/Grooming independent   Oral Hygiene independent   Dress scrubs (behavioral health)   Laundry with supervision   Room Organization independent   Activity   Activity Assistance Provided independent

## 2019-07-23 NOTE — PROGRESS NOTES
This writer spoke to pain clinic this afternoon and they will be coming to see Lima tomorrow. They will call in the morning before consult.

## 2019-07-24 PROCEDURE — 12400001 ZZH R&B MH UMMC

## 2019-07-24 PROCEDURE — G0177 OPPS/PHP; TRAIN & EDUC SERV: HCPCS

## 2019-07-24 PROCEDURE — 99221 1ST HOSP IP/OBS SF/LOW 40: CPT | Performed by: PHYSICIAN ASSISTANT

## 2019-07-24 PROCEDURE — 99207 ZZC CONSULT E&M CHANGED TO SUBSEQUENT LEVEL: CPT | Performed by: PHYSICIAN ASSISTANT

## 2019-07-24 PROCEDURE — H2032 ACTIVITY THERAPY, PER 15 MIN: HCPCS

## 2019-07-24 PROCEDURE — 25000132 ZZH RX MED GY IP 250 OP 250 PS 637: Performed by: PSYCHIATRY & NEUROLOGY

## 2019-07-24 PROCEDURE — 99233 SBSQ HOSP IP/OBS HIGH 50: CPT | Performed by: PSYCHIATRY & NEUROLOGY

## 2019-07-24 PROCEDURE — 25000132 ZZH RX MED GY IP 250 OP 250 PS 637: Performed by: PHYSICIAN ASSISTANT

## 2019-07-24 RX ORDER — OXYCODONE HYDROCHLORIDE 5 MG/1
5 TABLET ORAL 4 TIMES DAILY PRN
Status: DISPENSED | OUTPATIENT
Start: 2019-07-24 | End: 2019-07-25

## 2019-07-24 RX ORDER — OXYCODONE HYDROCHLORIDE 5 MG/1
5 TABLET ORAL 2 TIMES DAILY PRN
Status: DISPENSED | OUTPATIENT
Start: 2019-07-26 | End: 2019-07-26

## 2019-07-24 RX ORDER — LIDOCAINE 4 G/G
1-3 PATCH TOPICAL
Status: DISCONTINUED | OUTPATIENT
Start: 2019-07-24 | End: 2019-08-06 | Stop reason: HOSPADM

## 2019-07-24 RX ORDER — OXYCODONE HYDROCHLORIDE 5 MG/1
5 TABLET ORAL DAILY PRN
Status: DISPENSED | OUTPATIENT
Start: 2019-07-27 | End: 2019-07-27

## 2019-07-24 RX ORDER — OXYCODONE HYDROCHLORIDE 5 MG/1
5 TABLET ORAL 3 TIMES DAILY PRN
Status: DISPENSED | OUTPATIENT
Start: 2019-07-25 | End: 2019-07-26

## 2019-07-24 RX ADMIN — OLANZAPINE 10 MG: 10 TABLET, FILM COATED ORAL at 18:42

## 2019-07-24 RX ADMIN — LORAZEPAM 0.5 MG: 0.5 TABLET ORAL at 21:33

## 2019-07-24 RX ADMIN — NICOTINE POLACRILEX 4 MG: 4 GUM, CHEWING ORAL at 18:42

## 2019-07-24 RX ADMIN — ACAMPROSATE CALCIUM 666 MG: 333 TABLET, DELAYED RELEASE ORAL at 07:50

## 2019-07-24 RX ADMIN — LIDOCAINE 1 PATCH: 560 PATCH PERCUTANEOUS; TOPICAL; TRANSDERMAL at 21:34

## 2019-07-24 RX ADMIN — ACAMPROSATE CALCIUM 666 MG: 333 TABLET, DELAYED RELEASE ORAL at 21:33

## 2019-07-24 RX ADMIN — OXYCODONE HYDROCHLORIDE 5 MG: 5 TABLET ORAL at 21:35

## 2019-07-24 RX ADMIN — QUETIAPINE FUMARATE 25 MG: 25 TABLET ORAL at 23:28

## 2019-07-24 RX ADMIN — ACAMPROSATE CALCIUM 666 MG: 333 TABLET, DELAYED RELEASE ORAL at 13:12

## 2019-07-24 RX ADMIN — QUETIAPINE FUMARATE 25 MG: 25 TABLET ORAL at 12:05

## 2019-07-24 RX ADMIN — NICOTINE POLACRILEX 4 MG: 4 GUM, CHEWING ORAL at 13:13

## 2019-07-24 RX ADMIN — THIAMINE HCL TAB 100 MG 100 MG: 100 TAB at 07:50

## 2019-07-24 RX ADMIN — OXYCODONE HYDROCHLORIDE 5 MG: 5 TABLET ORAL at 06:29

## 2019-07-24 RX ADMIN — PROPRANOLOL HYDROCHLORIDE 10 MG: 10 TABLET ORAL at 07:50

## 2019-07-24 RX ADMIN — GABAPENTIN 800 MG: 400 CAPSULE ORAL at 07:50

## 2019-07-24 RX ADMIN — MULTIPLE VITAMINS W/ MINERALS TAB 1 TABLET: TAB at 07:50

## 2019-07-24 RX ADMIN — PROPRANOLOL HYDROCHLORIDE 10 MG: 10 TABLET ORAL at 21:35

## 2019-07-24 RX ADMIN — NICOTINE POLACRILEX 4 MG: 4 GUM, CHEWING ORAL at 07:50

## 2019-07-24 RX ADMIN — GABAPENTIN 800 MG: 400 CAPSULE ORAL at 13:12

## 2019-07-24 RX ADMIN — ESCITALOPRAM OXALATE 20 MG: 20 TABLET ORAL at 07:50

## 2019-07-24 RX ADMIN — NICOTINE POLACRILEX 4 MG: 4 GUM, CHEWING ORAL at 11:17

## 2019-07-24 RX ADMIN — NICOTINE POLACRILEX 4 MG: 4 GUM, CHEWING ORAL at 21:33

## 2019-07-24 RX ADMIN — DULOXETINE HYDROCHLORIDE 30 MG: 30 CAPSULE, DELAYED RELEASE ORAL at 07:49

## 2019-07-24 RX ADMIN — OXYCODONE HYDROCHLORIDE 5 MG: 5 TABLET ORAL at 13:12

## 2019-07-24 RX ADMIN — GABAPENTIN 1000 MG: 400 CAPSULE ORAL at 21:35

## 2019-07-24 RX ADMIN — FOLIC ACID 1 MG: 1 TABLET ORAL at 07:50

## 2019-07-24 RX ADMIN — OXYCODONE HYDROCHLORIDE 5 MG: 5 TABLET ORAL at 17:36

## 2019-07-24 RX ADMIN — FAMOTIDINE 20 MG: 20 TABLET, FILM COATED ORAL at 07:49

## 2019-07-24 RX ADMIN — FAMOTIDINE 20 MG: 20 TABLET, FILM COATED ORAL at 21:33

## 2019-07-24 RX ADMIN — OLANZAPINE 10 MG: 10 TABLET, FILM COATED ORAL at 07:50

## 2019-07-24 ASSESSMENT — ACTIVITIES OF DAILY LIVING (ADL)
ORAL_HYGIENE: INDEPENDENT
DRESS: INDEPENDENT;STREET CLOTHES
HYGIENE/GROOMING: INDEPENDENT

## 2019-07-24 NOTE — PROGRESS NOTES
Patient attended Psychoeducation group on addiction.  She was engaged the whole group, but remained quiet.

## 2019-07-24 NOTE — CONSULTS
"Inpatient Pain Management Service: Consultation      DATE OF CONSULT: 2019      REASON FOR PAIN CONSULTATION:  Lima Renteria is a 43 year old female I am seeing in consultation at the request of Noe Bahena MD for evaluation and recommendations for her RUQ pain.       CHIEF PAIN COMPLAINT: RUQ pain      ASSESSMENT:   1. RUQ pain-she states that this has been ongoing for approximately x 2 weeks when she restarted drinking alcohol.  She states that she has been drinking \"airplane shot bottles\" (vodka) 6x/day however she states that when she was admitted to [facility] on 19, she was taking about 3 of those airplane bottles.  She states that she has experienced this similar pain before when she drank alcohol and states that pain usually resolves within a month when abstaining from alcohol.     Abdominal CT scan done on 7/15/19 at [facility] showed: \" 1. Slight dilatation right intrarenal collecting system and ureter to the level of the bladder without obstructing stone or mass identified.  Relatively large capacity of the otherwise normal-appearing urinary bladder. 2. No urinary calculi identified. 3. Several small benign stable renal cysts. 4. Hepatic cirrhosis, steatosis and  recanalization of the diminutive umbilical vein. 5. Cholecystectomy and appendectomy.\"    Abdominal Ultrasound on 19 at [facility] showed no abdominal ascites.     Pt was advised to f/u outpatient with GI upon discharge at [facility].  2. Chronic arthalgias-  States that her CARLOS is usually elevated.  She is followed by rheumatology outpatient and is dx with arthralgias (did not mention any lupus diagnosis).   States that she was treated at [facility] for the joint pain and was taking percocet 5mg TID but states that she stopped going there in 2016 after  .  She states that she discontinued opioids on her own as she did not want to die like .  3. Alcohol abuse (reason for this " admission) with dx of alcohol hepatitis and underlying cirrhosis-she has a court date on 8/1/19 as her  petitioned for her to have involuntary commitment for alcohol abuse treatment program.  She is hoping to do treatment voluntarily rather than involuntary.   4. H/o suicide gesture on approximately 4/2/19-see note in care everywhere. Patient texted  that she would commit suicide by hanging with a dog leash.  5. Anxiety-now improved with initiation of zyprexa and seroquel by psychiatry on this admission.    -- Outpatient opioid requirements prior to admission: OME = none.     reviewed.      Primary Care Provider: [facility]  Chronic Pain Provider: none at this time.  Feels that joint pain is under control-previously treated until 2016 by [facility].    TREATMENT RECOMMENDATIONS/PLAN:   1. Today, change oxycodone 5mg PO 4x/day PRN for pain (at least 4 hours in between doses).  Max 4 tablets/day.    On 7/25/19: decrease oxycodone 5mg PO TID PRN (at least 4 hours in between doses).    On 7/26/19: decrease oxycodone 5mg PO BID PRN (at least 4 hours in between doses).    On 7/27/19: decrease oxycodone to 5mg PO daily PRN and STOP.    Do not recommend long term opioid management for patient which she expresses understanding.  Discussed risks and benefits especially cross addiction and high risk given her   alcohol abuse.  2. Increase lidocaine patch 4%, 1-3 patches TD Q 24 hours, 12 hours on and 12 hours off.  3. Start menthol patch, 1 patch TD Q 8 hours.  4. May consider increasing gabapentin to 800mg, 800mg, and 1200mg  (PTA on 800mg TID by outpatient psychiatry for mood, pain, decrease cravings per patient).  5. No role for NSAIDs (cause GI upset) or acetaminophen at this time due to underlying cirrhosis and alcohol abuse.  6. Continue bowel regimen to prevent opioid induced constipation.    Pain Service will Sign Off at this time.     Recommendations were discussed with  psychiatry team and pain team.  Plan was reviewed by the Pain Service consisting of Dr. Rajinder Matias    Thank you for consulting the Inpatient Pain Management Service.   The above recommendations are to be acted upon at the primary team s discretion.    To reach us:  Mon - Friday 8 AM - 3 PM: Pager [phone] (Text Page)  After hours, weekends and holidays: Primary service should call [phone] for the on-call pain specialist    HISTORY OF PRESENT ILLNESS: Lima Renteria is a 43 year old female with history of joint pain, anxiety, alcohol abuse s/p chem dep treatment programs at [facility], alcoholic cirrhosis who was originally admitted at [facility] from 7/12 to 7/17/19 for alcohol detoxification and then transferred to [facility] on 7/17/19 for further care.    Today, she is c/o RUQ pain which has been ongoing since she restarted drinking alcohol about 2 weeks which she knows causes this pain.  She had this pain in the past which resolves on its own in a month's time with abstinence of alcohol.  She states that the pain is a constant 7-8/10 level which feels like a knife stabbing and twisting.  Although she states that she does not have pain when she sleeps and was sleeping fairly well last night.  She states that the oxycodone 5mg seems to last 4 hours and not the 6 hours that is being prescribed.  She is asking for the dose to be changed to every 4 hours but max 4 tabs/day.  I discussed risks, benefits of opioids given her alcohol abuse and reason for this admission.  She tells me that she is aware of the risks and does not want to be on opioids long term.  However she is requesting to have some pain management for the next few days.  I recommend tapering off the oxycodone in a few days.      Of note, she was initially admitted for alcohol detoxification and abdominal pain at [facility] on 7/12-7/17/19 with imaging studies and work ups done with recommendation to f/u with GI outpatient.    PAIN HISTORY:   Location:  RUQ  Duration: constant   Pain intensity: 7-8/10 on VAS  Quality of the pain: knifelike.  Aggravating factors: random  Relieving factors : oxycodone    CAPA (Clinically Aligned Pain Assessment)  Comfort (How is your pain?): Tolerable with discomfort  Change in Pain (Since your last medication/intervention?): Getting better  Pain Control (How are your pain treatments working?): Partially effective pain control-not long lasting enough  Functioning (Are you able to do activities to get better?) : Can do most things, but pain gets in the way of some   Sleep (Does your pain management allow you to sleep or rest?): slept fairly well, only woke up once with pain      FUNCTIONAL STATUS:  Change:      unchanged  Oral intake:     Regular  Bowel function:    Normal. Last BM this morning.  Activity level:     Ambulating in cantu  Sleep:      No concerns, sleeps well through night  Mood:      adjusting to situation, calm and at peace today.      REVIEW OF 10 BODY SYSTEMS: 10 point ROS of systems including Constitutional, Eyes, Respiratory, Cardiovascular, Gastroenterology, Genitourinary, Integumentary, Musculoskeletal, Psychiatric were all negative except for pertinent positives noted in my HPI.       INPATIENT MEDICATIONS PERTINENT TO PAIN CONSULT:   Medications related to Pain Management (From now, onward)    Start     Dose/Rate Route Frequency Ordered Stop    07/22/19 1730  DULoxetine (CYMBALTA) DR capsule 30 mg      30 mg Oral DAILY 07/22/19 1728      07/21/19 1445  hydrOXYzine (ATARAX) tablet 50 mg      50 mg Oral EVERY 6 HOURS PRN 07/21/19 1433      07/20/19 1630  oxyCODONE (ROXICODONE) tablet 5 mg      5 mg Oral EVERY 6 HOURS PRN 07/20/19 1619      07/18/19 2100  LORazepam (ATIVAN) tablet 0.5 mg      0.5 mg Oral AT BEDTIME 07/18/19 1425      07/18/19 2000  gabapentin (NEURONTIN) capsule 800 mg      800 mg Oral 3 TIMES DAILY 07/18/19 1424      07/18/19 1315  Lidocaine (LIDOCARE) 4 % Patch 1-2 patch      1-2 patch  over 12  Hours Transdermal EVERY 24 HOURS 0800 07/18/19 1245      07/18/19 1300  lidocaine patch in PLACE       Transdermal EVERY 8 HOURS 07/18/19 1245      07/17/19 1625  magnesium hydroxide (MILK OF MAGNESIA) suspension 30 mL      30 mL Oral AT BEDTIME PRN 07/17/19 1626      07/17/19 1625  bisacodyl (DULCOLAX) Suppository 10 mg      10 mg Rectal DAILY PRN 07/17/19 1626              CURRENT MEDICATIONS:   Current Facility-Administered Medications Ordered in Epic   Medication Dose Route Frequency Provider Last Rate Last Dose     acamprosate (CAMPRAL) EC tablet 666 mg  666 mg Oral TID Noe Bahena MD   666 mg at 07/24/19 0750     alum & mag hydroxide-simethicone (MYLANTA ES/MAALOX  ES) suspension 30 mL  30 mL Oral Q4H PRN Noe Bahena MD         atenolol (TENORMIN) tablet 50 mg  50 mg Oral Daily PRN Noe Bahena MD         bisacodyl (DULCOLAX) Suppository 10 mg  10 mg Rectal Daily PRN Noe Bahena MD         DULoxetine (CYMBALTA) DR capsule 30 mg  30 mg Oral Daily Noe Bahena MD   30 mg at 07/24/19 0749     escitalopram (LEXAPRO) tablet 20 mg  20 mg Oral Daily Noe Bahena MD   20 mg at 07/24/19 0750     famotidine (PEPCID) tablet 20 mg  20 mg Oral BID Noe Bahena MD   20 mg at 07/24/19 0749     folic acid (FOLVITE) tablet 1 mg  1 mg Oral Daily Noe Bahena MD   1 mg at 07/24/19 0750     gabapentin (NEURONTIN) capsule 800 mg  800 mg Oral TID Noe Bahena MD   800 mg at 07/24/19 0750     hydrOXYzine (ATARAX) tablet 50 mg  50 mg Oral Q6H PRN Faizan East APRN CNP   50 mg at 07/23/19 1426     Lidocaine (LIDOCARE) 4 % Patch 1-2 patch  1-2 patch Transdermal Q24H Yue Romo PA-C   1 patch at 07/23/19 2025     lidocaine patch in PLACE   Transdermal Q8H Yue Romo PA-C         lidocaine patch REMOVAL   Transdermal Q24h Yue Romo PA-C         LORazepam (ATIVAN) tablet 0.5 mg  0.5 mg Oral At Bedtime  Noe Bahena MD   0.5 mg at 07/23/19 2153     magnesium hydroxide (MILK OF MAGNESIA) suspension 30 mL  30 mL Oral At Bedtime PRN Noe Bahena MD         menthol (Topical Analgesic) 2.5% (BENGAY VANISHIN SCENT) 2.5 % topical gel   Topical Q6H PRN Yue Romo PA-C         multivitamin w/minerals (THERA-VIT-M) tablet 1 tablet  1 tablet Oral Daily Noe Bahena MD   1 tablet at 07/24/19 0750     naloxone (NARCAN) injection 0.1-0.4 mg  0.1-0.4 mg Intravenous Q2 Min PRN Neo Bahena MD         nicotine polacrilex (NICORETTE) gum 4 mg  4 mg Buccal Q1H PRN Noe Bahena MD   4 mg at 07/24/19 0750     OLANZapine (zyPREXA) tablet 10 mg  10 mg Oral Q2H PRN Noe Bahena MD   10 mg at 07/24/19 0750    Or     OLANZapine (zyPREXA) injection 10 mg  10 mg Intramuscular Q2H PRN Noe Bahena MD         ondansetron (ZOFRAN) tablet 4 mg  4 mg Oral Q6H PRN Noe Bahena MD         oxyCODONE (ROXICODONE) tablet 5 mg  5 mg Oral Q6H PRN Yue Romo PA-C   5 mg at 07/24/19 0629     propranolol (INDERAL) tablet 10 mg  10 mg Oral BID Yue Romo PA-C   10 mg at 07/24/19 0750     QUEtiapine (SEROquel) tablet 25 mg  25 mg Oral TID PRN Noe Bahena MD   25 mg at 07/23/19 1910     traZODone (DESYREL) tablet 50 mg  50 mg Oral At Bedtime PRN Noe Bahena MD   50 mg at 07/23/19 2153     vitamin B1 (THIAMINE) tablet 100 mg  100 mg Oral Daily Noe Bahena MD   100 mg at 07/24/19 0750     No current Epic-ordered outpatient medications on file.           HOME/PREVIOUS MEDICATIONS:   Prior to Admission medications    Medication Sig Start Date End Date Taking? Authorizing Provider   acamprosate (CAMPRAL) 333 MG EC tablet Take 666 mg by mouth 3 times daily   Yes Unknown, Entered By History   diazepam (VALIUM) 2 MG tablet Take 1 mg by mouth 2 times daily   Yes Unknown, Entered By History   escitalopram (LEXAPRO) 20 MG tablet Take 20  mg by mouth daily   Yes Unknown, Entered By History   famotidine (PEPCID) 20 MG tablet Take 20 mg by mouth 2 times daily   Yes Unknown, Entered By History   gabapentin (NEURONTIN) 800 MG tablet Take 800 mg by mouth 3 times daily   Yes Unknown, Entered By History   hydrOXYzine (ATARAX) 25 MG tablet Take 25-50 mg by mouth 3 times daily as needed for itching   Yes Unknown, Entered By History   ondansetron (ZOFRAN) 4 MG tablet Take by mouth every 6 hours as needed for nausea   Yes Unknown, Entered By History   oxyCODONE (ROXICODONE) 5 MG tablet Take 5 mg by mouth every 4 hours as needed for severe pain    Yes Unknown, Entered By History   propranolol (INDERAL) 10 MG tablet Take 10 mg by mouth 2 times daily   Yes Unknown, Entered By History   IBUPROFEN PO Take 600 mg by mouth every 6 hours as needed for moderate pain    Reported, Patient           PAST PAIN TREATMENT:   Chronic opioid management at [facility]-discontinued by patient in 2016.  YOGA  Acupuncture-ineffective      ALLERGIES:    Allergies   Allergen Reactions     Cranberry Extract Hives     Morphine Other (See Comments)     Vomiting     Penicillins Unknown     Sertraline Other (See Comments)     dreams     Unasyn      Celecoxib Rash     Sulfa Drugs Rash            PAST MEDICAL AND PSYCHIATRIC HISTORY:  No past medical history on file.        PAST SURGICAL HISTORY: No past surgical history on file.        FAMILY HISTORY: family history is not on file.      HEALTH & LIFESTYLE PRACTICES:   Tobacco:  has no tobacco history on file.  Alcohol:  has no alcohol history on file.  Illicit drugs:  has no drug history on file.      SOCIAL HISTORY:   Social History     Socioeconomic History     Marital status:      Spouse name: Not on file     Number of children: Not on file     Years of education: Not on file     Highest education level: Not on file   Occupational History     Not on file   Social Needs     Financial resource strain: Not on file     Food  insecurity:     Worry: Not on file     Inability: Not on file     Transportation needs:     Medical: Not on file     Non-medical: Not on file   Tobacco Use     Smoking status: Not on file   Substance and Sexual Activity     Alcohol use: Not on file     Drug use: Not on file     Sexual activity: Not on file   Lifestyle     Physical activity:     Days per week: Not on file     Minutes per session: Not on file     Stress: Not on file   Relationships     Social connections:     Talks on phone: Not on file     Gets together: Not on file     Attends Gnosticism service: Not on file     Active member of club or organization: Not on file     Attends meetings of clubs or organizations: Not on file     Relationship status: Not on file     Intimate partner violence:     Fear of current or ex partner: Not on file     Emotionally abused: Not on file     Physically abused: Not on file     Forced sexual activity: Not on file   Other Topics Concern     Not on file   Social History Narrative     Not on file         LABORATORY VALUES:   Last Basic Metabolic Panel:  Lab Results   Component Value Date     07/21/2019      Lab Results   Component Value Date    POTASSIUM 3.8 07/21/2019     Lab Results   Component Value Date    CHLORIDE 109 07/21/2019     Lab Results   Component Value Date    ALEXANDRO 8.4 07/21/2019     Lab Results   Component Value Date    CO2 29 07/21/2019     Lab Results   Component Value Date    BUN 8 07/21/2019     Lab Results   Component Value Date    CR 0.60 07/21/2019     Lab Results   Component Value Date     07/21/2019       CBC results:  Lab Results   Component Value Date    WBC 3.8 07/21/2019     Lab Results   Component Value Date    HGB 11.1 07/21/2019     Lab Results   Component Value Date    HCT 34.5 07/21/2019     Lab Results   Component Value Date     07/21/2019       DIAGNOSTIC TESTS:       Labs above reviewed as well as additional relevant diagnostic studies from the EPIC record.        PHYSICAL EXAMINATION:  VITAL SIGNS:  B/P: 108/76, T: 98.4, P: 84, R: 16    CONSTITUTIONAL/GENERAL APPEARANCE: AAOx3. Conversant.  EYES: EOMI, sclerae clear  ENT/NECK: neck is supple  RESPIRATORY: CTA, non labored breathing.  CARDIOVASCULAR: S1 and S2 appreciated  GI:soft, nontender, bowel sounds present.  No guarding.  MUSCULOSKELETAL/BACK/SPINE/EXTREMITIES: Moving UE and LE spontaneously.  Spine-nontender to palpation.     NEURO:  SILT to UE and LE.  SKIN/VASCULAR EXAM:  Dry and warm.  PSYCHIATRIC/BEHAVIORAL/OBSERVATIONS:  No objective signs of pain observed during our interview.   Judgment/Insight -fair   Orientation - x3   Memory -good   Mood and affect - calm, pleasant, cooperative          TIME SPENT: 60 minutes was spent  including greater than 50% (at least 30 minutes) was spent face-to-face time counseling her  about her pain management treatment options, and coordinating care with the primary team.    Elsa Silva PA-C  July 24, 2019, 10:48 AM  Inpatient Pain Management Service

## 2019-07-24 NOTE — PROGRESS NOTES
[facility], [place]   Psychiatric Progress Note        Interim History:     The patient's care was discussed with the treatment team during the daily team meeting and/or staff's chart notes were reviewed.  Staff report patient spent more time outside of her room. She appeared to be more animated, reported feeling more comfortable in terms of her abdominal pain. She met with pain medicine who recommended to use more menthol patches and lidocaine patches, increase Gabapentin and start tapering off Roxicodone, see pain medicine note, I had brief phone conversation with pain medicine PA, discussed suggested treatment options. I appreciate pain medicine help. Patient met with . Her final hearing will be on August 1. She, during visit with me stated that she was more open to going to residential CD treatment and appreciated help of this provider and all our staff. She denied Suicidal ideation, had full range of affect.          Medications:       acamprosate  666 mg Oral TID     DULoxetine  30 mg Oral Daily     escitalopram  20 mg Oral Daily     famotidine  20 mg Oral BID     folic acid  1 mg Oral Daily     gabapentin  1,000 mg Oral TID     lidocaine  1-3 patch Transdermal Q24H     lidocaine   Transdermal Q8H     lidocaine   Transdermal Q24h     LORazepam  0.5 mg Oral At Bedtime     menthol   Transdermal Q8H     multivitamin w/minerals  1 tablet Oral Daily     propranolol  10 mg Oral BID     vitamin B1  100 mg Oral Daily          Allergies:     Allergies   Allergen Reactions     Cranberry Extract Hives     Morphine Other (See Comments)     Vomiting     Penicillins Unknown     Sertraline Other (See Comments)     dreams     Unasyn      Celecoxib Rash     Sulfa Drugs Rash          Labs:     No results found for this or any previous visit (from the past 24 hour(s)).       Psychiatric Examination:     /76   Pulse 84   Temp 98.4  F (36.9  C) (Oral)   Resp 16   Ht 1.575 m (5'  "2\")   Wt 70.4 kg (155 lb 1.6 oz)   SpO2 94%   BMI 28.37 kg/m    Weight is 155 lbs 1.6 oz  Body mass index is 28.37 kg/m .    Orthostatic Vitals       Most Recent      Sitting Orthostatic /88 07/23 0755    Sitting Orthostatic Pulse (bpm) 65 07/23 0755    Standing Orthostatic /74 07/23 0755    Standing Orthostatic Pulse (bpm) 102 07/23 0755         Appearance: awake, alert and dressed in hospital scrubs  Attitude: more cooperative  Eye Contact:  fair  Mood: less anxious and depressed  Affect:  more animated today  Speech:  decreased prosody  Psychomotor Behavior: no evidence of dystonia and intact station, gait and muscle tone  Throught Process:  goal oriented  Associations:  no loose associations  Thought Content:  no evidence of suicidal ideation or homicidal ideation  Insight:  limited, but improving   Judgement:  limited, but improving   Oriented to:  time, person, and place  Attention Span and Concentration:  fair  Recent and Remote Memory:  fair    Clinical Global Impressions  First: 7/4 7/18/2019      Most recent: 4/3 7/24/2019             Precautions:     Behavioral Orders   Procedures     Code 1 - Restrict to Unit     Elopement precautions     Routine Programming     As clinically indicated     Status 15     Every 15 minutes.     Suicide precautions     Patients on Suicide Precautions should have a Combination Diet ordered that includes a Diet selection(s) AND a Behavioral Tray selection for Safe Tray - with utensils, or Safe Tray - NO utensils       Withdrawal precautions          DIagnoses:     1.  Major depressive disorder, recurrent, moderate severity.   2.  Generalized anxiety disorder.   3.  History of panic disorder with agoraphobia.   4.  Alcohol use disorder, severe, with dependence.     5.  Recent alcohol withdrawal symptoms.     6.  Liver cirrhosis.          Plan:   Will as per pain medicine consult increase Gabapentin, change Lidocaine patches from 1-2 to 1-3 prn for pain and, also, " order menthol patches. Will start tapering off Roxicodone removing one tablet per day. (Most common side effects were discussed with the patient to the patient's satisfaction.). Will continue to provide support and structure. She is on a court hold and will continue with commitment.

## 2019-07-24 NOTE — PROGRESS NOTES
Pt visible in milieu, participating in groups, social with peers.  Pt observed coloring in the lounge and watching tv.  Pleasant upon approach/with requests.  Ate meals.  No hallucinations, SI/SIB or medication side effects observed.       07/24/19 1400   Behavioral Health   Hallucinations denies / not responding to hallucinations   Orientation person: oriented   Memory baseline memory   Eye Contact at examiner   Affect full range affect   Physical Appearance/Attire attire appropriate to age and situation   Hygiene well groomed   Suicidality other (see comments)  (none observed)   1. Wish to be Dead No   2. Non-Specific Active Suicidal Thoughts  No   Self Injury other (see comment)  (none observed)   Activity other (see comment)  (present in milieu)   Speech clear;coherent   Medication Sensitivity no observed side effects   Psychomotor / Gait balanced;steady   Psycho Education   Type of Intervention 1:1 intervention   Response observes from a distance   Activities of Daily Living   Hygiene/Grooming independent   Oral Hygiene independent   Dress independent;street clothes   Room Organization independent

## 2019-07-24 NOTE — PROGRESS NOTES
Dr CRAFT was paged to remind him to address pain tx recommendations made by pain specialist (PA), today.

## 2019-07-24 NOTE — PLAN OF CARE
Patient was visible out in the milieu, selectively sociable with peers, uses Yoga with deep breathing as a form of coping with anxiety and stress in general, stated her goal is to learn to be assertive and say no without feeling guilt, denies SI/SIB, endorses depression of 5/10 and anxiety of 8/10, requested Seroquel 25mg around 1910 to help with anxiety, confrontation she states triggers her anxiety and is currently working on learning how to have healthy ways to approach confrontations, no other concerns noted, ate dinner well, appeared calm and had a brighter affect today during shift.

## 2019-07-25 PROCEDURE — G0177 OPPS/PHP; TRAIN & EDUC SERV: HCPCS

## 2019-07-25 PROCEDURE — 99207 ZZC NO CHARGE SIGN-OFF PS: CPT

## 2019-07-25 PROCEDURE — 12400001 ZZH R&B MH UMMC

## 2019-07-25 PROCEDURE — 99232 SBSQ HOSP IP/OBS MODERATE 35: CPT | Performed by: PSYCHIATRY & NEUROLOGY

## 2019-07-25 PROCEDURE — 25000132 ZZH RX MED GY IP 250 OP 250 PS 637: Performed by: PSYCHIATRY & NEUROLOGY

## 2019-07-25 PROCEDURE — 25000132 ZZH RX MED GY IP 250 OP 250 PS 637: Performed by: PHYSICIAN ASSISTANT

## 2019-07-25 RX ORDER — LORAZEPAM 0.5 MG/1
0.25 TABLET ORAL AT BEDTIME
Status: COMPLETED | OUTPATIENT
Start: 2019-07-25 | End: 2019-07-26

## 2019-07-25 RX ADMIN — MENTHOL 1 PATCH: 205.5 PATCH TOPICAL at 15:17

## 2019-07-25 RX ADMIN — PROPRANOLOL HYDROCHLORIDE 10 MG: 10 TABLET ORAL at 22:15

## 2019-07-25 RX ADMIN — OXYCODONE HYDROCHLORIDE 5 MG: 5 TABLET ORAL at 15:17

## 2019-07-25 RX ADMIN — TRAZODONE HYDROCHLORIDE 50 MG: 50 TABLET ORAL at 22:15

## 2019-07-25 RX ADMIN — QUETIAPINE FUMARATE 25 MG: 25 TABLET ORAL at 12:39

## 2019-07-25 RX ADMIN — ACAMPROSATE CALCIUM 666 MG: 333 TABLET, DELAYED RELEASE ORAL at 15:17

## 2019-07-25 RX ADMIN — OXYCODONE HYDROCHLORIDE 5 MG: 5 TABLET ORAL at 19:56

## 2019-07-25 RX ADMIN — Medication 0.25 MG: at 22:15

## 2019-07-25 RX ADMIN — NICOTINE POLACRILEX 4 MG: 4 GUM, CHEWING ORAL at 12:18

## 2019-07-25 RX ADMIN — DULOXETINE HYDROCHLORIDE 30 MG: 30 CAPSULE, DELAYED RELEASE ORAL at 10:10

## 2019-07-25 RX ADMIN — FOLIC ACID 1 MG: 1 TABLET ORAL at 10:10

## 2019-07-25 RX ADMIN — FAMOTIDINE 20 MG: 20 TABLET, FILM COATED ORAL at 22:15

## 2019-07-25 RX ADMIN — NICOTINE POLACRILEX 4 MG: 4 GUM, CHEWING ORAL at 16:36

## 2019-07-25 RX ADMIN — ESCITALOPRAM OXALATE 20 MG: 20 TABLET ORAL at 10:09

## 2019-07-25 RX ADMIN — NICOTINE POLACRILEX 4 MG: 4 GUM, CHEWING ORAL at 19:56

## 2019-07-25 RX ADMIN — ACAMPROSATE CALCIUM 666 MG: 333 TABLET, DELAYED RELEASE ORAL at 10:09

## 2019-07-25 RX ADMIN — OLANZAPINE 10 MG: 10 TABLET, FILM COATED ORAL at 10:17

## 2019-07-25 RX ADMIN — MULTIPLE VITAMINS W/ MINERALS TAB 1 TABLET: TAB at 10:10

## 2019-07-25 RX ADMIN — FAMOTIDINE 20 MG: 20 TABLET, FILM COATED ORAL at 10:10

## 2019-07-25 RX ADMIN — GABAPENTIN 1000 MG: 400 CAPSULE ORAL at 15:16

## 2019-07-25 RX ADMIN — GABAPENTIN 1000 MG: 400 CAPSULE ORAL at 10:10

## 2019-07-25 RX ADMIN — OLANZAPINE 10 MG: 10 TABLET, FILM COATED ORAL at 15:17

## 2019-07-25 RX ADMIN — THIAMINE HCL TAB 100 MG 100 MG: 100 TAB at 10:10

## 2019-07-25 RX ADMIN — ACAMPROSATE CALCIUM 666 MG: 333 TABLET, DELAYED RELEASE ORAL at 22:14

## 2019-07-25 RX ADMIN — OXYCODONE HYDROCHLORIDE 5 MG: 5 TABLET ORAL at 10:18

## 2019-07-25 RX ADMIN — GABAPENTIN 1000 MG: 400 CAPSULE ORAL at 22:14

## 2019-07-25 RX ADMIN — LIDOCAINE 1 PATCH: 560 PATCH PERCUTANEOUS; TOPICAL; TRANSDERMAL at 22:15

## 2019-07-25 RX ADMIN — NICOTINE POLACRILEX 4 MG: 4 GUM, CHEWING ORAL at 22:14

## 2019-07-25 ASSESSMENT — MIFFLIN-ST. JEOR: SCORE: 1324.49

## 2019-07-25 ASSESSMENT — ACTIVITIES OF DAILY LIVING (ADL)
HYGIENE/GROOMING: INDEPENDENT
ORAL_HYGIENE: INDEPENDENT
DRESS: INDEPENDENT

## 2019-07-25 NOTE — PROGRESS NOTES
"   07/25/19 1429   Behavioral Health   Hallucinations denies / not responding to hallucinations   Thinking distractable   Orientation place: oriented;date: oriented   Memory baseline memory   Insight insight appropriate to situation   Judgement impaired   Eye Contact at examiner   Affect full range affect   Mood anxious   Physical Appearance/Attire attire appropriate to age and situation   Hygiene well groomed   Suicidality other (see comments)  (denies)   1. Wish to be Dead No   2. Non-Specific Active Suicidal Thoughts  No   Self Injury other (see comment)  (None observed or reported )   Elopement   (none observed )   Activity other (see comment)  (visible on unit)   Speech coherent;clear     Pt has been up and visible on the milieu. Affect is bright. Appearance is well groomed. Pt was observed socializing with peers, worked on coloring, and attended groups. Her goal is to, \"have a positive day.\" Denies thoughts to hurt herself or others. Contracts for safety.   "

## 2019-07-25 NOTE — PROGRESS NOTES
" 07/24/19 1300   Art Therapy   Type of Intervention structured groups   Response participates with encouragement   Hours 1   Treatment Detail Art Therapy-\" I am\" self esteem   Problem-1.  Major depressive disorder, recurrent, moderate severity.   2.  Generalized anxiety disorder.   3.  History of panic disorder with agoraphobia.   4.  Alcohol use disorder, severe, with dependence.     5.  Recent alcohol withdrawal symptoms.     6.  Liver cirrhosis.     Goal- cope, express, regulate through Art Therapy directives     Outcome- pt was engaged for the full hour. Pt had a good group.  She was working on a large coloring sheet. She enjoyed the social aspect of group. She was open about addiction, family mental health issues and self esteem issues. important to him. At times she seemed to get stuck with her own opinion and difficult listening to other perspectives, otherwise cooperative , social and pleasant. She enjoyed her peers in this small group.        "

## 2019-07-25 NOTE — PLAN OF CARE
BEHAVIORAL TEAM DISCUSSION    Participants: Dr. Bahena, Arabella Hairston (CTC), Genny Weldon (OT), Cierra (RN)  Progress: Some progress; staff report that the patient participates in groups and is sometimes social in the milieu.   Continued Stay Criteria/Rationale: Continued stabilization and completion of the commitment process.   Medical/Physical: Liver Cirrhosis.    Precautions:   Behavioral Orders   Procedures     Code 1 - Restrict to Unit     Elopement precautions     Routine Programming     As clinically indicated     Status 15     Every 15 minutes.     Suicide precautions     Patients on Suicide Precautions should have a Combination Diet ordered that includes a Diet selection(s) AND a Behavioral Tray selection for Safe Tray - with utensils, or Safe Tray - NO utensils       Withdrawal precautions     Plan: The patient will have an examination through [facility] on Tuesday, July 30th and a trial on Thursday, August 1st. Upon completion of the commitment process, the patient will be assessed for discharge.     Rationale for change in precautions or plan: None.

## 2019-07-25 NOTE — PROGRESS NOTES
"Pt began the session reluctant to move because she already practices yoga three time per day.  She shared that over the past year, this has reduced her pain level and increased her range of mobility.  She sat with legs crossed on her chair requesting more \"meditative\" interventions.  \"Energy\" was important to her and she expressed having an energetic connection to a peer in the group; their rapport was noticeable.           07/25/19 1015   Dance Movement Therapy   Type of Intervention structured groups   Response participates with encouragement   Hours 1     "

## 2019-07-25 NOTE — PLAN OF CARE
"Pt was visible in the milieu. Pt was socializing with others at the lounge and coloring. Full range affect. Denies SI/SIB/AH/VH. Pt endorses anxiety \"5\" and depression \"3\". Pt goal for the day was to attend all the groups and she did. Pt's  visited this evening and visit went well. Pt reported pain and prn Oxycodone was administered and was effective.  "

## 2019-07-25 NOTE — PROGRESS NOTES
"[facility], [place]   Psychiatric Progress Note        Interim History:     The patient's care was discussed with the treatment team during the daily team meeting and/or staff's chart notes were reviewed.  Staff report patient spent more time outside of her room. She is very social and goes to groups. Subjectively, appears to be in a very good mood. She appeared to be more animated, reported feeling more comfortable in terms of her abdominal pain. She also agreed today to decrease and, then, discontinue Lorazepam. Her final hearing will be on August 1. She, during visit with me stated that she was more open to going to residential CD treatment and appreciated help of this provider and all our staff. She denied Suicidal ideation, had full range of affect.          Medications:       acamprosate  666 mg Oral TID     DULoxetine  30 mg Oral Daily     escitalopram  20 mg Oral Daily     famotidine  20 mg Oral BID     folic acid  1 mg Oral Daily     gabapentin  1,000 mg Oral TID     lidocaine  1-3 patch Transdermal Q24H     lidocaine   Transdermal Q8H     lidocaine   Transdermal Q24h     LORazepam  0.25 mg Oral At Bedtime     menthol   Transdermal Q8H     multivitamin w/minerals  1 tablet Oral Daily     propranolol  10 mg Oral BID     vitamin B1  100 mg Oral Daily          Allergies:     Allergies   Allergen Reactions     Cranberry Extract Hives     Morphine Other (See Comments)     Vomiting     Penicillins Unknown     Sertraline Other (See Comments)     dreams     Unasyn      Celecoxib Rash     Sulfa Drugs Rash          Labs:     No results found for this or any previous visit (from the past 24 hour(s)).       Psychiatric Examination:     /70   Pulse 82   Temp 98.6  F (37  C) (Oral)   Resp 17   Ht 1.575 m (5' 2\")   Wt 71.1 kg (156 lb 12.8 oz)   SpO2 96%   BMI 28.68 kg/m    Weight is 156 lbs 12.8 oz  Body mass index is 28.68 kg/m .    Orthostatic Vitals       Most Recent      " Sitting Orthostatic /70 07/25 1004    Sitting Orthostatic Pulse (bpm) 82 07/25 1004    Standing Orthostatic BP 89/59 07/25 1004    Standing Orthostatic Pulse (bpm) 82 07/25 1004         Appearance: awake, alert and dressed in hospital scrubs  Attitude: more cooperative  Eye Contact:  fair  Mood: better - less anxious and depressed  Affect:  more animated today  Speech:  WNL  Psychomotor Behavior: no evidence of dystonia and intact station, gait and muscle tone  Throught Process:  goal oriented  Associations:  no loose associations  Thought Content:  no evidence of suicidal ideation or homicidal ideation  Insight:  limited, but improving   Judgement:  limited, but improving   Oriented to:  time, person, and place  Attention Span and Concentration:  fair  Recent and Remote Memory:  fair    Clinical Global Impressions  First: 7/4 7/18/2019      Most recent: 4/3 7/24/2019             Precautions:     Behavioral Orders   Procedures     Code 1 - Restrict to Unit     Elopement precautions     Routine Programming     As clinically indicated     Status 15     Every 15 minutes.     Suicide precautions     Patients on Suicide Precautions should have a Combination Diet ordered that includes a Diet selection(s) AND a Behavioral Tray selection for Safe Tray - with utensils, or Safe Tray - NO utensils       Withdrawal precautions          DIagnoses:     1.  Major depressive disorder, recurrent, moderate severity.   2.  Generalized anxiety disorder.   3.  History of panic disorder with agoraphobia.   4.  Alcohol use disorder, severe, with dependence.     5.  Recent alcohol withdrawal symptoms.     6.  Liver cirrhosis.          Plan:   Will order CD consult and as per discussion above start tapering off Lorazepam. Will also continue to taper off opiates. Will continue to provide support and structure. She is on a court hold and will continue with commitment.

## 2019-07-25 NOTE — PROGRESS NOTES
07/25/19 1500   Occupational Therapy   Type of Intervention structured groups   Response Initiates, socially acceptable   Hours 2       Sensory integration-based group. Education was provided on the sensory system, under and over- responsiveness, and use of sensory strategies for self regulation within daily activities. Pt Response: Discussed personal sensory preferences with the group and trailed various modalities to achieve a regulating response. Was observed with a calming response to visual and tactile stimuli. Expressed interest in a sound machine, aromatherapy and thera-putty for use on the unit.      Occupational therapy clinic. Pt Response: Demonstrated consistent engagement and performance skills as observed on previous dates.     Found significant enjoyment in talking to length about herself, regarding any topic, to peers and writer throughout groups.

## 2019-07-26 LAB
ALBUMIN SERPL-MCNC: 3.3 G/DL (ref 3.4–5)
ALP SERPL-CCNC: 191 U/L (ref 40–150)
ALT SERPL W P-5'-P-CCNC: 153 U/L (ref 0–50)
AST SERPL W P-5'-P-CCNC: 55 U/L (ref 0–45)
BILIRUB DIRECT SERPL-MCNC: 0.1 MG/DL (ref 0–0.2)
BILIRUB SERPL-MCNC: 0.3 MG/DL (ref 0.2–1.3)
PROT SERPL-MCNC: 7.5 G/DL (ref 6.8–8.8)

## 2019-07-26 PROCEDURE — 25000132 ZZH RX MED GY IP 250 OP 250 PS 637: Performed by: PHYSICIAN ASSISTANT

## 2019-07-26 PROCEDURE — 99232 SBSQ HOSP IP/OBS MODERATE 35: CPT | Performed by: PSYCHIATRY & NEUROLOGY

## 2019-07-26 PROCEDURE — 36415 COLL VENOUS BLD VENIPUNCTURE: CPT | Performed by: PHYSICIAN ASSISTANT

## 2019-07-26 PROCEDURE — 80076 HEPATIC FUNCTION PANEL: CPT | Performed by: PHYSICIAN ASSISTANT

## 2019-07-26 PROCEDURE — 12400001 ZZH R&B MH UMMC

## 2019-07-26 PROCEDURE — H2032 ACTIVITY THERAPY, PER 15 MIN: HCPCS

## 2019-07-26 PROCEDURE — 25000132 ZZH RX MED GY IP 250 OP 250 PS 637: Performed by: PSYCHIATRY & NEUROLOGY

## 2019-07-26 RX ADMIN — NICOTINE POLACRILEX 4 MG: 4 GUM, CHEWING ORAL at 14:05

## 2019-07-26 RX ADMIN — QUETIAPINE FUMARATE 25 MG: 25 TABLET ORAL at 18:37

## 2019-07-26 RX ADMIN — FOLIC ACID 1 MG: 1 TABLET ORAL at 09:25

## 2019-07-26 RX ADMIN — GABAPENTIN 1000 MG: 400 CAPSULE ORAL at 09:25

## 2019-07-26 RX ADMIN — GABAPENTIN 1000 MG: 400 CAPSULE ORAL at 14:05

## 2019-07-26 RX ADMIN — NICOTINE POLACRILEX 4 MG: 4 GUM, CHEWING ORAL at 07:31

## 2019-07-26 RX ADMIN — Medication 0.25 MG: at 22:15

## 2019-07-26 RX ADMIN — ACAMPROSATE CALCIUM 666 MG: 333 TABLET, DELAYED RELEASE ORAL at 22:14

## 2019-07-26 RX ADMIN — DULOXETINE HYDROCHLORIDE 30 MG: 30 CAPSULE, DELAYED RELEASE ORAL at 09:24

## 2019-07-26 RX ADMIN — NICOTINE POLACRILEX 4 MG: 4 GUM, CHEWING ORAL at 19:27

## 2019-07-26 RX ADMIN — OLANZAPINE 10 MG: 10 TABLET, FILM COATED ORAL at 07:31

## 2019-07-26 RX ADMIN — GABAPENTIN 1000 MG: 400 CAPSULE ORAL at 22:14

## 2019-07-26 RX ADMIN — NICOTINE POLACRILEX 4 MG: 4 GUM, CHEWING ORAL at 22:15

## 2019-07-26 RX ADMIN — OLANZAPINE 10 MG: 10 TABLET, FILM COATED ORAL at 14:05

## 2019-07-26 RX ADMIN — OXYCODONE HYDROCHLORIDE 5 MG: 5 TABLET ORAL at 19:27

## 2019-07-26 RX ADMIN — MULTIPLE VITAMINS W/ MINERALS TAB 1 TABLET: TAB at 09:25

## 2019-07-26 RX ADMIN — TRAZODONE HYDROCHLORIDE 50 MG: 50 TABLET ORAL at 22:14

## 2019-07-26 RX ADMIN — THIAMINE HCL TAB 100 MG 100 MG: 100 TAB at 09:25

## 2019-07-26 RX ADMIN — LIDOCAINE 1 PATCH: 560 PATCH PERCUTANEOUS; TOPICAL; TRANSDERMAL at 22:15

## 2019-07-26 RX ADMIN — MENTHOL 1 PATCH: 205.5 PATCH TOPICAL at 11:00

## 2019-07-26 RX ADMIN — FAMOTIDINE 20 MG: 20 TABLET, FILM COATED ORAL at 09:25

## 2019-07-26 RX ADMIN — ESCITALOPRAM OXALATE 20 MG: 20 TABLET ORAL at 09:24

## 2019-07-26 RX ADMIN — PROPRANOLOL HYDROCHLORIDE 10 MG: 10 TABLET ORAL at 09:25

## 2019-07-26 RX ADMIN — PROPRANOLOL HYDROCHLORIDE 10 MG: 10 TABLET ORAL at 22:14

## 2019-07-26 RX ADMIN — QUETIAPINE FUMARATE 25 MG: 25 TABLET ORAL at 10:59

## 2019-07-26 RX ADMIN — MENTHOL 1 PATCH: 205.5 PATCH TOPICAL at 09:27

## 2019-07-26 RX ADMIN — ACAMPROSATE CALCIUM 666 MG: 333 TABLET, DELAYED RELEASE ORAL at 09:24

## 2019-07-26 RX ADMIN — FAMOTIDINE 20 MG: 20 TABLET, FILM COATED ORAL at 22:14

## 2019-07-26 RX ADMIN — OXYCODONE HYDROCHLORIDE 5 MG: 5 TABLET ORAL at 07:31

## 2019-07-26 RX ADMIN — NICOTINE POLACRILEX 4 MG: 4 GUM, CHEWING ORAL at 09:36

## 2019-07-26 RX ADMIN — ACAMPROSATE CALCIUM 666 MG: 333 TABLET, DELAYED RELEASE ORAL at 14:05

## 2019-07-26 ASSESSMENT — ACTIVITIES OF DAILY LIVING (ADL)
LAUNDRY: WITH SUPERVISION
HYGIENE/GROOMING: INDEPENDENT
ORAL_HYGIENE: INDEPENDENT
LAUNDRY: WITH SUPERVISION
ORAL_HYGIENE: INDEPENDENT
DRESS: STREET CLOTHES
HYGIENE/GROOMING: INDEPENDENT
DRESS: STREET CLOTHES;SCRUBS (BEHAVIORAL HEALTH)

## 2019-07-26 NOTE — PROGRESS NOTES
Pt was calm and cooperative with blunted mood affect. Pt was visibile in common areas for the majority part of the shift socializing and watching tv. Pt endorsed moderate anxiety 5/10 and low depression 2/10, denied any other mental health symptoms. Pt is hoping to discharge near the future. Pt's appetite was good and sleeping/napping well. No behavioral issues.          07/26/19 1308   Behavioral Health   Hallucinations denies / not responding to hallucinations   Thinking poor concentration   Orientation time: oriented;date: oriented;place: oriented;person: oriented   Memory baseline memory   Insight insight appropriate to situation   Judgement impaired   Eye Contact at examiner   Affect blunted, flat   Mood mood is calm;anxious;depressed   Physical Appearance/Attire attire appropriate to age and situation   Hygiene neglected grooming - unclean body, hair, teeth   Suicidality   (denied )   1. Wish to be Dead No   2. Non-Specific Active Suicidal Thoughts  No   Self Injury   (denied )   Elopement   (denied )   Speech coherent;clear   Medication Sensitivity no observed side effects   Psychomotor / Gait balanced;steady   Psycho Education   Type of Intervention 1:1 intervention   Response participates, initiates socially appropriate   Hours 0.5   Activities of Daily Living   Hygiene/Grooming independent   Oral Hygiene independent   Dress street clothes;scrubs (behavioral health)   Laundry with supervision   Room Organization independent   Activity   Activity Assistance Provided independent

## 2019-07-26 NOTE — PROGRESS NOTES
Writer provided the patient with chemical dependency assessment paperwork to complete prior to having the assessment.

## 2019-07-26 NOTE — PROGRESS NOTES
[facility], [place]   Psychiatric Progress Note        Interim History:     The patient's care was discussed with the treatment team during the daily team meeting and/or staff's chart notes were reviewed.  Staff report patient spent more time outside of her room. She is very social and goes to groups. Today I talked to her while she was sitting near mirror in the unit corridor putting makeup. She was in a good mood, said she was thankful for care provided her here. She denied Suicidal ideation, had full range of affect. Reported mild abdominal discomfort. Followed by IM, see Internal Medicine below.   Brief medicine note:  - Pt's LFTs still slightly elevated but on downward trend, most likely due to her PTA etoh abuse and cirrhosis. She should follow up with her PCP after discharge within 1-2 weeks for hospital follow up and repeat LFTs. Medicine signing off. Please feel free to call with questions.          Medications:       acamprosate  666 mg Oral TID     DULoxetine  30 mg Oral Daily     escitalopram  20 mg Oral Daily     famotidine  20 mg Oral BID     folic acid  1 mg Oral Daily     gabapentin  1,000 mg Oral TID     lidocaine  1-3 patch Transdermal Q24H     lidocaine   Transdermal Q8H     lidocaine   Transdermal Q24h     LORazepam  0.25 mg Oral At Bedtime     menthol   Transdermal Q8H     multivitamin w/minerals  1 tablet Oral Daily     propranolol  10 mg Oral BID     vitamin B1  100 mg Oral Daily          Allergies:     Allergies   Allergen Reactions     Cranberry Extract Hives     Morphine Other (See Comments)     Vomiting     Penicillins Unknown     Sertraline Other (See Comments)     dreams     Unasyn      Celecoxib Rash     Sulfa Drugs Rash          Labs:     Recent Results (from the past 24 hour(s))   Hepatic panel    Collection Time: 07/26/19  7:44 AM   Result Value Ref Range    Bilirubin Direct 0.1 0.0 - 0.2 mg/dL    Bilirubin Total 0.3 0.2 - 1.3 mg/dL    Albumin 3.3 (L) 3.4  "- 5.0 g/dL    Protein Total 7.5 6.8 - 8.8 g/dL    Alkaline Phosphatase 191 (H) 40 - 150 U/L     (H) 0 - 50 U/L    AST 55 (H) 0 - 45 U/L          Psychiatric Examination:     /67   Pulse 74   Temp 98  F (36.7  C) (Oral)   Resp 16   Ht 1.575 m (5' 2\")   Wt 71.1 kg (156 lb 12.8 oz)   SpO2 95%   BMI 28.68 kg/m    Weight is 156 lbs 12.8 oz  Body mass index is 28.68 kg/m .    Orthostatic Vitals       Most Recent      Sitting Orthostatic /87 07/26 0802    Sitting Orthostatic Pulse (bpm) 93 07/26 0802    Standing Orthostatic /67 07/26 0802    Standing Orthostatic Pulse (bpm) 105 07/26 0802         Appearance: awake, alert and dressed in hospital scrubs  Attitude: cooperative  Eye Contact:  fair  Mood: better   Affect:  more animated today  Speech:  WNL  Psychomotor Behavior: no evidence of dystonia and intact station, gait and muscle tone  Throught Process:  goal oriented  Associations:  no loose associations  Thought Content:  no evidence of suicidal ideation or homicidal ideation  Insight:  limited, but improving   Judgement:  limited, but improving   Oriented to:  time, person, and place  Attention Span and Concentration:  fair  Recent and Remote Memory:  fair    Clinical Global Impressions  First: 7/4 7/18/2019      Most recent: 4/3 7/24/2019             Precautions:     Behavioral Orders   Procedures     Code 1 - Restrict to Unit     Elopement precautions     Routine Programming     As clinically indicated     Status 15     Every 15 minutes.     Suicide precautions     Patients on Suicide Precautions should have a Combination Diet ordered that includes a Diet selection(s) AND a Behavioral Tray selection for Safe Tray - with utensils, or Safe Tray - NO utensils       Withdrawal precautions          DIagnoses:     1.  Major depressive disorder, recurrent, moderate severity.   2.  Generalized anxiety disorder.   3.  History of panic disorder with agoraphobia.   4.  Alcohol use disorder, " severe, with dependence.     5.  Recent alcohol withdrawal symptoms.     6.  Liver cirrhosis.          Plan:   Was provided with paperwork for CD assessment. Will continue tapering off Lorazepam. Will also continue to taper off opiates. Will continue to provide support and structure. She is on a court hold and will continue with commitment process. Her final hearing is on Aug. 1..

## 2019-07-26 NOTE — PROGRESS NOTES
Patient visible/present in the milieu most of the evening so far.  Overall presentation pleasant, animated, calm, attentive to her surroundings, cooperative with unit protocols, and responsive to staff inquiries.Mood has been stable and affect full-range.  Verbalized thoughts have been linear and organized with no delusional references or overt psychotic distortions.  Endorsed moderate anxiety (4/10), low depression (2/10) levels, and significant difficulty with her concentration, but denied all other [place] issues, concerns, and acuities at this time.  Behaviorally speaking, she needed to be redirected about not touching others on one occasion (giving a male peer a neck massage), but accepted same without any pushback.  Other than that, patient's peer interactions have been generally positive and supportive.  She stated that she is awaiting the outcome of the upcoming hearing/examination to determine her need for residential CD treatment and is hoping that she will be allowed to be placed on a voluntary basis.  She stated further, that she knows that she needs said treatment and will offer no resistance to going.if that is the decision.   Marvin Avery   07/25/2019 07/25/19 2000   Sleep/Rest/Relaxation   Day/Evening Time Hours up all shift   Behavioral Health   Hallucinations denies / not responding to hallucinations   Thinking distractable;poor concentration   Orientation person: oriented;place: oriented   Memory new learning, recall loss   Insight admits / accepts;insight appropriate to situation   Judgement impaired   Eye Contact at examiner   Affect full range affect   Mood mood is calm   Physical Appearance/Attire appears stated age;attire appropriate to age and situation   Hygiene well groomed   Suicidality other (see comments)  (denied SI)   1. Wish to be Dead No   2. Non-Specific Active Suicidal Thoughts  No   Self Injury other (see comment)  (denied SIB urges)   Elopement   (no indicators)   Activity  other (see comment)  (visible,social, engaged in milieu activities)   Speech clear;coherent   Psychomotor / Gait balanced;steady   Coping/Psychosocial   Verbalized Emotional State acceptance;anxiety;depression  (rated anxiety and depression at 2-4/10)   Psycho Education   Type of Intervention 1:1 intervention   Response participates with encouragement   Hours 0.5   Treatment Detail current MH status   Safety   Suicidality Status 15   Elopement status 15   Activities of Daily Living   Hygiene/Grooming independent   Oral Hygiene independent   Dress independent   Room Organization independent   Groups   Details N/A

## 2019-07-26 NOTE — PROGRESS NOTES
Brief medicine note:  - Pt's LFTs still slightly elevated but on downward trend, most likely due to her PTA etoh abuse and cirrhosis. She should follow up with her PCP after discharge within 1-2 weeks for hospital follow up and repeat LFTs. Medicine signing off. Please feel free to call with questions.       Avni Kaur PA-C  Internal Medicine Hospitalist   [facility] Hospitalist group  [phone]

## 2019-07-26 NOTE — PLAN OF CARE
"Pt was visible in the milieu watching tv and coloring. Pt was social with others and attended activity group this evening. Presents with full range affect. Pt states \"I feel like myself again today\". Pt's goal for today was to attend all groups and she met the goal. Denies SI/SIB/AH/VH. Pt says that she feels at peace with the decision she has made to go to treatment. Rates depression \"2\" and anxiety \"4\" which she says is her baseline. Pt states she wants to go to a duo-diagnosis facility and her  has found a place that accepts her insurance. Pt is optimistic about the future.   "

## 2019-07-27 PROCEDURE — H2032 ACTIVITY THERAPY, PER 15 MIN: HCPCS

## 2019-07-27 PROCEDURE — 25000132 ZZH RX MED GY IP 250 OP 250 PS 637: Performed by: PHYSICIAN ASSISTANT

## 2019-07-27 PROCEDURE — 12400001 ZZH R&B MH UMMC

## 2019-07-27 PROCEDURE — 25000132 ZZH RX MED GY IP 250 OP 250 PS 637: Performed by: PSYCHIATRY & NEUROLOGY

## 2019-07-27 PROCEDURE — 25000132 ZZH RX MED GY IP 250 OP 250 PS 637: Performed by: NURSE PRACTITIONER

## 2019-07-27 RX ADMIN — FAMOTIDINE 20 MG: 20 TABLET, FILM COATED ORAL at 21:19

## 2019-07-27 RX ADMIN — FAMOTIDINE 20 MG: 20 TABLET, FILM COATED ORAL at 09:53

## 2019-07-27 RX ADMIN — OLANZAPINE 10 MG: 10 TABLET, FILM COATED ORAL at 00:08

## 2019-07-27 RX ADMIN — ACAMPROSATE CALCIUM 666 MG: 333 TABLET, DELAYED RELEASE ORAL at 09:52

## 2019-07-27 RX ADMIN — NICOTINE POLACRILEX 4 MG: 4 GUM, CHEWING ORAL at 19:24

## 2019-07-27 RX ADMIN — Medication: at 16:05

## 2019-07-27 RX ADMIN — DULOXETINE HYDROCHLORIDE 30 MG: 30 CAPSULE, DELAYED RELEASE ORAL at 09:52

## 2019-07-27 RX ADMIN — MULTIPLE VITAMINS W/ MINERALS TAB 1 TABLET: TAB at 09:53

## 2019-07-27 RX ADMIN — GABAPENTIN 1000 MG: 400 CAPSULE ORAL at 09:52

## 2019-07-27 RX ADMIN — NICOTINE POLACRILEX 4 MG: 4 GUM, CHEWING ORAL at 06:59

## 2019-07-27 RX ADMIN — LIDOCAINE 1 PATCH: 560 PATCH PERCUTANEOUS; TOPICAL; TRANSDERMAL at 21:18

## 2019-07-27 RX ADMIN — TRAZODONE HYDROCHLORIDE 50 MG: 50 TABLET ORAL at 21:19

## 2019-07-27 RX ADMIN — MENTHOL 1 PATCH: 205.5 PATCH TOPICAL at 09:56

## 2019-07-27 RX ADMIN — QUETIAPINE FUMARATE 25 MG: 25 TABLET ORAL at 06:59

## 2019-07-27 RX ADMIN — QUETIAPINE FUMARATE 25 MG: 25 TABLET ORAL at 14:31

## 2019-07-27 RX ADMIN — PROPRANOLOL HYDROCHLORIDE 10 MG: 10 TABLET ORAL at 21:18

## 2019-07-27 RX ADMIN — ACAMPROSATE CALCIUM 666 MG: 333 TABLET, DELAYED RELEASE ORAL at 14:31

## 2019-07-27 RX ADMIN — OXYCODONE HYDROCHLORIDE 5 MG: 5 TABLET ORAL at 06:58

## 2019-07-27 RX ADMIN — NICOTINE POLACRILEX 4 MG: 4 GUM, CHEWING ORAL at 21:19

## 2019-07-27 RX ADMIN — THIAMINE HCL TAB 100 MG 100 MG: 100 TAB at 09:53

## 2019-07-27 RX ADMIN — OLANZAPINE 10 MG: 10 TABLET, FILM COATED ORAL at 09:53

## 2019-07-27 RX ADMIN — NICOTINE POLACRILEX 4 MG: 4 GUM, CHEWING ORAL at 14:31

## 2019-07-27 RX ADMIN — GABAPENTIN 1000 MG: 400 CAPSULE ORAL at 21:19

## 2019-07-27 RX ADMIN — PROPRANOLOL HYDROCHLORIDE 10 MG: 10 TABLET ORAL at 09:52

## 2019-07-27 RX ADMIN — HYDROXYZINE HYDROCHLORIDE 50 MG: 50 TABLET, FILM COATED ORAL at 00:08

## 2019-07-27 RX ADMIN — ESCITALOPRAM OXALATE 20 MG: 20 TABLET ORAL at 09:52

## 2019-07-27 RX ADMIN — QUETIAPINE FUMARATE 25 MG: 25 TABLET ORAL at 21:20

## 2019-07-27 RX ADMIN — FOLIC ACID 1 MG: 1 TABLET ORAL at 09:53

## 2019-07-27 RX ADMIN — GABAPENTIN 1000 MG: 400 CAPSULE ORAL at 14:31

## 2019-07-27 RX ADMIN — NICOTINE POLACRILEX 4 MG: 4 GUM, CHEWING ORAL at 09:57

## 2019-07-27 RX ADMIN — HYDROXYZINE HYDROCHLORIDE 50 MG: 50 TABLET, FILM COATED ORAL at 23:12

## 2019-07-27 RX ADMIN — ACAMPROSATE CALCIUM 666 MG: 333 TABLET, DELAYED RELEASE ORAL at 21:19

## 2019-07-27 RX ADMIN — HYDROXYZINE HYDROCHLORIDE 50 MG: 50 TABLET, FILM COATED ORAL at 16:06

## 2019-07-27 ASSESSMENT — ACTIVITIES OF DAILY LIVING (ADL)
ORAL_HYGIENE: INDEPENDENT
DRESS: INDEPENDENT
HYGIENE/GROOMING: INDEPENDENT

## 2019-07-27 NOTE — PLAN OF CARE
"48 hour assessment:  reports mood today as \"really good\"  reports that she has been in good spirits for the last couple of days. \"it's like I'm back to myself\".  Continues to request PRN medication at regular intervals, requested and received Zyprexa 10 mg x 1 and Seroquel 25 mg x 1.   Denies suicidal thoughts, thoughts of self harm and hallucinations.  Pleasant and interacts appropriately with peers.  Reports has good visiti with family today.  States plan is for her to be discharged to a treatment facility in [place], [place].    "

## 2019-07-27 NOTE — PROGRESS NOTES
Patient has been calm and cooperative. She currently denies SI, SIB. She has been active and social.     - She hopes to obtain an order to have her children come and visit her on the unit.     - She hopes to find a none narcotic medicine to alleviates her pain without upsetting her stomach.

## 2019-07-28 PROCEDURE — 12400001 ZZH R&B MH UMMC

## 2019-07-28 PROCEDURE — 25000132 ZZH RX MED GY IP 250 OP 250 PS 637: Performed by: PHYSICIAN ASSISTANT

## 2019-07-28 PROCEDURE — 25000132 ZZH RX MED GY IP 250 OP 250 PS 637: Performed by: NURSE PRACTITIONER

## 2019-07-28 PROCEDURE — 25000132 ZZH RX MED GY IP 250 OP 250 PS 637: Performed by: PSYCHIATRY & NEUROLOGY

## 2019-07-28 RX ADMIN — HYDROXYZINE HYDROCHLORIDE 50 MG: 50 TABLET, FILM COATED ORAL at 21:12

## 2019-07-28 RX ADMIN — GABAPENTIN 1000 MG: 400 CAPSULE ORAL at 21:12

## 2019-07-28 RX ADMIN — FAMOTIDINE 20 MG: 20 TABLET, FILM COATED ORAL at 21:12

## 2019-07-28 RX ADMIN — OLANZAPINE 10 MG: 10 TABLET, FILM COATED ORAL at 10:18

## 2019-07-28 RX ADMIN — GABAPENTIN 1000 MG: 400 CAPSULE ORAL at 14:51

## 2019-07-28 RX ADMIN — PROPRANOLOL HYDROCHLORIDE 10 MG: 10 TABLET ORAL at 21:12

## 2019-07-28 RX ADMIN — NICOTINE POLACRILEX 4 MG: 4 GUM, CHEWING ORAL at 14:51

## 2019-07-28 RX ADMIN — NICOTINE POLACRILEX 4 MG: 4 GUM, CHEWING ORAL at 07:52

## 2019-07-28 RX ADMIN — ESCITALOPRAM OXALATE 20 MG: 20 TABLET ORAL at 07:52

## 2019-07-28 RX ADMIN — GABAPENTIN 1000 MG: 400 CAPSULE ORAL at 07:52

## 2019-07-28 RX ADMIN — DULOXETINE HYDROCHLORIDE 30 MG: 30 CAPSULE, DELAYED RELEASE ORAL at 07:52

## 2019-07-28 RX ADMIN — FAMOTIDINE 20 MG: 20 TABLET, FILM COATED ORAL at 07:52

## 2019-07-28 RX ADMIN — QUETIAPINE FUMARATE 25 MG: 25 TABLET ORAL at 14:51

## 2019-07-28 RX ADMIN — QUETIAPINE FUMARATE 25 MG: 25 TABLET ORAL at 06:34

## 2019-07-28 RX ADMIN — OLANZAPINE 10 MG: 10 TABLET, FILM COATED ORAL at 19:08

## 2019-07-28 RX ADMIN — ACAMPROSATE CALCIUM 666 MG: 333 TABLET, DELAYED RELEASE ORAL at 21:12

## 2019-07-28 RX ADMIN — MENTHOL 1 PATCH: 205.5 PATCH TOPICAL at 10:19

## 2019-07-28 RX ADMIN — ACAMPROSATE CALCIUM 666 MG: 333 TABLET, DELAYED RELEASE ORAL at 14:51

## 2019-07-28 RX ADMIN — FOLIC ACID 1 MG: 1 TABLET ORAL at 07:52

## 2019-07-28 RX ADMIN — THIAMINE HCL TAB 100 MG 100 MG: 100 TAB at 07:52

## 2019-07-28 RX ADMIN — TRAZODONE HYDROCHLORIDE 50 MG: 50 TABLET ORAL at 21:13

## 2019-07-28 RX ADMIN — ACAMPROSATE CALCIUM 666 MG: 333 TABLET, DELAYED RELEASE ORAL at 07:52

## 2019-07-28 RX ADMIN — NICOTINE POLACRILEX 4 MG: 4 GUM, CHEWING ORAL at 20:35

## 2019-07-28 RX ADMIN — MULTIPLE VITAMINS W/ MINERALS TAB 1 TABLET: TAB at 07:52

## 2019-07-28 RX ADMIN — NICOTINE POLACRILEX 4 MG: 4 GUM, CHEWING ORAL at 16:53

## 2019-07-28 RX ADMIN — LIDOCAINE 1 PATCH: 560 PATCH PERCUTANEOUS; TOPICAL; TRANSDERMAL at 21:12

## 2019-07-28 RX ADMIN — NICOTINE POLACRILEX 4 MG: 4 GUM, CHEWING ORAL at 11:32

## 2019-07-28 RX ADMIN — PROPRANOLOL HYDROCHLORIDE 10 MG: 10 TABLET ORAL at 07:52

## 2019-07-28 ASSESSMENT — ACTIVITIES OF DAILY LIVING (ADL)
ORAL_HYGIENE: INDEPENDENT
HYGIENE/GROOMING: INDEPENDENT
LAUNDRY: WITH SUPERVISION
DRESS: INDEPENDENT
HYGIENE/GROOMING: INDEPENDENT
DRESS: STREET CLOTHES
ORAL_HYGIENE: INDEPENDENT

## 2019-07-28 NOTE — PROGRESS NOTES
07/28/19 1340   Behavioral Health   Hallucinations denies / not responding to hallucinations   Thinking poor concentration   Orientation person: oriented;place: oriented   Memory baseline memory   Insight poor   Judgement impaired   Eye Contact at examiner   Affect full range affect   Mood anxious   Physical Appearance/Attire attire appropriate to age and situation   Hygiene well groomed   Suicidality other (see comments)  (pt denies)   1. Wish to be Dead No  (pt denies)   2. Non-Specific Active Suicidal Thoughts    (pt denies)   Self Injury   (pt denies)   Activity   (present in the milieu.)   Speech coherent;clear   Medication Sensitivity no observed side effects;no stated side effects   Psychomotor / Gait balanced;steady   Psycho Education   Type of Intervention 1:1 intervention   Response participates with encouragement   Treatment Detail   (1:1 check in)   Activities of Daily Living   Hygiene/Grooming independent   Oral Hygiene independent   Dress independent   Room Organization independent   Pt denies SI/SIB. She rates her anxiety as a 4/10 stating that this is her baseline. During the day shift she has been present and social in the milieu. Currently her family member is on the unit visiting.

## 2019-07-28 NOTE — PROGRESS NOTES
07/27/19 1300   Art Therapy   Type of Intervention structured groups   Response participates with encouragement   Hours 1   Treatment Detail Art Therapy-drawing/ coloring   Problem-1.  Major depressive disorder, recurrent, moderate severity.   2.  Generalized anxiety disorder.   3.  History of panic disorder with agoraphobia.   4.  Alcohol use disorder, severe, with dependence.     5.  Recent alcohol withdrawal symptoms.     6.  Liver cirrhosis.      Goal- cope, express, regulate through Art Therapy directives     Outcome- pt was engaged for the full hour. She doesn't like to try new art ideas. She prefers coloring and she did draw a freehand butterfly today. She enjoyed making music selections and socializing. She talks about loving to do service work. 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

## 2019-07-28 NOTE — PROGRESS NOTES
Patient visible better part of the evening in milieu watching tv with peers. Pleasant and approachable. Patient offers no complaints. Anticipating discharge to tx.         07/27/19 1939   Behavioral Health   Hallucinations denies / not responding to hallucinations   Thinking intact   Orientation person: oriented   Memory baseline memory   Insight admits / accepts;insight appropriate to situation   Judgement impaired   Affect full range affect   Mood mood is calm   1. Wish to be Dead No   Speech coherent;clear

## 2019-07-29 PROCEDURE — 25000132 ZZH RX MED GY IP 250 OP 250 PS 637: Performed by: PSYCHIATRY & NEUROLOGY

## 2019-07-29 PROCEDURE — 12400001 ZZH R&B MH UMMC

## 2019-07-29 PROCEDURE — 40000007 ZZH STATISTIC ADULT CD FACE TO FACE-NO CHRG

## 2019-07-29 PROCEDURE — 25000132 ZZH RX MED GY IP 250 OP 250 PS 637: Performed by: PHYSICIAN ASSISTANT

## 2019-07-29 PROCEDURE — G0177 OPPS/PHP; TRAIN & EDUC SERV: HCPCS

## 2019-07-29 PROCEDURE — 99232 SBSQ HOSP IP/OBS MODERATE 35: CPT | Performed by: PSYCHIATRY & NEUROLOGY

## 2019-07-29 RX ADMIN — QUETIAPINE FUMARATE 25 MG: 25 TABLET ORAL at 19:09

## 2019-07-29 RX ADMIN — NICOTINE POLACRILEX 4 MG: 4 GUM, CHEWING ORAL at 07:52

## 2019-07-29 RX ADMIN — FAMOTIDINE 20 MG: 20 TABLET, FILM COATED ORAL at 07:53

## 2019-07-29 RX ADMIN — LIDOCAINE 1 PATCH: 560 PATCH PERCUTANEOUS; TOPICAL; TRANSDERMAL at 21:42

## 2019-07-29 RX ADMIN — THIAMINE HCL TAB 100 MG 100 MG: 100 TAB at 07:52

## 2019-07-29 RX ADMIN — MULTIPLE VITAMINS W/ MINERALS TAB 1 TABLET: TAB at 07:52

## 2019-07-29 RX ADMIN — ACAMPROSATE CALCIUM 666 MG: 333 TABLET, DELAYED RELEASE ORAL at 07:52

## 2019-07-29 RX ADMIN — GABAPENTIN 1000 MG: 400 CAPSULE ORAL at 07:52

## 2019-07-29 RX ADMIN — ACAMPROSATE CALCIUM 666 MG: 333 TABLET, DELAYED RELEASE ORAL at 13:31

## 2019-07-29 RX ADMIN — ACAMPROSATE CALCIUM 666 MG: 333 TABLET, DELAYED RELEASE ORAL at 21:43

## 2019-07-29 RX ADMIN — GABAPENTIN 1000 MG: 400 CAPSULE ORAL at 13:31

## 2019-07-29 RX ADMIN — DULOXETINE HYDROCHLORIDE 30 MG: 30 CAPSULE, DELAYED RELEASE ORAL at 07:52

## 2019-07-29 RX ADMIN — OLANZAPINE 10 MG: 10 TABLET, FILM COATED ORAL at 07:52

## 2019-07-29 RX ADMIN — PROPRANOLOL HYDROCHLORIDE 10 MG: 10 TABLET ORAL at 21:42

## 2019-07-29 RX ADMIN — PROPRANOLOL HYDROCHLORIDE 10 MG: 10 TABLET ORAL at 07:53

## 2019-07-29 RX ADMIN — NICOTINE POLACRILEX 4 MG: 4 GUM, CHEWING ORAL at 19:09

## 2019-07-29 RX ADMIN — OLANZAPINE 10 MG: 10 TABLET, FILM COATED ORAL at 13:31

## 2019-07-29 RX ADMIN — TRAZODONE HYDROCHLORIDE 50 MG: 50 TABLET ORAL at 21:43

## 2019-07-29 RX ADMIN — ESCITALOPRAM OXALATE 20 MG: 20 TABLET ORAL at 07:52

## 2019-07-29 RX ADMIN — QUETIAPINE FUMARATE 25 MG: 25 TABLET ORAL at 10:58

## 2019-07-29 RX ADMIN — FOLIC ACID 1 MG: 1 TABLET ORAL at 07:52

## 2019-07-29 RX ADMIN — FAMOTIDINE 20 MG: 20 TABLET, FILM COATED ORAL at 21:43

## 2019-07-29 RX ADMIN — NICOTINE POLACRILEX 4 MG: 4 GUM, CHEWING ORAL at 21:07

## 2019-07-29 RX ADMIN — GABAPENTIN 1000 MG: 400 CAPSULE ORAL at 21:42

## 2019-07-29 ASSESSMENT — ACTIVITIES OF DAILY LIVING (ADL)
LAUNDRY: UNABLE TO COMPLETE
DRESS: INDEPENDENT
LAUNDRY: WITH SUPERVISION
ORAL_HYGIENE: INDEPENDENT
DRESS: INDEPENDENT
HYGIENE/GROOMING: INDEPENDENT
ORAL_HYGIENE: INDEPENDENT
HYGIENE/GROOMING: INDEPENDENT

## 2019-07-29 NOTE — PROGRESS NOTES
"[facility], [place]   Psychiatric Progress Note        Interim History:     The patient's care was discussed with the treatment team during the daily team meeting and/or staff's chart notes were reviewed.  Staff report patient spent more time outside of her room. She is very social and goes to groups. She is in a good and stable mood. Her  had visited her over weekend. She reports being in more pain after opiates were stopped, but dealing with this pain by using non controlled substances. She is waiting for her hearing on Aug 1 and hopes to go to [facility].          Medications:       acamprosate  666 mg Oral TID     DULoxetine  30 mg Oral Daily     escitalopram  20 mg Oral Daily     famotidine  20 mg Oral BID     folic acid  1 mg Oral Daily     gabapentin  1,000 mg Oral TID     lidocaine  1-3 patch Transdermal Q24H     lidocaine   Transdermal Q8H     lidocaine   Transdermal Q24h     menthol   Transdermal Q8H     multivitamin w/minerals  1 tablet Oral Daily     propranolol  10 mg Oral BID     vitamin B1  100 mg Oral Daily          Allergies:     Allergies   Allergen Reactions     Cranberry Extract Hives     Morphine Other (See Comments)     Vomiting     Penicillins Unknown     Sertraline Other (See Comments)     dreams     Unasyn      Celecoxib Rash     Sulfa Drugs Rash          Labs:     No results found for this or any previous visit (from the past 24 hour(s)).       Psychiatric Examination:     /74   Pulse 77   Temp 98.3  F (36.8  C) (Oral)   Resp 16   Ht 1.575 m (5' 2\")   Wt 71.1 kg (156 lb 12.8 oz)   SpO2 98%   BMI 28.68 kg/m    Weight is 156 lbs 12.8 oz  Body mass index is 28.68 kg/m .    Orthostatic Vitals       Most Recent      Sitting Orthostatic /89 07/29 0749    Sitting Orthostatic Pulse (bpm) 80 07/29 0749    Standing Orthostatic /87 07/29 0749    Standing Orthostatic Pulse (bpm) 91 07/29 0749         Appearance: awake, alert and " dressed in hospital scrubs  Attitude: cooperative  Eye Contact:  fair  Mood: better   Affect:  more animated today  Speech:  WNL  Psychomotor Behavior: no evidence of dystonia and intact station, gait and muscle tone  Throught Process:  goal oriented  Associations:  no loose associations  Thought Content:  no evidence of suicidal ideation or homicidal ideation  Insight:  limited, but improving   Judgement:  limited, but improving   Oriented to:  time, person, and place  Attention Span and Concentration:  fair  Recent and Remote Memory:  fair    Clinical Global Impressions  First: 7/4 7/18/2019      Most recent: 4/3 7/24/2019             Precautions:     Behavioral Orders   Procedures     Code 1 - Restrict to Unit     Elopement precautions     Routine Programming     As clinically indicated     Status 15     Every 15 minutes.     Suicide precautions     Patients on Suicide Precautions should have a Combination Diet ordered that includes a Diet selection(s) AND a Behavioral Tray selection for Safe Tray - with utensils, or Safe Tray - NO utensils       Withdrawal precautions          DIagnoses:     1.  Major depressive disorder, recurrent, moderate severity.   2.  Generalized anxiety disorder.   3.  History of panic disorder with agoraphobia.   4.  Alcohol use disorder, severe, with dependence.     5.  Recent alcohol withdrawal symptoms.     6.  Liver cirrhosis.          Plan:   She had CD assessment. Tapered off Lorazepam and opiates. Will continue to provide support and structure. She is on a court hold and will continue with commitment process. Her final hearing is on Aug. 1..

## 2019-07-29 NOTE — PROGRESS NOTES
Pt spent most of her time in the lounge watching TV and coloring. She reports doing well even she is having pain but manageable. She had her family visiting and waiting for the court to be finalized. She wants to get to treatment and sober house which will be closure to her home. She appear sociable to all peers and less anxiety.      07/28/19 2154   Behavioral Health   Hallucinations denies / not responding to hallucinations   Thinking intact   Orientation date: oriented;time: oriented;place: oriented;person: oriented   Memory baseline memory   Insight admits / accepts   Judgement impaired   Eye Contact at examiner   Affect full range affect   Mood anxious   Physical Appearance/Attire attire appropriate to age and situation   Hygiene well groomed   Suicidality other (see comments)  (Denies)   1. Wish to be Dead No   2. Non-Specific Active Suicidal Thoughts  No   Speech clear;coherent   Medication Sensitivity no observed side effects   Psychomotor / Gait balanced;steady   Psycho Education   Type of Intervention 1:1 intervention   Response participates, initiates socially appropriate   Hours 0.5   Activities of Daily Living   Hygiene/Grooming independent   Oral Hygiene independent   Dress street clothes   Laundry with supervision   Room Organization independent   Activity   Activity Assistance Provided independent

## 2019-07-29 NOTE — PROGRESS NOTES
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 ASSIGNED - (What information did the person provide that indicates his or her understanding of relapse issues? What about the person s experience indicates how prone he or she is to relapse? What coping skills does the person have that decrease relapse potential?)      The patient appears to lack sober coping skills and she lacks long-term sober maintenance skills.  The patient has dual issues of mental health issues and substance abuse.  The patient has chronic medical problems which could be a barrier to her recovery.  The patient reported being socially isolated which could be a barrier to her recovery.  The patient reported her relationship conflict with her  could be a potential trigger for her to abuse mood altering chemicals.  The patient reported her current unemployment is a potential trigger for her to abuse mood altering chemicals.  The patient lacks a sober peer support network. Patient has a history of relapsing shortly after completing treatment.        DIMENSION VI - Recovery Environment   1. Are you employed/attending school? Tell me about that.     Pt reports she is a , has not worked in 1.5/2 years due to alcohol use and medical issues.    2A. " Describe a typical day; evening for you. Work, school, social, leisure, volunteer, spiritual practices. Include time spent obtaining, using, recovering from drugs or alcohol. (DSM)     Pt reports taking care of her children, but not taking care of herself.     Please describe what leisure activities have been associated with your substance abuse:     The patient denied having any leisure activities which had been associated with her substance abuse.    2B. How often do you spend more time than you planned using or use more than you planned? (DSM)     Pt reports every time she drinks, she drinks more than planned.     3. How important is using to your social connections? Do many of your family or friends use?     Pt reports her entire biological family drinks and so does all of her friends.     4A. Are you currently in a significant relationship?     Yes.  4B. How long? 22 years           Please describe your significant other's use of mood altering chemicals? Doesn't use.    4C. Sexual Orientation:     Heterosexual    5A. Who do you live with?       and 4 children    5B. Tell me about their alcohol/drug use and mental health issues.     Pt denies any concerns.    5C. Are you concerned for your safety there? No    5D. Are you concerned about the safety of anyone else who lives with you? No    6A. Do you have children who live with you?     Yes.  (Ask follow-up questions to determine the person's relationship and responsibility, both legal and care giving; and what arrangements are made for supervision for the children when the person is not available.) 18,15,13,9    6B. Do you have children who do not live with you?     Yes.  (Ask follow-up questions to determine the person's relationship and responsibility, both legal and care giving; and what arrangements are made for supervision for the children when the person is not available.) 18 year old lives at home currently, will go back to college in fall.    7A.  Who supports you in making changes in your alcohol or drug use? What are they willing to do to support you? Who is upset or angry about you making changes in your alcohol or drug use? How big a problem is this for you?      Pt reports everyone close to her is supportive.     7B. This table is provided to record information about the person s relationships and available support It is not necessary to ask each item; only to get a comprehensive picture of their support system.  How often can you count on the following people when you need someone?   Partner / Spouse Usually supportive   Parent(s)/Aunt(s)/Uncle(s)/Grandparents Always supportive   Sibling(s)/Cousin(s) Always supportive   Child(elle) Always supportive   Other relative(s) The patient doesn't have any current contact with other relatives.   Friend(s)/neighbor(s) Rarely supportive   Child(elle) s father(s)/mother(s) Usually supportive   Support group member(s) Usually supportive   Community of alannah members Usually supportive   /counselor/therapist/healer Always supportive   Other (specify) No     8A. What is your current living situation?     Pt lives with her  and 4 children.    8B. What is your long term plan for where you will be living?     Inpatient treatment and then sober housing.    8C. Tell me about your living environment/neighborhood? Ask enough follow up questions to determine safety, criminal activity, availability of alcohol and drugs, supportive or antagonistic to the person making changes.      Pt denies concerns.    9. Criminal justice history: Gather current/recent history and any significant history related to substance use--Arrests? Convictions? Circumstances? Alcohol or drug involvement? Sentences? Still on probation or parole? Expectations of the court? Current court order? Any sex offenses - lifetime? What level? (DSM)    None    Commitment: Denies - currently in the process though, most likely a stay of  commitment.  Registered Sex Offender: Denies    10. What obstacles exist to participating in treatment? (Time off work, childcare, funding, transportation, pending halfway time, living situation)     The patient denied having any obstacles for participating in substance abuse treatment.    Dimension VI Ratings   Recovery environment - The placing authority must use the criteria in Dimension VI to determine a client s recovery environment.   RISK DESCRIPTIONS - Severity rating: 3 Client is not engaged in structured, meaningful activity and the client's peers, family, significant other, and living environment are unsupportive, or there is significant criminal justice system involvement.    REASONS SEVERITY WAS ASSIGNED - (What support does the person have for making changes? What structure/stability does the person have in his or her daily life that will increase the likelihood that changes can be sustained? What problems exist in the person s environment that will jeopardize getting/staying clean and sober?)     Patient lives with her  and 4 children. Patient is not employed, has not worked for about 2 years due to her medical issues and alcohol abuse. 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 Patient reports her  is supportive, he did file divorce papers this month. Patient reports he is still visiting daily and they have a good relationship. Pt reports she hopes if she can stay sober they won't go through with the divorce. Patient has 4 children, 3 are minors. Pt's eldest is in college, is home for the summer. Patient denies current or past legal issues.        Client Choice/Exceptions   Would you like services specific to language, age, gender, culture, Catholic preference, race, ethnicity, sexual orientation or disability?  No    What particular treatment choices and options would you like to have? Residential/inpatient    Do you have a preference for a particular treatment program? [facility]    Criteria  for Diagnosis     Criteria for Diagnosis  DSM-5 Criteria for Substance Use Disorder  Instructions: Determine whether the client currently meets the criteria for Substance Use Disorder using the diagnostic criteria in the DSM-V pp.[phone]. Current means during the most recent 12 months outside a facility that controls access to substances    Category of Substance Severity (ICD-10 Code / DSM 5 Code)     Alcohol Use Disorder Severe  (10.20) (303.90)   Cannabis Use Disorder The patient does not meet the criteria for a Cannabis use disorder.   Hallucinogen Use Disorder The patient does not meet the criteria for a Hallucinogen use disorder.   Inhalant Use Disorder The patient does not meet the criteria for an Inhalant use disorder.   Opioid Use Disorder The patient does not meet the criteria for an Opioid use disorder.   Sedative, Hypnotic, or Anxiolytic Use Disorder The patient does not meet the criteria for a Sedative/Hypnotic use disorder.   Stimulant Related Disorder The patient does not meet the criteria for a Stimulant use disorder.   Tobacco Use Disorder Mild    (Z72.0) (305.1)   Other (or unknown) Substance Use Disorder The patient does not meet the criteria for a Other (or unknown) Substance use disorder.       Collateral Contact Summary   Number of contacts made: 1    Contact with referring person:  No    If court related records were reviewed, summarize here: No court records had been reviewed at the time of this documentation.    Information from collateral contacts supported/largely agreed with information from the client and associated risk ratings.      Rule 25 Assessment Summary and Plan   's Recommendation    1) Abstain from alcohol and all illicit drugs and mood altering drugs unless prescribed by a healthcare giver familiar with their addiction.  2) Enter and complete MI/CD Residential treatment at [facility] or similar program and follow counselor recommendations.  3) Arrange for sober  "supportive living upon discharge.  4) Develop a sober supportive network.  5) Continue to receive appropriate medical and psychiatric services as needed.       Collateral Contacts     Name:    [facility]   Relationship:    Medical records   Phone Number:    None Releases:    Yes     Admitted:     07/17/2019      PSYCHIATRIC EVALUATION      The patient was seen on 07/18 for 70 minutes.  Greater than 50% of this time was spent on counseling and coordinating care, clarifying diagnostic and prognostic issues and presence of support in community.  Discussed with patient alcohol dependence issues and her plans after discharge.  I also spent significant time talking about her abdominal pain.      HISTORY OF PRESENT ADMISSION:  The patient is a 43-year-old  female who was transferred to this Unit 20 on 72-hour hold from [facility].  The patient was admitted there for alcohol detoxification.  Since discharge, the patient was admitted there for alcohol detoxification.      HISTORY OF PRESENT ILLNESS:  The patient does not present as a reliable historian; in fact, most of the information came from collateral sources from computer records.  The patient did complain significantly about abdominal pain and was talking about how pain medication she was provided here (Roxicodone) was not sufficient for her pain.  She did look pretty depressed and very uncomfortable.  She denied presence of suicidal thoughts but told me that she attempted suicide a few months ago.  She firmly said that she would not go to a chemical dependency treatment program, \"I know about chemical dependency treatment, they can teach it to other people.\"  She reports difficulties with sleep, hopeless, helpless, poor concentration, low energy and high anxiety.      PAST PSYCHIATRIC HISTORY:  The patient reported that in the past she tried multiple psychotropic medications including Xanax, Inderal, Effexor, Prozac, Paxil, Celexa and, most recently, Lexapro " and acamprosate.  She has also been treated with propranolol for anxiety.  She reports that she was in a room in a treatment program at [facility] that was in the last year and that she was able to stay sober, but then relapsed and went back to drinking.  In 04/2019, patient was hospitalized at [facility] after she texted her  she that she going to the shed, take the dog's lease and hang herself.  The patient reported no manic episodes in the past, did describe some mood swings, most likely related to poor coping skills and life skills, ongoing alcohol use.  She denied ever having psychotic symptoms.  In addition to acamprosate, she also used to be on naltrexone.      MEDICATIONS ON ADMISSION:     1.  Lexapro 20 mg daily.   2.  Gabapentin 800 mg 3 times a day.   3.  Vistaril 25-50 mg 2 times a day p.r.n. for anxiety.   4.  Propranolol 10 mg 2 times a day.   5.  Acamprosate 666 mg 3 times a day.   6.  Pepcid 20 mg 2 times a day.      ALLERGIES:  SHE REPORTED BE ALLERGIC TO CRANBERRY EXTRACT, MORPHINE, PENICILLIN, SERTRALINE, UNASYN, CELECOXIB AND SULFA.        PAST MEDICAL HISTORY:  Significant for kidney disease.  She states she has a history of liver cirrhosis, chronic migraines.  The patient reports history of delirium tremens and being told that she has liver cirrhosis.      FAMILY AND SOCIAL HISTORY:  She said that she grew up in [place].  High school graduate and got an Associate degree and went on to work as a  for over 16 years.  She has not been working for about 1-1/2-2 years.  She has 4 children, 3 daughters and 1 son.  Describes her relationship with her  as estranged due to mental health and alcohol use.  All this information came from computer records.  Patient denied any past or present legal problems.  She said that parents got  when she was 14 years old.  She has 1 older sister, a younger brother and does have a younger stepsister and older  stepbrother.  She said that everybody in the family are alcoholics, including mother, father, brothers and sisters as well as both sets of grandparents.  She denies any sexual or physical abuse while growing up.      PHYSICAL EXAMINATION/REVIEW OF SYSTEMS:  For physical examination and 12-point review of systems, please refer to Dr. Israel's note 07/17/2019.  I reviewed this note and agree with it.        VITAL SIGNS:  Temperature 98.6, respirations 16, heart rate 82, blood pressure 92/67.      MENTAL STATUS EXAMINATION:  The patient is a 43-year-old white female dressed in hospital garbs.  She is clearly uncomfortable, was sitting on a chair and moaning quite loudly, complaining about abdominal pain.  She also appeared to be pretty irritable.  Speech was regular in rate and rhythm.  She reported feeling depressed, but denied suicidal or homicidal ideation.  Affect was restricted, congruent with mood.  Denied any symptoms of psychosis, hypomania or bharat.  Fund of knowledge appeared to be average with proper usage of vocabulary.  There was no loosening of associations or flight of ideas.  Thought process is logical and goal directed.  Insight and judgment are limited.  She is alert and oriented x3.  Cognition and memory functions are impaired.  Station, gait and coordination within normal limits.  Attention and concentration are decreased.  Language is intact with no articulation problem.      IMPRESSION:   1.  Major depressive disorder, recurrent, moderate severity.   2.  Generalized anxiety disorder.   3.  History of panic disorder with agoraphobia.   4.  Alcohol use disorder, severe, with dependence.     5.  Recent alcohol withdrawal symptoms.     6.  Liver cirrhosis.      TREATMENT PLAN:  The patient will be restarted on her home medication.  We will treat her pain with Roxicodone.  She was treated at [facility] with Valium.  I felt it appropriate, given the fact of her impaired liver, I will continue  patient on lorazepam with a plan to discontinue it eventually.  I will ask for Internal Medicine input to help deal with the patient's medical problems.  She already indicated that she would not like to go into a chemical dependency treatment program and will be continued for now on a 72-hour until withdrawal symptoms are gone and then likely will discharge home against medical advice.      BRIANA ARMANDO MD         A problematic pattern of alcohol/drug use leading to clinically significant impairment or distress, as manifested by at least two of the following, occurring within a 12-month period:    1.) Alcohol/drug is often taken in larger amounts or over a longer period than was intended.  2.) There is a persistent desire or unsuccessful efforts to cut down or control alcohol/drug use  3.) A great deal of time is spent in activities necessary to obtain alcohol, use alcohol, or recover from its effects.  4.) Craving, or a strong desire or urge to use alcohol/drug  5.) Recurrent alcohol/drug use resulting in a failure to fulfill major role obligations at work, school or home.  6.) Continued alcohol use despite having persistent or recurrent social or interpersonal problems caused or exacerbated by the effects of alcohol/drug.  7.) Important social, occupational, or recreational activities are given up or reduced because of alcohol/drug use.  8.) Recurrent alcohol/drug use in situations in which it is physically hazardous.  9.) Alcohol/drug use is continued despite knowledge of having a persistent or recurrent physical or psychological problem that is likely to have been caused or exacerbated by alcohol.  10.) Tolerance, as defined by either of the following: A need for markedly increased amounts of alcohol/drug to achieve intoxication or desired effect.  11.) Withdrawal, as manifested by either of the following: The characteristic withdrawal syndrome for alcohol/drug (refer to Criteria A and B of the criteria  set for alcohol/drug withdrawal). and Alcohol/drug (or a closely related substance, such as a benzodiazepine) is taken to relieve or avoid withdrawal symptoms.      Specify if: In early remission:  After full criteria for alcohol/drug use disorder were previously met, none of the criteria for alcohol/drug use disorder have been met for at least 3 months but for less than 12 months (with the exception that Criterion A4,  Craving or a strong desire or urge to use alcohol/drug  may be met).     In sustained remission:   After full criteria for alcohol use disorder were previously met, none of the criteria for alcohol/drug use disorder have been met at any time during a period of 12 months or longer (with the exception that Criterion A4,  Craving or strong desire or urge to use alcohol/drug  may be met).   Specify if:   This additional specifier is used if the individual is in an environment where access to alcohol is restricted.    Mild: Presence of 2-3 symptoms  Moderate: Presence of 4-5 symptoms  Severe: Presence of 6 or more symptoms

## 2019-07-29 NOTE — PROGRESS NOTES
07/29/19 1352   Behavioral Health   Hallucinations denies / not responding to hallucinations   Thinking intact   Orientation person: oriented;place: oriented;date: oriented;time: oriented   Memory baseline memory   Insight admits / accepts   Judgement intact   Eye Contact at examiner   Affect full range affect   Mood mood is calm   Physical Appearance/Attire attire appropriate to age and situation   Hygiene well groomed   Suicidality   (pt denies)   Self Injury   (none observed)   Elopement   (none observed )   Activity   (social with others)   Speech clear;coherent   Medication Sensitivity no stated side effects;no observed side effects   Psychomotor / Gait balanced;steady   Psycho Education   Type of Intervention structured groups   Response participates, initiates socially appropriate   Hours 0.5   Activities of Daily Living   Hygiene/Grooming independent   Oral Hygiene independent   Dress independent   Laundry unable to complete   Room Organization independent     Pt had a good shift and was very cooperative. She was social with others, and present in groups. Pt denied thoughts of SI and SIB, as well as feelings of anxiety or depression.
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
## 2019-07-29 NOTE — CONSULTS
7/29/2019    Writer met with pt and completed CD Eval. Pt reports she would like to attend [facility] in [place]. Pt signed a CHRISTY for writer to send assessment there after it's finished. Pt was given the number for [facility] as well to follow up if she has any specific questions.     Geraldine Patrick, [facility]  [phone]

## 2019-07-29 NOTE — PROGRESS NOTES
Occupational Therapy Initial Assessment     Description: OT staff met with pt to review the role of occupational therapy and explain the value of having them involved in their treatment plan including options to meet current needs/self-identified goals. The below evaluation is a compilation of chart review, functional performance observation, and reports within group context.    Pt has been in/out of treatment in recent history, creating instability within daily routine.      07/29/19 0956   Clinical Impression   Affect Appropriate to situation   Orientation Oriented to person, place and time   Appearance and ADLs Neatly groomed   Attention to Internal Stimuli No observed signs   Interaction Skills Interacts appropriately with staff;Interacts appropriately with peers  (highly engaged in conversation - enjoys sharing personal experiences and supporting peers )   Ability to Communicate Needs Independent   Verbal Content Articulate   Ability to Maintain Boundaries Maintains appropriate physical boundaries;Maintains appropriate verbal boundaries   Participation Participates with minimal encouragement   Concentration Concentrates 50 minutes   Ability to Concentrate Without difficulty  (utilizes coloring/doodling as a means to stay focused during conversations)   Follows and Comprehends Directions Independently follows multi-step directions   Memory Delayed and immediate recall intact   Organization Independently organizes simple tasks;Needs further assessment   Decision Making Independent   Planning and Problem Solving Needs further assessment   Ability to Apply and Learn Concepts Applies within group structure  (has extensive knowledge on coping skills and reports use of preferred skills on the unit (i.e. yoga, meditation, doodling))   Frustrations / Stress Tolerance Independently identifies sources of frustration/stress  (stressors: finances, relationship with , withdrawl sx. skills: identified several however  unable to impliment in the community for sustained MH )   Self Esteem Can identify positives   Social Supports Has knowledge of support systems     Pt identified interest to explore healthy coping strategies via meditation, yoga, processing groups, group games, and ability to listen to music in hopes to improve overall MH stability and continued engagement on the unit.       Assessment: Pt would benefit from engagement in OT groups that support healthy recovery, specifically exploration of positive coping skills for symptom management/relapse prevention.     Plan: Initiate occupational therapy goals per plan of care.     Pt will practice using >2 coping strategies to manage stress and reduce symptoms to demonstrate increased readiness for discharge.

## 2019-07-30 PROCEDURE — 25000132 ZZH RX MED GY IP 250 OP 250 PS 637: Performed by: PSYCHIATRY & NEUROLOGY

## 2019-07-30 PROCEDURE — 25000132 ZZH RX MED GY IP 250 OP 250 PS 637: Performed by: NURSE PRACTITIONER

## 2019-07-30 PROCEDURE — 25000132 ZZH RX MED GY IP 250 OP 250 PS 637: Performed by: PHYSICIAN ASSISTANT

## 2019-07-30 PROCEDURE — 12400001 ZZH R&B MH UMMC

## 2019-07-30 PROCEDURE — G0177 OPPS/PHP; TRAIN & EDUC SERV: HCPCS

## 2019-07-30 RX ORDER — IBUPROFEN 600 MG/1
600 TABLET, FILM COATED ORAL EVERY 6 HOURS PRN
Status: DISCONTINUED | OUTPATIENT
Start: 2019-07-30 | End: 2019-08-06 | Stop reason: HOSPADM

## 2019-07-30 RX ORDER — QUETIAPINE FUMARATE 50 MG/1
50 TABLET, FILM COATED ORAL 3 TIMES DAILY PRN
Status: DISCONTINUED | OUTPATIENT
Start: 2019-07-30 | End: 2019-08-06 | Stop reason: HOSPADM

## 2019-07-30 RX ADMIN — DULOXETINE HYDROCHLORIDE 30 MG: 30 CAPSULE, DELAYED RELEASE ORAL at 08:00

## 2019-07-30 RX ADMIN — THIAMINE HCL TAB 100 MG 100 MG: 100 TAB at 07:59

## 2019-07-30 RX ADMIN — TRAZODONE HYDROCHLORIDE 50 MG: 50 TABLET ORAL at 21:43

## 2019-07-30 RX ADMIN — NICOTINE POLACRILEX 4 MG: 4 GUM, CHEWING ORAL at 21:42

## 2019-07-30 RX ADMIN — OLANZAPINE 10 MG: 10 TABLET, FILM COATED ORAL at 04:02

## 2019-07-30 RX ADMIN — NICOTINE POLACRILEX 4 MG: 4 GUM, CHEWING ORAL at 08:00

## 2019-07-30 RX ADMIN — LIDOCAINE 1 PATCH: 560 PATCH PERCUTANEOUS; TOPICAL; TRANSDERMAL at 21:43

## 2019-07-30 RX ADMIN — HYDROXYZINE HYDROCHLORIDE 50 MG: 50 TABLET, FILM COATED ORAL at 07:59

## 2019-07-30 RX ADMIN — FAMOTIDINE 20 MG: 20 TABLET, FILM COATED ORAL at 07:59

## 2019-07-30 RX ADMIN — GABAPENTIN 1000 MG: 400 CAPSULE ORAL at 21:42

## 2019-07-30 RX ADMIN — ACAMPROSATE CALCIUM 666 MG: 333 TABLET, DELAYED RELEASE ORAL at 07:59

## 2019-07-30 RX ADMIN — QUETIAPINE FUMARATE 50 MG: 50 TABLET ORAL at 10:59

## 2019-07-30 RX ADMIN — ACAMPROSATE CALCIUM 666 MG: 333 TABLET, DELAYED RELEASE ORAL at 21:42

## 2019-07-30 RX ADMIN — PROPRANOLOL HYDROCHLORIDE 10 MG: 10 TABLET ORAL at 20:16

## 2019-07-30 RX ADMIN — NICOTINE POLACRILEX 4 MG: 4 GUM, CHEWING ORAL at 14:02

## 2019-07-30 RX ADMIN — FOLIC ACID 1 MG: 1 TABLET ORAL at 07:59

## 2019-07-30 RX ADMIN — GABAPENTIN 1000 MG: 400 CAPSULE ORAL at 08:00

## 2019-07-30 RX ADMIN — OLANZAPINE 10 MG: 10 TABLET, FILM COATED ORAL at 14:02

## 2019-07-30 RX ADMIN — QUETIAPINE FUMARATE 50 MG: 50 TABLET ORAL at 20:16

## 2019-07-30 RX ADMIN — IBUPROFEN 600 MG: 600 TABLET ORAL at 14:02

## 2019-07-30 RX ADMIN — ACAMPROSATE CALCIUM 666 MG: 333 TABLET, DELAYED RELEASE ORAL at 14:02

## 2019-07-30 RX ADMIN — ESCITALOPRAM OXALATE 20 MG: 20 TABLET ORAL at 07:59

## 2019-07-30 RX ADMIN — MULTIPLE VITAMINS W/ MINERALS TAB 1 TABLET: TAB at 07:59

## 2019-07-30 RX ADMIN — NICOTINE POLACRILEX 4 MG: 4 GUM, CHEWING ORAL at 09:58

## 2019-07-30 RX ADMIN — FAMOTIDINE 20 MG: 20 TABLET, FILM COATED ORAL at 21:43

## 2019-07-30 RX ADMIN — GABAPENTIN 1000 MG: 400 CAPSULE ORAL at 14:01

## 2019-07-30 RX ADMIN — PROPRANOLOL HYDROCHLORIDE 10 MG: 10 TABLET ORAL at 07:59

## 2019-07-30 ASSESSMENT — ACTIVITIES OF DAILY LIVING (ADL)
DRESS: STREET CLOTHES;INDEPENDENT
ORAL_HYGIENE: INDEPENDENT
DRESS: INDEPENDENT
HYGIENE/GROOMING: INDEPENDENT
ORAL_HYGIENE: INDEPENDENT
LAUNDRY: WITH SUPERVISION
HYGIENE/GROOMING: INDEPENDENT

## 2019-07-30 NOTE — PLAN OF CARE
"  Problem: OT General Care Plan  Goal: OT Frequency  Description  Pt will practice using >2 coping strategies to manage stress and reduce symptoms to demonstrate increased readiness for discharge.     Attended 2/3 occupational therapy groups offered this date. Overall congruent affect and full engagement.    Wellness and coping skill based game: Movement [employer]. Education was provided on the sensory system, mind-body connection, and the use of movement and sensory strategies for self regulation. Pt Response: I to participate. Reported \"yoga saved my life.. I now practice it 3 times a day\" in the context of discussion on mind-body connection.   Occupational therapy clinic. Pt Response: Pts first time initiating a new task with great enthusiasm. Demonstrated consistent performance skills as observed on previous dates.    Outcome: Improving     "

## 2019-07-30 NOTE — PROGRESS NOTES
Patient given Zyprexa at 0400 this shift for anxiety and agitation.  States thinking the anxiety and agitation may be due to her being scheduled to see her  this morning.  Due to her scheduled pick-up for the  appointment, patient allowed to use the shower between 0645 and 0700 this shift.  She understands that normally she will have to wait until 0730 to use the shower.  Also asking to get soda from her locker at 0600.  Appears to understand that we cannot get soda from her locker before 0730 and accepts juice from the kitchen instead.

## 2019-07-30 NOTE — PLAN OF CARE
"48 hour assessment:  continues to describe mood as \"very good\".  C/o some anxiety, using Seroquel prn for anxiety.  Requesting to have increase in Seroquel dosing and Ibuprofen for c/o chronic pain.  On call MD contacted for these requests.  Has appointment with her , left for appointment at 1100.  Denies suicidal thoughts, thoughts of self harm and hallucinations.  1300  Returned from 's appointment.  denies suicidal thoughts.  Return search completed.  Reports meeting went well. States\"They are going to cancel my court hearing. I agreed to go into treatment and stay sober for 6 months\".  In good spirits.   "

## 2019-07-30 NOTE — PROGRESS NOTES
"   07/29/19 2200   Behavioral Health   Hallucinations denies / not responding to hallucinations   Thinking intact   Orientation person: oriented;place: oriented   Memory baseline memory   Insight insight appropriate to situation   Judgement impaired   Eye Contact at examiner   Affect full range affect   Mood mood is calm   Physical Appearance/Attire neat   Hygiene well groomed   Suicidality other (see comments)  (denies)   1. Wish to be Dead No   2. Non-Specific Active Suicidal Thoughts  No   Self Injury other (see comment)  (denies)   Activity other (see comment)  (visible in the milieu and socialized with others )   Speech clear;coherent   Activities of Daily Living   Hygiene/Grooming independent   Oral Hygiene independent   Dress independent   Laundry with supervision   Room Organization independent       Pt denied SI and SIB.  Pt ate supper, visible in the milieu watching tv and socialized with others.  Pt reported feeling anxious (6) \" I have an anxiety disorder.\"  Pt reported overall \" feeling well.\"  Pt reported good appetite and sleeping well.  Pt is independent with ADL's.  Pt wants visitors.    "

## 2019-07-30 NOTE — PROGRESS NOTES
The patient's referral to [facility] was sent yesterday by the CD . Writer will plan to follow up with the treatment facility at some point today.

## 2019-07-31 PROCEDURE — 12400001 ZZH R&B MH UMMC

## 2019-07-31 PROCEDURE — 25000132 ZZH RX MED GY IP 250 OP 250 PS 637: Performed by: PSYCHIATRY & NEUROLOGY

## 2019-07-31 PROCEDURE — 25000132 ZZH RX MED GY IP 250 OP 250 PS 637: Performed by: PHYSICIAN ASSISTANT

## 2019-07-31 PROCEDURE — 99232 SBSQ HOSP IP/OBS MODERATE 35: CPT | Performed by: PSYCHIATRY & NEUROLOGY

## 2019-07-31 PROCEDURE — G0177 OPPS/PHP; TRAIN & EDUC SERV: HCPCS

## 2019-07-31 PROCEDURE — H2032 ACTIVITY THERAPY, PER 15 MIN: HCPCS

## 2019-07-31 RX ADMIN — NICOTINE POLACRILEX 4 MG: 4 GUM, CHEWING ORAL at 09:57

## 2019-07-31 RX ADMIN — GABAPENTIN 1000 MG: 400 CAPSULE ORAL at 21:18

## 2019-07-31 RX ADMIN — FOLIC ACID 1 MG: 1 TABLET ORAL at 07:46

## 2019-07-31 RX ADMIN — FAMOTIDINE 20 MG: 20 TABLET, FILM COATED ORAL at 07:45

## 2019-07-31 RX ADMIN — ACAMPROSATE CALCIUM 666 MG: 333 TABLET, DELAYED RELEASE ORAL at 07:45

## 2019-07-31 RX ADMIN — MULTIPLE VITAMINS W/ MINERALS TAB 1 TABLET: TAB at 07:45

## 2019-07-31 RX ADMIN — FAMOTIDINE 20 MG: 20 TABLET, FILM COATED ORAL at 21:18

## 2019-07-31 RX ADMIN — TRAZODONE HYDROCHLORIDE 50 MG: 50 TABLET ORAL at 21:19

## 2019-07-31 RX ADMIN — QUETIAPINE FUMARATE 50 MG: 50 TABLET ORAL at 06:33

## 2019-07-31 RX ADMIN — MENTHOL 1 PATCH: 205.5 PATCH TOPICAL at 07:48

## 2019-07-31 RX ADMIN — IBUPROFEN 600 MG: 600 TABLET ORAL at 06:33

## 2019-07-31 RX ADMIN — DULOXETINE HYDROCHLORIDE 30 MG: 30 CAPSULE, DELAYED RELEASE ORAL at 07:46

## 2019-07-31 RX ADMIN — PROPRANOLOL HYDROCHLORIDE 10 MG: 10 TABLET ORAL at 21:18

## 2019-07-31 RX ADMIN — OLANZAPINE 10 MG: 10 TABLET, FILM COATED ORAL at 18:55

## 2019-07-31 RX ADMIN — NICOTINE POLACRILEX 4 MG: 4 GUM, CHEWING ORAL at 13:22

## 2019-07-31 RX ADMIN — THIAMINE HCL TAB 100 MG 100 MG: 100 TAB at 07:45

## 2019-07-31 RX ADMIN — ESCITALOPRAM OXALATE 20 MG: 20 TABLET ORAL at 07:46

## 2019-07-31 RX ADMIN — ACAMPROSATE CALCIUM 666 MG: 333 TABLET, DELAYED RELEASE ORAL at 21:18

## 2019-07-31 RX ADMIN — NICOTINE POLACRILEX 4 MG: 4 GUM, CHEWING ORAL at 17:58

## 2019-07-31 RX ADMIN — OLANZAPINE 10 MG: 10 TABLET, FILM COATED ORAL at 10:35

## 2019-07-31 RX ADMIN — ACAMPROSATE CALCIUM 666 MG: 333 TABLET, DELAYED RELEASE ORAL at 14:53

## 2019-07-31 RX ADMIN — NICOTINE POLACRILEX 4 MG: 4 GUM, CHEWING ORAL at 14:53

## 2019-07-31 RX ADMIN — PROPRANOLOL HYDROCHLORIDE 10 MG: 10 TABLET ORAL at 07:45

## 2019-07-31 RX ADMIN — GABAPENTIN 1000 MG: 400 CAPSULE ORAL at 14:53

## 2019-07-31 RX ADMIN — NICOTINE POLACRILEX 4 MG: 4 GUM, CHEWING ORAL at 07:46

## 2019-07-31 RX ADMIN — QUETIAPINE FUMARATE 50 MG: 50 TABLET ORAL at 14:53

## 2019-07-31 RX ADMIN — LIDOCAINE 1 PATCH: 560 PATCH PERCUTANEOUS; TOPICAL; TRANSDERMAL at 21:18

## 2019-07-31 RX ADMIN — NICOTINE POLACRILEX 4 MG: 4 GUM, CHEWING ORAL at 21:18

## 2019-07-31 RX ADMIN — GABAPENTIN 1000 MG: 400 CAPSULE ORAL at 07:45

## 2019-07-31 ASSESSMENT — ACTIVITIES OF DAILY LIVING (ADL)
ORAL_HYGIENE: INDEPENDENT
DRESS: STREET CLOTHES
DRESS: INDEPENDENT
HYGIENE/GROOMING: INDEPENDENT
LAUNDRY: WITH SUPERVISION
LAUNDRY: WITH SUPERVISION
ORAL_HYGIENE: INDEPENDENT
HYGIENE/GROOMING: INDEPENDENT

## 2019-07-31 NOTE — PROGRESS NOTES
Pt spent time in the lounge and groups this shift. She appears cooperative and pleasant on approach. She is waiting for placement. She denies suicidal ideation or depressed but expressed anxiety.     07/31/19 1434   Behavioral Health   Hallucinations denies / not responding to hallucinations   Thinking intact   Orientation time: oriented;date: oriented;place: oriented;person: oriented   Memory baseline memory   Insight insight appropriate to situation   Judgement impaired   Eye Contact at examiner   Affect full range affect   Mood mood is calm   Physical Appearance/Attire appears stated age   Hygiene well groomed   Suicidality other (see comments)  (Denies)   1. Wish to be Dead No   2. Non-Specific Active Suicidal Thoughts  No   Self Injury other (see comment)  (Denies)   Speech coherent;clear   Medication Sensitivity no stated side effects   Psychomotor / Gait balanced;steady   Psycho Education   Type of Intervention 1:1 intervention   Response participates, initiates socially appropriate   Hours 0.5   Activities of Daily Living   Hygiene/Grooming independent   Oral Hygiene independent   Dress street clothes   Laundry with supervision   Room Organization independent   Activity   Activity Assistance Provided independent

## 2019-07-31 NOTE — PROGRESS NOTES
"Pt denies SI/SIB and hallucinations. Pt was sleeping in the lounge for part of the shift, had a visitor, watched movies, and socialized with peers. Pt states the hospital \"should take us on trips\" and made similar comments that \"they're treating us like [age] year olds\". Pt stated she feels better since court and is hopeful for treatment.         07/30/19 3509   Behavioral Health   Hallucinations denies / not responding to hallucinations   Thinking intact   Orientation person: oriented;place: oriented;date: oriented;time: oriented   Memory baseline memory   Insight insight appropriate to situation   Judgement impaired   Eye Contact at examiner   Affect full range affect   Mood mood is calm   Physical Appearance/Attire neat;attire appropriate to age and situation   Hygiene well groomed   Suicidality other (see comments)  (denies)   1. Wish to be Dead No   2. Non-Specific Active Suicidal Thoughts  No   Self Injury other (see comment)  (denies)   Elopement   (none observed)   Activity other (see comment)  (visible in milieu)   Speech clear;coherent   Medication Sensitivity no observed side effects   Psychomotor / Gait balanced;steady   Activities of Daily Living   Hygiene/Grooming independent   Oral Hygiene independent   Dress independent   Laundry with supervision   Room Organization independent     "

## 2019-07-31 NOTE — PROGRESS NOTES
"[facility], [place]   Psychiatric Progress Note        Interim History:     The patient's care was discussed with the treatment team during the daily team meeting and/or staff's chart notes were reviewed.  Staff report patient spent more time outside of her room. She is very social and goes to groups. She is in a good and stable mood. Her  had visited her over weekend. She reports being in more pain after opiates were stopped, but dealing with this pain by using non controlled substances. She is waving her hearing on Aug 1 and hopes to go to [facility].          Medications:       acamprosate  666 mg Oral TID     DULoxetine  30 mg Oral Daily     escitalopram  20 mg Oral Daily     famotidine  20 mg Oral BID     folic acid  1 mg Oral Daily     gabapentin  1,000 mg Oral TID     lidocaine  1-3 patch Transdermal Q24H     lidocaine   Transdermal Q8H     lidocaine   Transdermal Q24h     menthol   Transdermal Q8H     multivitamin w/minerals  1 tablet Oral Daily     propranolol  10 mg Oral BID     vitamin B1  100 mg Oral Daily          Allergies:     Allergies   Allergen Reactions     Cranberry Extract Hives     Morphine Other (See Comments)     Vomiting     Penicillins Unknown     Sertraline Other (See Comments)     dreams     Unasyn      Celecoxib Rash     Sulfa Drugs Rash          Labs:     No results found for this or any previous visit (from the past 24 hour(s)).       Psychiatric Examination:     /78   Pulse 93   Temp 98.9  F (37.2  C) (Oral)   Resp 16   Ht 1.575 m (5' 2\")   Wt 71.1 kg (156 lb 12.8 oz)   SpO2 97%   BMI 28.68 kg/m    Weight is 156 lbs 12.8 oz  Body mass index is 28.68 kg/m .    Orthostatic Vitals       Most Recent      Sitting Orthostatic /89 07/29 0749    Sitting Orthostatic Pulse (bpm) 80 07/29 0749    Standing Orthostatic /87 07/29 0749    Standing Orthostatic Pulse (bpm) 91 07/29 0749         Appearance: awake, alert and " dressed in hospital scrubs  Attitude: cooperative  Eye Contact:  fair  Mood: better   Affect:  more animated today  Speech:  WNL  Psychomotor Behavior: no evidence of dystonia and intact station, gait and muscle tone  Throught Process:  goal oriented  Associations:  no loose associations  Thought Content:  no evidence of suicidal ideation or homicidal ideation  Insight:  limited, but improving   Judgement:  limited, but improving   Oriented to:  time, person, and place  Attention Span and Concentration:  fair  Recent and Remote Memory:  fair    Clinical Global Impressions  First: 7/4 7/18/2019      Most recent: 3/2 7/31/2019             Precautions:     Behavioral Orders   Procedures     Code 1 - Restrict to Unit     Elopement precautions     Routine Programming     As clinically indicated     Status 15     Every 15 minutes.     Suicide precautions     Patients on Suicide Precautions should have a Combination Diet ordered that includes a Diet selection(s) AND a Behavioral Tray selection for Safe Tray - with utensils, or Safe Tray - NO utensils       Withdrawal precautions          DIagnoses:     1.  Major depressive disorder, recurrent, moderate severity.   2.  Generalized anxiety disorder.   3.  History of panic disorder with agoraphobia.   4.  Alcohol use disorder, severe, with dependence.     5.  Recent alcohol withdrawal symptoms.     6.  Liver cirrhosis.          Plan:   She had CD assessment. Tapered off Lorazepam and opiates. No medication changes today.  Will continue to provide support and structure. She waived her right for the 2nd hearing. She is on a Stay off Commitment. Was referred to [facility].

## 2019-07-31 NOTE — PLAN OF CARE
Problem: OT General Care Plan  Goal: OT Frequency  Description  Pt will practice using >2 coping strategies to manage stress and reduce symptoms to demonstrate increased readiness for discharge.     Attended 2/2 occupational therapy groups offered this date. Overall bright affect and full engagement.      Leisure group: Planting/Gardening. Facilitated discussion on gardening and plant maintenance as a symbolic representation of self-cares, routine development, and life balance. Hands-on activity use for role fulfillment, creative expression, sensory integration, and appropriate sequencing of task. Pt Response: Engaged within group discussion. Reported great desire to go outside. Thoughtful during hands-on activity as she included detail related to recovery on decoration of personal pot. I to sequence entire task w/o sensory aversion to soil.     Outcome: Improving

## 2019-08-01 PROCEDURE — G0177 OPPS/PHP; TRAIN & EDUC SERV: HCPCS

## 2019-08-01 PROCEDURE — 12400001 ZZH R&B MH UMMC

## 2019-08-01 PROCEDURE — 99232 SBSQ HOSP IP/OBS MODERATE 35: CPT | Performed by: PSYCHIATRY & NEUROLOGY

## 2019-08-01 PROCEDURE — 25000132 ZZH RX MED GY IP 250 OP 250 PS 637: Performed by: PSYCHIATRY & NEUROLOGY

## 2019-08-01 PROCEDURE — 25000132 ZZH RX MED GY IP 250 OP 250 PS 637: Performed by: PHYSICIAN ASSISTANT

## 2019-08-01 RX ADMIN — IBUPROFEN 600 MG: 600 TABLET ORAL at 04:58

## 2019-08-01 RX ADMIN — PROPRANOLOL HYDROCHLORIDE 10 MG: 10 TABLET ORAL at 21:29

## 2019-08-01 RX ADMIN — OLANZAPINE 10 MG: 10 TABLET, FILM COATED ORAL at 12:16

## 2019-08-01 RX ADMIN — NICOTINE POLACRILEX 4 MG: 4 GUM, CHEWING ORAL at 09:34

## 2019-08-01 RX ADMIN — QUETIAPINE FUMARATE 50 MG: 50 TABLET ORAL at 16:16

## 2019-08-01 RX ADMIN — IBUPROFEN 600 MG: 600 TABLET ORAL at 16:16

## 2019-08-01 RX ADMIN — FAMOTIDINE 20 MG: 20 TABLET, FILM COATED ORAL at 07:54

## 2019-08-01 RX ADMIN — ESCITALOPRAM OXALATE 20 MG: 20 TABLET ORAL at 07:54

## 2019-08-01 RX ADMIN — ACAMPROSATE CALCIUM 666 MG: 333 TABLET, DELAYED RELEASE ORAL at 21:28

## 2019-08-01 RX ADMIN — LIDOCAINE 1 PATCH: 560 PATCH PERCUTANEOUS; TOPICAL; TRANSDERMAL at 21:29

## 2019-08-01 RX ADMIN — DULOXETINE HYDROCHLORIDE 30 MG: 30 CAPSULE, DELAYED RELEASE ORAL at 07:53

## 2019-08-01 RX ADMIN — ACAMPROSATE CALCIUM 666 MG: 333 TABLET, DELAYED RELEASE ORAL at 07:53

## 2019-08-01 RX ADMIN — GABAPENTIN 1000 MG: 400 CAPSULE ORAL at 13:27

## 2019-08-01 RX ADMIN — FAMOTIDINE 20 MG: 20 TABLET, FILM COATED ORAL at 21:29

## 2019-08-01 RX ADMIN — OLANZAPINE 10 MG: 10 TABLET, FILM COATED ORAL at 19:24

## 2019-08-01 RX ADMIN — GABAPENTIN 1000 MG: 400 CAPSULE ORAL at 21:28

## 2019-08-01 RX ADMIN — QUETIAPINE FUMARATE 50 MG: 50 TABLET ORAL at 04:59

## 2019-08-01 RX ADMIN — PROPRANOLOL HYDROCHLORIDE 10 MG: 10 TABLET ORAL at 07:54

## 2019-08-01 RX ADMIN — TRAZODONE HYDROCHLORIDE 50 MG: 50 TABLET ORAL at 21:29

## 2019-08-01 RX ADMIN — GABAPENTIN 1000 MG: 400 CAPSULE ORAL at 07:53

## 2019-08-01 RX ADMIN — MULTIPLE VITAMINS W/ MINERALS TAB 1 TABLET: TAB at 07:54

## 2019-08-01 RX ADMIN — NICOTINE POLACRILEX 4 MG: 4 GUM, CHEWING ORAL at 16:18

## 2019-08-01 RX ADMIN — THIAMINE HCL TAB 100 MG 100 MG: 100 TAB at 07:54

## 2019-08-01 RX ADMIN — NICOTINE POLACRILEX 4 MG: 4 GUM, CHEWING ORAL at 19:24

## 2019-08-01 RX ADMIN — FOLIC ACID 1 MG: 1 TABLET ORAL at 07:53

## 2019-08-01 RX ADMIN — NICOTINE POLACRILEX 4 MG: 4 GUM, CHEWING ORAL at 21:29

## 2019-08-01 RX ADMIN — ACAMPROSATE CALCIUM 666 MG: 333 TABLET, DELAYED RELEASE ORAL at 13:27

## 2019-08-01 RX ADMIN — NICOTINE POLACRILEX 4 MG: 4 GUM, CHEWING ORAL at 07:53

## 2019-08-01 ASSESSMENT — ACTIVITIES OF DAILY LIVING (ADL)
DRESS: INDEPENDENT
LAUNDRY: WITH SUPERVISION
ORAL_HYGIENE: INDEPENDENT
HYGIENE/GROOMING: INDEPENDENT

## 2019-08-01 ASSESSMENT — MIFFLIN-ST. JEOR: SCORE: 1352.16

## 2019-08-01 NOTE — PROGRESS NOTES
called the patient's , Wes ([phone]) to get information about the patient's court appearance on 7/30. Wes stated that he would have to coordinate with his supervisor and give writer a call back.  provided her contact information for a return call.      called [facility] to get paperwork and information about the patient's hearing that was on 7/30.  was told that the patient was scheduled to appear in court today but her  cancelled.  was told that the hearing will still go on without the patient and the paperwork will be sent following that.

## 2019-08-01 NOTE — PROGRESS NOTES
"   07/31/19 2100   General Information   Art Directive other (see comments)   Pts were give the option of completing one of two AT directives: the bridge drawing assessment or drawing a road to recovery/wellness. Goals of directives: to assess pts motivation for change, to identify personal goals and strengths. Pt was a positive participant, focused on task for the full duration of group. Pt completed a bridge drawing and briefly shared with group. Pt debby a large bridge with a solid structure and supports. Pt debby herself towards the end of the bridge and shared that she is \"driving off into the sunset.\" Pt at times tried to act as therapist with other pts, but was overally an active, supportive group member. Pt had bright affect, mood was calm.  "

## 2019-08-01 NOTE — PROGRESS NOTES
Behavioral Health  Note   Behavioral Health  Spirituality Group Note     Unit 20    Name: Lima Renteria    YOB: 1975   MRN: [id number]    Age: 43 year old     Patient attended -led group, which included discussion of spirituality, coping with illness and building resilience.   Patient attended group for 1.0 hrs.   The patient actively participated in group discussion     [facility]  Staff    Page [phone]

## 2019-08-01 NOTE — PROGRESS NOTES
received a copy of the patient's court paperwork.  provided a copy to the patient and gave her an update letting her know that her referral is still being reviewed. A copy of the patient's court paperwork has been placed in the HIM to be scanned in and in the patient's paper chart.      received a voicemail from Jillian with [facility]. Jillian requested additional documentation for the patient, including a MAR, progress notes, and court paperwork.  faxed this documentation to Jillian.      received a voicemail from Claritza Belcher, supervisor for R ([phone]) stating that she would like to visit with the patient on Monday between 9:30am and 10am with the patient's assigned . Claritza requested a return call to confirm that this date and time will work for the patient.

## 2019-08-01 NOTE — PLAN OF CARE
Pt presented with a bright affect and was visible in the milieu socializing w/peers and attended all groups today. The plan is for the pt to go to [facility]. Pt is currently on stay of commitment. Pt denies SI/SIB or hallucinations. Pt received PRN Zyprexa at 1216.

## 2019-08-01 NOTE — PROGRESS NOTES
07/31/19 2300   Behavioral Health   Hallucinations denies / not responding to hallucinations   Thinking intact   Orientation person: oriented;place: oriented   Memory baseline memory   Insight insight appropriate to situation   Judgement impaired   Eye Contact at examiner   Affect full range affect   Mood mood is calm   Physical Appearance/Attire neat   Hygiene well groomed   Suicidality other (see comments)  (denies)   1. Wish to be Dead No   2. Non-Specific Active Suicidal Thoughts  No   Self Injury other (see comment)  (denies)   Activity other (see comment)  (visible in the milieu )   Speech clear;coherent   Activities of Daily Living   Hygiene/Grooming independent   Oral Hygiene independent   Dress independent   Laundry with supervision   Room Organization independent       Pt denied SI and SIB.  Pt ate supper, visible in the milieu and socialized with others.

## 2019-08-02 PROCEDURE — 25000132 ZZH RX MED GY IP 250 OP 250 PS 637: Performed by: NURSE PRACTITIONER

## 2019-08-02 PROCEDURE — H2032 ACTIVITY THERAPY, PER 15 MIN: HCPCS

## 2019-08-02 PROCEDURE — 25000132 ZZH RX MED GY IP 250 OP 250 PS 637: Performed by: PSYCHIATRY & NEUROLOGY

## 2019-08-02 PROCEDURE — 99232 SBSQ HOSP IP/OBS MODERATE 35: CPT | Performed by: PSYCHIATRY & NEUROLOGY

## 2019-08-02 PROCEDURE — 25000132 ZZH RX MED GY IP 250 OP 250 PS 637: Performed by: PHYSICIAN ASSISTANT

## 2019-08-02 PROCEDURE — 12400001 ZZH R&B MH UMMC

## 2019-08-02 PROCEDURE — G0177 OPPS/PHP; TRAIN & EDUC SERV: HCPCS

## 2019-08-02 RX ADMIN — FAMOTIDINE 20 MG: 20 TABLET, FILM COATED ORAL at 22:32

## 2019-08-02 RX ADMIN — OLANZAPINE 10 MG: 10 TABLET, FILM COATED ORAL at 18:54

## 2019-08-02 RX ADMIN — FAMOTIDINE 20 MG: 20 TABLET, FILM COATED ORAL at 08:42

## 2019-08-02 RX ADMIN — ESCITALOPRAM OXALATE 20 MG: 20 TABLET ORAL at 08:41

## 2019-08-02 RX ADMIN — ACAMPROSATE CALCIUM 666 MG: 333 TABLET, DELAYED RELEASE ORAL at 13:43

## 2019-08-02 RX ADMIN — GABAPENTIN 1000 MG: 400 CAPSULE ORAL at 13:43

## 2019-08-02 RX ADMIN — ACAMPROSATE CALCIUM 666 MG: 333 TABLET, DELAYED RELEASE ORAL at 08:42

## 2019-08-02 RX ADMIN — QUETIAPINE FUMARATE 50 MG: 50 TABLET ORAL at 13:43

## 2019-08-02 RX ADMIN — FOLIC ACID 1 MG: 1 TABLET ORAL at 08:42

## 2019-08-02 RX ADMIN — NICOTINE POLACRILEX 4 MG: 4 GUM, CHEWING ORAL at 22:31

## 2019-08-02 RX ADMIN — HYDROXYZINE HYDROCHLORIDE 50 MG: 50 TABLET, FILM COATED ORAL at 22:31

## 2019-08-02 RX ADMIN — TRAZODONE HYDROCHLORIDE 50 MG: 50 TABLET ORAL at 22:31

## 2019-08-02 RX ADMIN — ACAMPROSATE CALCIUM 666 MG: 333 TABLET, DELAYED RELEASE ORAL at 22:32

## 2019-08-02 RX ADMIN — IBUPROFEN 600 MG: 600 TABLET ORAL at 04:59

## 2019-08-02 RX ADMIN — PROPRANOLOL HYDROCHLORIDE 10 MG: 10 TABLET ORAL at 08:42

## 2019-08-02 RX ADMIN — DULOXETINE HYDROCHLORIDE 30 MG: 30 CAPSULE, DELAYED RELEASE ORAL at 08:42

## 2019-08-02 RX ADMIN — QUETIAPINE FUMARATE 50 MG: 50 TABLET ORAL at 04:59

## 2019-08-02 RX ADMIN — LIDOCAINE 1 PATCH: 560 PATCH PERCUTANEOUS; TOPICAL; TRANSDERMAL at 22:31

## 2019-08-02 RX ADMIN — NICOTINE POLACRILEX 4 MG: 4 GUM, CHEWING ORAL at 09:31

## 2019-08-02 RX ADMIN — NICOTINE POLACRILEX 4 MG: 4 GUM, CHEWING ORAL at 18:54

## 2019-08-02 RX ADMIN — THIAMINE HCL TAB 100 MG 100 MG: 100 TAB at 08:41

## 2019-08-02 RX ADMIN — GABAPENTIN 1000 MG: 400 CAPSULE ORAL at 22:31

## 2019-08-02 RX ADMIN — OLANZAPINE 10 MG: 10 TABLET, FILM COATED ORAL at 08:42

## 2019-08-02 RX ADMIN — NICOTINE POLACRILEX 4 MG: 4 GUM, CHEWING ORAL at 16:24

## 2019-08-02 RX ADMIN — PROPRANOLOL HYDROCHLORIDE 10 MG: 10 TABLET ORAL at 22:32

## 2019-08-02 RX ADMIN — GABAPENTIN 1000 MG: 400 CAPSULE ORAL at 08:41

## 2019-08-02 RX ADMIN — HYDROXYZINE HYDROCHLORIDE 50 MG: 50 TABLET, FILM COATED ORAL at 14:17

## 2019-08-02 RX ADMIN — MULTIPLE VITAMINS W/ MINERALS TAB 1 TABLET: TAB at 08:45

## 2019-08-02 RX ADMIN — IBUPROFEN 600 MG: 600 TABLET ORAL at 13:43

## 2019-08-02 ASSESSMENT — ACTIVITIES OF DAILY LIVING (ADL)
HYGIENE/GROOMING: INDEPENDENT
DRESS: INDEPENDENT
HYGIENE/GROOMING: INDEPENDENT
LAUNDRY: WITH SUPERVISION
DRESS: INDEPENDENT
ORAL_HYGIENE: INDEPENDENT
ORAL_HYGIENE: INDEPENDENT

## 2019-08-02 NOTE — PROGRESS NOTES
"[facility], [place]   Psychiatric Progress Note        Interim History:     The patient's care was discussed with the treatment team during the daily team meeting and/or staff's chart notes were reviewed.  Staff report patient is social and visible. Even, when she doesn't talk to people, she sits at the patients' lounge and reads. Says that her  visits her often and is supportive. Reports less abdominal pain. She denies Suicidal ideation and hopes to go to [facility]. She had no further concerns or complaints.    For more details about patient's placement, please see [facility]'s note below: \"Writer received a copy of the patient's court paperwork. Writer provided a copy to the patient and gave her an update letting her know that her referral is still being reviewed. A copy of the patient's court paperwork has been placed in the HIM to be scanned in and in the patient's paper chart.       received a voicemail from Jillian with [facility]. Jillian requested additional documentation for the patient, including a MAR, progress notes, and court paperwork. Roseannr faxed this documentation to Jillian.       received a voicemail from Claritza Belcher, supervisor for R ([phone]) stating that she would like to visit with the patient on Monday between 9:30am and 10am with the patient's assigned . Claritza requested a return call to confirm that this date and time will work for the patient.          Medications:       acamprosate  666 mg Oral TID     DULoxetine  30 mg Oral Daily     escitalopram  20 mg Oral Daily     famotidine  20 mg Oral BID     folic acid  1 mg Oral Daily     gabapentin  1,000 mg Oral TID     lidocaine  1-3 patch Transdermal Q24H     lidocaine   Transdermal Q8H     lidocaine   Transdermal Q24h     menthol   Transdermal Q8H     multivitamin w/minerals  1 tablet Oral Daily     propranolol  10 mg Oral BID     vitamin B1  100 mg Oral Daily          " "Allergies:     Allergies   Allergen Reactions     Cranberry Extract Hives     Morphine Other (See Comments)     Vomiting     Penicillins Unknown     Sertraline Other (See Comments)     dreams     Unasyn      Celecoxib Rash     Sulfa Drugs Rash          Labs:     No results found for this or any previous visit (from the past 24 hour(s)).       Psychiatric Examination:     /74   Pulse 87   Temp 97.7  F (36.5  C) (Oral)   Resp 16   Ht 1.575 m (5' 2\")   Wt 73.9 kg (162 lb 14.4 oz)   SpO2 100%   BMI 29.79 kg/m    Weight is 162 lbs 14.4 oz  Body mass index is 29.79 kg/m .    Orthostatic Vitals       Most Recent      Sitting Orthostatic /81 08/01 1632    Sitting Orthostatic Pulse (bpm) 80 08/01 1632    Standing Orthostatic /85 08/01 0743    Standing Orthostatic Pulse (bpm) 77 08/01 0743         Appearance: awake, alert and dressed in hospital scrubs  Attitude: cooperative  Eye Contact:  fair  Mood: better   Affect:  more animated today  Speech:  WNL  Psychomotor Behavior: no evidence of dystonia and intact station, gait and muscle tone  Throught Process:  goal oriented  Associations:  no loose associations  Thought Content:  no evidence of suicidal ideation or homicidal ideation  Insight:  limited, but improving   Judgement:  limited, but improving   Oriented to:  time, person, and place  Attention Span and Concentration:  fair  Recent and Remote Memory:  fair    Clinical Global Impressions  First: 7/4 7/18/2019      Most recent: 3/2 7/31/2019             Precautions:     Behavioral Orders   Procedures     Code 1 - Restrict to Unit     Elopement precautions     Routine Programming     As clinically indicated     Status 15     Every 15 minutes.     Suicide precautions     Patients on Suicide Precautions should have a Combination Diet ordered that includes a Diet selection(s) AND a Behavioral Tray selection for Safe Tray - with utensils, or Safe Tray - NO utensils       Withdrawal precautions         "  DIagnoses:     1.  Major depressive disorder, recurrent, moderate severity.   2.  Generalized anxiety disorder.   3.  History of panic disorder with agoraphobia.   4.  Alcohol use disorder, severe, with dependence.     5.  Recent alcohol withdrawal symptoms.     6.  Liver cirrhosis.          Plan:   She had CD assessment. Tapered off Lorazepam and opiates. No medication changes today.  Will continue to provide support and structure. She is on a Stay off Commitment. Was referred to [facility].

## 2019-08-02 NOTE — PROGRESS NOTES
Writer spoke with the patient and gave her an update about the wait for treatment. The patient stated that she really does not want to wait that long and inquired about other facilities for treatment. Writer stated that she will send the patient's referral out. The patient then inquired about being able to get off the unit and go outside. Writer informed her that that is not typically permitted unless a patient is, for example, going to tour and interview for a group home/IRTs facility. The patient was adamant about being able to get off the unit. Writer re-iterated the rules for the unit and encouraged the patient the patient to speak with the unit manager if she had further questions.

## 2019-08-02 NOTE — PROGRESS NOTES
called Jillian with [facility] ([phone]) to inquire about if she received the patient's documentation that she requested. Jillian stated that she did and the patient was approved for [facility]. Jillian stated that the patient has been added to the wait-list which currently has a 10 day wait.     Writer Mukherjee ([phone]) who is the admissions coordinator for [facility].  left a voicemail for Yaz inquiring about the wait for admission.  provided her contact information and requested a return call.

## 2019-08-02 NOTE — PROGRESS NOTES
08/02/19 1500   Occupational Therapy   Type of Intervention structured groups   Response Initiates, socially acceptable   Hours 2     Somewhat hyperthymic with great expression. Mildly distractible and disorganized.

## 2019-08-02 NOTE — PROGRESS NOTES
Addendum               1. What PRN did patient receive? Patient requested Ibuprofen and Seroquel      2. What was the patient doing that led to the PRN medication?  Patient c/o joint pain and anxiety.     3. Did they require R/S? N/A       4. Side effects to PRN medication?  Denies      5. After 1 Hour, patient appeared:  Pending.

## 2019-08-02 NOTE — PROGRESS NOTES
08/02/19 1218   Behavioral Health   Hallucinations denies / not responding to hallucinations   Thinking intact   Orientation person: oriented;date: oriented;place: oriented   Memory baseline memory   Insight admits / accepts   Judgement intact   Eye Contact at examiner   Affect full range affect   Mood mood is calm   Physical Appearance/Attire attire appropriate to age and situation   Hygiene well groomed   Suicidality other (see comments)  (denies)   1. Wish to be Dead No   2. Non-Specific Active Suicidal Thoughts  No   Self Injury other (see comment)  (denies)   Elopement   (nothing to report)   Activity other (see comment)  (social and attending groups)   Speech clear;coherent   Medication Sensitivity no observed side effects   Psychomotor / Gait balanced;steady   Psycho Education   Type of Intervention 1:1 intervention   Response participates, initiates socially appropriate   Hours 0.5   Treatment Detail   (check in)   Activities of Daily Living   Hygiene/Grooming independent   Oral Hygiene independent   Dress independent   Room Organization independent   The patient was social with peers this shift and was attending all groups.  The patient was engaged in the activities on the unit and was out of her room most of the shift.  The patient showered this shift and was pleasant with peers and staff.  The patient denies SI and SiB.  The patient is reporting feeling ready to move on to her next step, which is CD treatment.  The patient reports that everything is in place and she is just waiting for a bed to open up.

## 2019-08-02 NOTE — PROGRESS NOTES
"[facility], [place]   Psychiatric Progress Note        Interim History:     The patient's care was discussed with the treatment team during the daily team meeting and/or staff's chart notes were reviewed.  Staff report patient is social and visible. Even, when she doesn't talk to people, she sits at the patients' lounge and reads. Says that her  visits her often and is supportive. Reports no abdominal pain. Denies Suicidal ideation, says she looks forward to going to CD program. After visit with me she met with [facility] who informed Lima that she was added to a waiting list for [facility] which is 10 days. Lima sounded somewhat disappointed with a long wait, then, said that she would request to go for a short time home before going to CD treatment.          Medications:       acamprosate  666 mg Oral TID     DULoxetine  30 mg Oral Daily     escitalopram  20 mg Oral Daily     famotidine  20 mg Oral BID     folic acid  1 mg Oral Daily     gabapentin  1,000 mg Oral TID     lidocaine  1-3 patch Transdermal Q24H     lidocaine   Transdermal Q8H     lidocaine   Transdermal Q24h     menthol   Transdermal Q8H     multivitamin w/minerals  1 tablet Oral Daily     propranolol  10 mg Oral BID     vitamin B1  100 mg Oral Daily          Allergies:     Allergies   Allergen Reactions     Cranberry Extract Hives     Morphine Other (See Comments)     Vomiting     Penicillins Unknown     Sertraline Other (See Comments)     dreams     Unasyn      Celecoxib Rash     Sulfa Drugs Rash          Labs:     No results found for this or any previous visit (from the past 24 hour(s)).       Psychiatric Examination:     /74   Pulse 87   Temp 98.8  F (37.1  C) (Oral)   Resp 18   Ht 1.575 m (5' 2\")   Wt 73.9 kg (162 lb 14.4 oz)   SpO2 100%   BMI 29.79 kg/m    Weight is 162 lbs 14.4 oz  Body mass index is 29.79 kg/m .    Orthostatic Vitals       Most Recent      Sitting Orthostatic /84 08/02 0850    " Sitting Orthostatic Pulse (bpm) 93 08/02 0850    Standing Orthostatic /90 08/02 0850    Standing Orthostatic Pulse (bpm) 80 08/02 0850         Appearance: awake, alert and dressed in hospital scrubs  Attitude: cooperative  Eye Contact:  fair  Mood: better   Affect:  more animated today  Speech:  WNL  Psychomotor Behavior: no evidence of dystonia and intact station, gait and muscle tone  Throught Process:  goal oriented  Associations:  no loose associations  Thought Content:  no evidence of suicidal ideation or homicidal ideation  Insight:  limited, but improving   Judgement:  limited, but improving   Oriented to:  time, person, and place  Attention Span and Concentration:  fair  Recent and Remote Memory:  fair    Clinical Global Impressions  First: 7/4 7/18/2019      Most recent: 3/2 7/31/2019             Precautions:     Behavioral Orders   Procedures     Code 1 - Restrict to Unit     Elopement precautions     Routine Programming     As clinically indicated     Status 15     Every 15 minutes.     Suicide precautions     Patients on Suicide Precautions should have a Combination Diet ordered that includes a Diet selection(s) AND a Behavioral Tray selection for Safe Tray - with utensils, or Safe Tray - NO utensils       Withdrawal precautions          DIagnoses:     1.  Major depressive disorder, recurrent, moderate severity.   2.  Generalized anxiety disorder.   3.  History of panic disorder with agoraphobia.   4.  Alcohol use disorder, severe, with dependence.     5.  Recent alcohol withdrawal symptoms.     6.  Liver cirrhosis.          Plan:   No medication changes today. Will continue to provide support and structure. She is on a Stay off Commitment. Was referred to [facility] and put on a waiting list.

## 2019-08-03 PROCEDURE — 25000132 ZZH RX MED GY IP 250 OP 250 PS 637: Performed by: NURSE PRACTITIONER

## 2019-08-03 PROCEDURE — 25000132 ZZH RX MED GY IP 250 OP 250 PS 637: Performed by: PSYCHIATRY & NEUROLOGY

## 2019-08-03 PROCEDURE — 25000132 ZZH RX MED GY IP 250 OP 250 PS 637: Performed by: PHYSICIAN ASSISTANT

## 2019-08-03 PROCEDURE — 12400001 ZZH R&B MH UMMC

## 2019-08-03 RX ADMIN — ACAMPROSATE CALCIUM 666 MG: 333 TABLET, DELAYED RELEASE ORAL at 09:31

## 2019-08-03 RX ADMIN — NICOTINE POLACRILEX 4 MG: 4 GUM, CHEWING ORAL at 17:13

## 2019-08-03 RX ADMIN — FOLIC ACID 1 MG: 1 TABLET ORAL at 09:31

## 2019-08-03 RX ADMIN — DULOXETINE HYDROCHLORIDE 30 MG: 30 CAPSULE, DELAYED RELEASE ORAL at 09:30

## 2019-08-03 RX ADMIN — PROPRANOLOL HYDROCHLORIDE 10 MG: 10 TABLET ORAL at 21:03

## 2019-08-03 RX ADMIN — GABAPENTIN 1000 MG: 400 CAPSULE ORAL at 09:30

## 2019-08-03 RX ADMIN — ACAMPROSATE CALCIUM 666 MG: 333 TABLET, DELAYED RELEASE ORAL at 14:37

## 2019-08-03 RX ADMIN — OLANZAPINE 10 MG: 10 TABLET, FILM COATED ORAL at 13:14

## 2019-08-03 RX ADMIN — ACAMPROSATE CALCIUM 666 MG: 333 TABLET, DELAYED RELEASE ORAL at 21:03

## 2019-08-03 RX ADMIN — IBUPROFEN 600 MG: 600 TABLET ORAL at 06:32

## 2019-08-03 RX ADMIN — THIAMINE HCL TAB 100 MG 100 MG: 100 TAB at 09:31

## 2019-08-03 RX ADMIN — PROPRANOLOL HYDROCHLORIDE 10 MG: 10 TABLET ORAL at 09:31

## 2019-08-03 RX ADMIN — TRAZODONE HYDROCHLORIDE 50 MG: 50 TABLET ORAL at 21:04

## 2019-08-03 RX ADMIN — FAMOTIDINE 20 MG: 20 TABLET, FILM COATED ORAL at 21:03

## 2019-08-03 RX ADMIN — MENTHOL 1 PATCH: 205.5 PATCH TOPICAL at 09:34

## 2019-08-03 RX ADMIN — QUETIAPINE FUMARATE 50 MG: 50 TABLET ORAL at 17:13

## 2019-08-03 RX ADMIN — NICOTINE POLACRILEX 4 MG: 4 GUM, CHEWING ORAL at 09:30

## 2019-08-03 RX ADMIN — FAMOTIDINE 20 MG: 20 TABLET, FILM COATED ORAL at 09:31

## 2019-08-03 RX ADMIN — MULTIPLE VITAMINS W/ MINERALS TAB 1 TABLET: TAB at 09:31

## 2019-08-03 RX ADMIN — GABAPENTIN 1000 MG: 400 CAPSULE ORAL at 14:37

## 2019-08-03 RX ADMIN — LIDOCAINE 1 PATCH: 560 PATCH PERCUTANEOUS; TOPICAL; TRANSDERMAL at 21:04

## 2019-08-03 RX ADMIN — NICOTINE POLACRILEX 4 MG: 4 GUM, CHEWING ORAL at 19:10

## 2019-08-03 RX ADMIN — GABAPENTIN 1000 MG: 400 CAPSULE ORAL at 21:03

## 2019-08-03 RX ADMIN — NICOTINE POLACRILEX 4 MG: 4 GUM, CHEWING ORAL at 13:14

## 2019-08-03 RX ADMIN — NICOTINE POLACRILEX 4 MG: 4 GUM, CHEWING ORAL at 21:04

## 2019-08-03 RX ADMIN — QUETIAPINE FUMARATE 50 MG: 50 TABLET ORAL at 22:42

## 2019-08-03 RX ADMIN — QUETIAPINE FUMARATE 50 MG: 50 TABLET ORAL at 06:32

## 2019-08-03 RX ADMIN — ESCITALOPRAM OXALATE 20 MG: 20 TABLET ORAL at 09:30

## 2019-08-03 RX ADMIN — HYDROXYZINE HYDROCHLORIDE 50 MG: 50 TABLET, FILM COATED ORAL at 19:10

## 2019-08-03 RX ADMIN — HYDROXYZINE HYDROCHLORIDE 50 MG: 50 TABLET, FILM COATED ORAL at 09:30

## 2019-08-03 ASSESSMENT — ACTIVITIES OF DAILY LIVING (ADL)
LAUNDRY: WITH SUPERVISION
HYGIENE/GROOMING: INDEPENDENT
DRESS: STREET CLOTHES;INDEPENDENT
ORAL_HYGIENE: INDEPENDENT

## 2019-08-03 NOTE — PROGRESS NOTES
08/02/19 2100   Therapeutic Recreation   Type of Intervention structured groups   Activity game   Response Participates with cues/redirection   Hours 1     Pt participated in Therapeutic Recreation group with focus on leisure participation, social engagement, and strategic cognitive reasoning.  Engaged and cooperative in group recreational intervention via a group game.  Pt was a full participant throughout the entire duration of group. Showed progress in session goals. Pt mood was calm. Pt seemed to get distracted during the activity, where she would miss some things for the activity. Pt was encouraging another peer to join, but her delivery was a bit rash. Pt interacted appropriately with peers during the group.

## 2019-08-03 NOTE — PLAN OF CARE
"  Problem: Suicidal Behavior  Goal: Suicidal Behavior is Absent or Managed  8/3/2019 1034 by Eli Donovan, RN  Outcome: No Change     NURSING ASSESSMENT    MENTAL HEALTH  Pt denies SI/SIB/hallucinations.  Pt endorses anxiety and sadness. Pt misses her children and was tearful. Pt is looking forward to seeing her family later this evening. Visible in milieu, social with peers, coloring, and attending groups. Limited insight and judgment. Pt does make a lot of statements about staff saying she can do certain things. For example, see RN TC's progress note on 8/3/19. For this shift, pt asked writer for \"zyrtec,\" which is not on her eMAR.  Then pt recalled the name zyprexa and stated \"I've been getting it (zyprexa) everyday.\"     Appetite = ate majority of meals    Sleep = 7 hours    MEDICAL CONCERNS  Pain = back of neck - applied hot icy patch    MSSA= 4    PRNS this shift  Hydroxyzine 50mg for anxiety, reported it helps  Zyprexa 10mg for agitation  Nicotine gum 4mg x 2    VITAL SIGNS     08/03/19 0815   Vital Signs   Temp 98.5  F (36.9  C)   Temp src Oral   Resp 16   Pulse/Heart Rate Source Monitor   Sitting Orthostatic BP   Sitting Orthostatic /76   Sitting Orthostatic Pulse 92 bpm   Standing Orthostatic BP   Standing Orthostatic /76   Standing Orthostatic Pulse 97 bpm   Pain/Comfort   Patient Currently in Pain yes   Preferred Pain Scale CAPA (Group)   0-10 Pain Scale 6   Pain Body Location - Side Right   Pain Body Location abdomen   Quality aching     Plan  Will continue with plan of care.  Provided education on antipsychotic Zyprexa and also offered alternative PRNs for her agitation/anxiety, to which pt refused.  "

## 2019-08-03 NOTE — PROGRESS NOTES
Lima appeared at the desk at 0600 this morning stating that management had told her she would be able to go in to her locker at any time to retrieve a Diet Coke and that Lima would be able to take a shower at any time of the day or night.  She was very argumentative that management told her she could do these things and that management was upset with staff that staff did not allow Lima in to her locker for soda and did not allow Lima to take a shower at any time (except on occasions such as early discharge or early court transport).      Night staff patiently explained to Lima that lockers are not entered in to during the night shift and that showers are not begun until 0730 due to staffing issues and noise.  (Again, exceptions to this listed above).    Lima remained very argumentative about the above but is now sitting in the lounge having juice and coloring.

## 2019-08-03 NOTE — PROGRESS NOTES
"Per CTC's [facility] note on 7/18/19:    \"Family/Living Situation: The patient reported that she is currently living at home with her  and four children (ages 18, 15, 12, 9).\"    Pt has new patient care order on 8/3/19 for 10 year old to visit on unit.   "

## 2019-08-03 NOTE — PLAN OF CARE
"Pt was visible in the milieu, social with others. Pt was coloring and watching tv. Full range affect. Denied SI/SIB/AH/VH. Pt reported anxiety \"7\" related to ot knowing if she will be able to go to the treatment facility she was hoping for. Pt reported that she is hopeful and \"will end up where I am made to go\".   "

## 2019-08-04 PROCEDURE — 12400001 ZZH R&B MH UMMC

## 2019-08-04 PROCEDURE — 25000132 ZZH RX MED GY IP 250 OP 250 PS 637: Performed by: PSYCHIATRY & NEUROLOGY

## 2019-08-04 PROCEDURE — 25000132 ZZH RX MED GY IP 250 OP 250 PS 637: Performed by: NURSE PRACTITIONER

## 2019-08-04 PROCEDURE — 25000132 ZZH RX MED GY IP 250 OP 250 PS 637: Performed by: PHYSICIAN ASSISTANT

## 2019-08-04 RX ADMIN — LIDOCAINE 1 PATCH: 560 PATCH PERCUTANEOUS; TOPICAL; TRANSDERMAL at 21:15

## 2019-08-04 RX ADMIN — QUETIAPINE FUMARATE 50 MG: 50 TABLET ORAL at 15:58

## 2019-08-04 RX ADMIN — OLANZAPINE 10 MG: 10 TABLET, FILM COATED ORAL at 11:34

## 2019-08-04 RX ADMIN — FAMOTIDINE 20 MG: 20 TABLET, FILM COATED ORAL at 07:59

## 2019-08-04 RX ADMIN — DULOXETINE HYDROCHLORIDE 30 MG: 30 CAPSULE, DELAYED RELEASE ORAL at 07:58

## 2019-08-04 RX ADMIN — IBUPROFEN 600 MG: 600 TABLET ORAL at 19:08

## 2019-08-04 RX ADMIN — HYDROXYZINE HYDROCHLORIDE 50 MG: 50 TABLET, FILM COATED ORAL at 07:57

## 2019-08-04 RX ADMIN — NICOTINE POLACRILEX 4 MG: 4 GUM, CHEWING ORAL at 15:59

## 2019-08-04 RX ADMIN — FAMOTIDINE 20 MG: 20 TABLET, FILM COATED ORAL at 21:16

## 2019-08-04 RX ADMIN — NICOTINE POLACRILEX 4 MG: 4 GUM, CHEWING ORAL at 19:08

## 2019-08-04 RX ADMIN — GABAPENTIN 1000 MG: 400 CAPSULE ORAL at 14:15

## 2019-08-04 RX ADMIN — PROPRANOLOL HYDROCHLORIDE 10 MG: 10 TABLET ORAL at 21:16

## 2019-08-04 RX ADMIN — HYDROXYZINE HYDROCHLORIDE 50 MG: 50 TABLET, FILM COATED ORAL at 14:14

## 2019-08-04 RX ADMIN — GABAPENTIN 1000 MG: 400 CAPSULE ORAL at 21:16

## 2019-08-04 RX ADMIN — PROPRANOLOL HYDROCHLORIDE 10 MG: 10 TABLET ORAL at 07:59

## 2019-08-04 RX ADMIN — NICOTINE POLACRILEX 4 MG: 4 GUM, CHEWING ORAL at 14:14

## 2019-08-04 RX ADMIN — NICOTINE POLACRILEX 4 MG: 4 GUM, CHEWING ORAL at 21:16

## 2019-08-04 RX ADMIN — THIAMINE HCL TAB 100 MG 100 MG: 100 TAB at 07:58

## 2019-08-04 RX ADMIN — TRAZODONE HYDROCHLORIDE 50 MG: 50 TABLET ORAL at 21:15

## 2019-08-04 RX ADMIN — NICOTINE POLACRILEX 4 MG: 4 GUM, CHEWING ORAL at 10:15

## 2019-08-04 RX ADMIN — NICOTINE POLACRILEX 4 MG: 4 GUM, CHEWING ORAL at 07:57

## 2019-08-04 RX ADMIN — GABAPENTIN 1000 MG: 400 CAPSULE ORAL at 07:58

## 2019-08-04 RX ADMIN — ACAMPROSATE CALCIUM 666 MG: 333 TABLET, DELAYED RELEASE ORAL at 14:15

## 2019-08-04 RX ADMIN — ESCITALOPRAM OXALATE 20 MG: 20 TABLET ORAL at 07:58

## 2019-08-04 RX ADMIN — FOLIC ACID 1 MG: 1 TABLET ORAL at 07:58

## 2019-08-04 RX ADMIN — MULTIPLE VITAMINS W/ MINERALS TAB 1 TABLET: TAB at 07:58

## 2019-08-04 RX ADMIN — NICOTINE POLACRILEX 4 MG: 4 GUM, CHEWING ORAL at 11:35

## 2019-08-04 RX ADMIN — ACAMPROSATE CALCIUM 666 MG: 333 TABLET, DELAYED RELEASE ORAL at 21:15

## 2019-08-04 RX ADMIN — IBUPROFEN 600 MG: 600 TABLET ORAL at 04:51

## 2019-08-04 RX ADMIN — QUETIAPINE FUMARATE 50 MG: 50 TABLET ORAL at 04:51

## 2019-08-04 RX ADMIN — ACAMPROSATE CALCIUM 666 MG: 333 TABLET, DELAYED RELEASE ORAL at 07:58

## 2019-08-04 RX ADMIN — HYDROXYZINE HYDROCHLORIDE 50 MG: 50 TABLET, FILM COATED ORAL at 19:08

## 2019-08-04 ASSESSMENT — ACTIVITIES OF DAILY LIVING (ADL)
HYGIENE/GROOMING: INDEPENDENT
LAUNDRY: UNABLE TO COMPLETE
HYGIENE/GROOMING: INDEPENDENT
LAUNDRY: WITH SUPERVISION
ORAL_HYGIENE: INDEPENDENT
DRESS: STREET CLOTHES
DRESS: INDEPENDENT
ORAL_HYGIENE: INDEPENDENT

## 2019-08-04 ASSESSMENT — MIFFLIN-ST. JEOR: SCORE: 1347.17

## 2019-08-05 PROCEDURE — G0177 OPPS/PHP; TRAIN & EDUC SERV: HCPCS

## 2019-08-05 PROCEDURE — 25000132 ZZH RX MED GY IP 250 OP 250 PS 637: Performed by: PSYCHIATRY & NEUROLOGY

## 2019-08-05 PROCEDURE — 25000132 ZZH RX MED GY IP 250 OP 250 PS 637: Performed by: NURSE PRACTITIONER

## 2019-08-05 PROCEDURE — 99232 SBSQ HOSP IP/OBS MODERATE 35: CPT | Performed by: PSYCHIATRY & NEUROLOGY

## 2019-08-05 PROCEDURE — 12400001 ZZH R&B MH UMMC

## 2019-08-05 PROCEDURE — 25000132 ZZH RX MED GY IP 250 OP 250 PS 637: Performed by: PHYSICIAN ASSISTANT

## 2019-08-05 RX ADMIN — FOLIC ACID 1 MG: 1 TABLET ORAL at 08:01

## 2019-08-05 RX ADMIN — PROPRANOLOL HYDROCHLORIDE 10 MG: 10 TABLET ORAL at 21:11

## 2019-08-05 RX ADMIN — DULOXETINE HYDROCHLORIDE 30 MG: 30 CAPSULE, DELAYED RELEASE ORAL at 08:00

## 2019-08-05 RX ADMIN — IBUPROFEN 600 MG: 600 TABLET ORAL at 16:11

## 2019-08-05 RX ADMIN — OLANZAPINE 10 MG: 10 TABLET, FILM COATED ORAL at 18:45

## 2019-08-05 RX ADMIN — QUETIAPINE FUMARATE 50 MG: 50 TABLET ORAL at 05:42

## 2019-08-05 RX ADMIN — GABAPENTIN 1000 MG: 400 CAPSULE ORAL at 08:00

## 2019-08-05 RX ADMIN — OLANZAPINE 10 MG: 10 TABLET, FILM COATED ORAL at 08:10

## 2019-08-05 RX ADMIN — NICOTINE POLACRILEX 4 MG: 4 GUM, CHEWING ORAL at 21:12

## 2019-08-05 RX ADMIN — TRAZODONE HYDROCHLORIDE 50 MG: 50 TABLET ORAL at 21:11

## 2019-08-05 RX ADMIN — MULTIPLE VITAMINS W/ MINERALS TAB 1 TABLET: TAB at 08:00

## 2019-08-05 RX ADMIN — FAMOTIDINE 20 MG: 20 TABLET, FILM COATED ORAL at 08:00

## 2019-08-05 RX ADMIN — NICOTINE POLACRILEX 4 MG: 4 GUM, CHEWING ORAL at 08:00

## 2019-08-05 RX ADMIN — NICOTINE POLACRILEX 4 MG: 4 GUM, CHEWING ORAL at 16:12

## 2019-08-05 RX ADMIN — THIAMINE HCL TAB 100 MG 100 MG: 100 TAB at 08:01

## 2019-08-05 RX ADMIN — QUETIAPINE FUMARATE 50 MG: 50 TABLET ORAL at 13:32

## 2019-08-05 RX ADMIN — TRAZODONE HYDROCHLORIDE 50 MG: 50 TABLET ORAL at 22:55

## 2019-08-05 RX ADMIN — GABAPENTIN 1000 MG: 400 CAPSULE ORAL at 21:11

## 2019-08-05 RX ADMIN — IBUPROFEN 600 MG: 600 TABLET ORAL at 05:42

## 2019-08-05 RX ADMIN — GABAPENTIN 1000 MG: 400 CAPSULE ORAL at 13:32

## 2019-08-05 RX ADMIN — PROPRANOLOL HYDROCHLORIDE 10 MG: 10 TABLET ORAL at 08:00

## 2019-08-05 RX ADMIN — ACAMPROSATE CALCIUM 666 MG: 333 TABLET, DELAYED RELEASE ORAL at 08:00

## 2019-08-05 RX ADMIN — HYDROXYZINE HYDROCHLORIDE 50 MG: 50 TABLET, FILM COATED ORAL at 16:12

## 2019-08-05 RX ADMIN — NICOTINE POLACRILEX 4 MG: 4 GUM, CHEWING ORAL at 13:32

## 2019-08-05 RX ADMIN — ESCITALOPRAM OXALATE 20 MG: 20 TABLET ORAL at 08:01

## 2019-08-05 RX ADMIN — ACAMPROSATE CALCIUM 666 MG: 333 TABLET, DELAYED RELEASE ORAL at 21:11

## 2019-08-05 RX ADMIN — ACAMPROSATE CALCIUM 666 MG: 333 TABLET, DELAYED RELEASE ORAL at 13:32

## 2019-08-05 ASSESSMENT — ACTIVITIES OF DAILY LIVING (ADL)
HYGIENE/GROOMING: INDEPENDENT
DRESS: INDEPENDENT
LAUNDRY: WITH SUPERVISION
ORAL_HYGIENE: INDEPENDENT

## 2019-08-05 NOTE — PROGRESS NOTES
08/05/19 1500   Occupational Therapy   Type of Intervention structured groups   Response Initiates, socially acceptable   Hours 1       Occupational therapy clinic. Pt Response: Demonstrated consistent performance skills as observed on previous dates.

## 2019-08-05 NOTE — PROGRESS NOTES
08/04/19 2200   Behavioral Health   Hallucinations denies / not responding to hallucinations   Thinking distractable   Orientation person: oriented;place: oriented   Memory baseline memory   Insight poor   Judgement impaired   Eye Contact at examiner   Affect full range affect   Mood mood is calm   Physical Appearance/Attire neat   Hygiene well groomed   Suicidality other (see comments)  (denies)   1. Wish to be Dead No   2. Non-Specific Active Suicidal Thoughts  No   Self Injury other (see comment)  (denies)   Activity other (see comment)  (visible in the milieu and socialized with others)   Speech clear;coherent   Activities of Daily Living   Hygiene/Grooming independent   Oral Hygiene independent   Dress independent   Laundry with supervision   Room Organization independent       Pt denied SI and SIB.  Pt are supper, visible in the milieu, attended group and socialized with others.

## 2019-08-05 NOTE — PROGRESS NOTES
"[facility], [place]   Psychiatric Progress Note        Interim History:     The patient's care was discussed with the treatment team during the daily team meeting and/or staff's chart notes were reviewed.  Staff report patient is social and visible. She goes to groups and contributes to the group discussion. Frequently complains of anxiety and asks for PRNs, although, subjectively, doesn't appear to be too anxious. She is visited by her  on almost daily basis. During today visit she asked about using Zyprexa prn for anxiety. I advised her that Zyprexa, while could be used for agitation and severe anxiety, comes with many side effects and should not be used routinely. She was receptive to my words. She denied Suicidal ideation, Homicidal thoughts. Had no other concerns or questions. Said that her main goal for us was: \"to be patient enough and go to treatment and benefit from it\".          Medications:       acamprosate  666 mg Oral TID     DULoxetine  30 mg Oral Daily     escitalopram  20 mg Oral Daily     famotidine  20 mg Oral BID     folic acid  1 mg Oral Daily     gabapentin  1,000 mg Oral TID     lidocaine  1-3 patch Transdermal Q24H     lidocaine   Transdermal Q8H     lidocaine   Transdermal Q24h     menthol   Transdermal Q8H     multivitamin w/minerals  1 tablet Oral Daily     propranolol  10 mg Oral BID     vitamin B1  100 mg Oral Daily          Allergies:     Allergies   Allergen Reactions     Cranberry Extract Hives     Morphine Other (See Comments)     Vomiting     Penicillins Unknown     Sertraline Other (See Comments)     dreams     Unasyn      Celecoxib Rash     Sulfa Drugs Rash          Labs:     No results found for this or any previous visit (from the past 24 hour(s)).       Psychiatric Examination:     BP 98/71   Pulse 100   Temp 97.9  F (36.6  C) (Oral)   Resp 16   Ht 1.575 m (5' 2\")   Wt 73.4 kg (161 lb 12.8 oz)   SpO2 100%   BMI 29.59 kg/m    Weight is " 161 lbs 12.8 oz  Body mass index is 29.59 kg/m .    Orthostatic Vitals       Most Recent      Sitting Orthostatic BP 98/71 08/05 1607    Sitting Orthostatic Pulse (bpm) 100 08/05 1607    Standing Orthostatic /75 08/05 0849    Standing Orthostatic Pulse (bpm) 89 08/05 0849         Appearance: awake, alert and dressed in hospital scrubs  Attitude: cooperative  Eye Contact:  fair  Mood: better   Affect:  animated today  Speech:  WNL  Psychomotor Behavior: no evidence of dystonia and intact station, gait and muscle tone  Throught Process:  goal oriented  Associations:  no loose associations  Thought Content:  no evidence of suicidal ideation or homicidal ideation  Insight:  limited, but improving   Judgement:  limited, but improving   Oriented to:  time, person, and place  Attention Span and Concentration:  fair  Recent and Remote Memory:  fair    Clinical Global Impressions  First: 7/4 7/18/2019      Most recent: 3/2 7/31/2019             Precautions:     Behavioral Orders   Procedures     Code 1 - Restrict to Unit     Elopement precautions     Routine Programming     As clinically indicated     Status 15     Every 15 minutes.     Suicide precautions     Patients on Suicide Precautions should have a Combination Diet ordered that includes a Diet selection(s) AND a Behavioral Tray selection for Safe Tray - with utensils, or Safe Tray - NO utensils            DIagnoses:     1.  Major depressive disorder, recurrent, moderate severity.   2.  Generalized anxiety disorder.   3.  History of panic disorder with agoraphobia.   4.  Alcohol use disorder, severe, with dependence.     5.  Recent alcohol withdrawal symptoms.     6.  Liver cirrhosis.          Plan:   No medication changes today. Will continue to provide support and structure. She is on a Stay off Commitment. Was referred to [facility] and three other programs and put on a waiting list.

## 2019-08-05 NOTE — PROGRESS NOTES
"The pt remains in an upbeat mood while waiting on placement into treatment. During community meeting, the pt did state she was feeling \"anxious and hopeful about leaving soon, but patient and content with her situation.\" She does try to remain positive while here, and has even stated \"due to my agoraphobia, this is now my safe place because I'm used to it.\" She can be seen coloring and socializing in the milieu, and had a meeting with her .     08/05/19 1251   Behavioral Health   Hallucinations denies / not responding to hallucinations   Thinking intact   Orientation person: oriented;time: oriented;date: oriented;place: oriented   Memory baseline memory   Insight admits / accepts;insight appropriate to situation   Judgement intact   Eye Contact at examiner   Affect full range affect   Mood mood is calm   Physical Appearance/Attire neat   Hygiene well groomed   Suicidality other (see comments)  (pt denies)   1. Wish to be Dead No   2. Non-Specific Active Suicidal Thoughts  No   Self Injury other (see comment)  (pt denies)   Elopement   (no concerns)   Activity other (see comment)  (visible, attended some groups)   Speech coherent;clear   Medication Sensitivity no stated side effects;no observed side effects   Psychomotor / Gait balanced;steady     "

## 2019-08-05 NOTE — PROGRESS NOTES
Roseannr spoke with the patient and inquired about if she had spoken with her father over the weekend about possibly paying for treatment. The patient stated that she had not but stated that her father is typically living on a boat from May to September and may have limited cell phone coverage. She stated that she would like to continue pursuing treatment at [facility] as this is her first choice for treatment.     called Yaz, the admissions coordinator for [facility] to inquire about the patient's wait list status.  left a voicemail with this inquiry.

## 2019-08-06 VITALS
DIASTOLIC BLOOD PRESSURE: 71 MMHG | HEART RATE: 100 BPM | TEMPERATURE: 99.7 F | OXYGEN SATURATION: 100 % | RESPIRATION RATE: 18 BRPM | BODY MASS INDEX: 29.77 KG/M2 | WEIGHT: 161.8 LBS | HEIGHT: 62 IN | SYSTOLIC BLOOD PRESSURE: 98 MMHG

## 2019-08-06 PROCEDURE — 25000132 ZZH RX MED GY IP 250 OP 250 PS 637: Performed by: PHYSICIAN ASSISTANT

## 2019-08-06 PROCEDURE — G0177 OPPS/PHP; TRAIN & EDUC SERV: HCPCS

## 2019-08-06 PROCEDURE — 25000132 ZZH RX MED GY IP 250 OP 250 PS 637: Performed by: NURSE PRACTITIONER

## 2019-08-06 PROCEDURE — 25000132 ZZH RX MED GY IP 250 OP 250 PS 637: Performed by: PSYCHIATRY & NEUROLOGY

## 2019-08-06 PROCEDURE — 99239 HOSP IP/OBS DSCHRG MGMT >30: CPT | Performed by: PSYCHIATRY & NEUROLOGY

## 2019-08-06 RX ORDER — FAMOTIDINE 20 MG/1
20 TABLET, FILM COATED ORAL 2 TIMES DAILY
Qty: 60 TABLET | Refills: 0 | Status: SHIPPED | OUTPATIENT
Start: 2019-08-06 | End: 2019-09-05

## 2019-08-06 RX ORDER — TRAZODONE HYDROCHLORIDE 50 MG/1
50-100 TABLET, FILM COATED ORAL
Qty: 45 TABLET | Refills: 0 | Status: SHIPPED | OUTPATIENT
Start: 2019-08-06

## 2019-08-06 RX ORDER — GABAPENTIN 800 MG/1
800 TABLET ORAL 3 TIMES DAILY
Qty: 90 TABLET | Refills: 0 | Status: SHIPPED | OUTPATIENT
Start: 2019-08-06

## 2019-08-06 RX ORDER — HYDROXYZINE HYDROCHLORIDE 50 MG/1
50 TABLET, FILM COATED ORAL EVERY 6 HOURS PRN
Qty: 90 TABLET | Refills: 0 | Status: SHIPPED | OUTPATIENT
Start: 2019-08-06

## 2019-08-06 RX ORDER — DULOXETIN HYDROCHLORIDE 30 MG/1
30 CAPSULE, DELAYED RELEASE ORAL DAILY
Qty: 30 CAPSULE | Refills: 0 | Status: SHIPPED | OUTPATIENT
Start: 2019-08-07

## 2019-08-06 RX ORDER — GABAPENTIN 100 MG/1
200 CAPSULE ORAL 3 TIMES DAILY
Qty: 180 CAPSULE | Refills: 0 | Status: SHIPPED | OUTPATIENT
Start: 2019-08-06 | End: 2019-09-05

## 2019-08-06 RX ORDER — ACAMPROSATE CALCIUM 333 MG/1
666 TABLET, DELAYED RELEASE ORAL 3 TIMES DAILY
Qty: 180 TABLET | Refills: 0 | Status: SHIPPED | OUTPATIENT
Start: 2019-08-06 | End: 2019-09-05

## 2019-08-06 RX ORDER — ESCITALOPRAM OXALATE 20 MG/1
20 TABLET ORAL DAILY
Qty: 30 TABLET | Refills: 0 | Status: SHIPPED | OUTPATIENT
Start: 2019-08-07

## 2019-08-06 RX ADMIN — DULOXETINE HYDROCHLORIDE 30 MG: 30 CAPSULE, DELAYED RELEASE ORAL at 07:53

## 2019-08-06 RX ADMIN — OLANZAPINE 10 MG: 10 TABLET, FILM COATED ORAL at 11:10

## 2019-08-06 RX ADMIN — MENTHOL 1 PATCH: 205.5 PATCH TOPICAL at 07:53

## 2019-08-06 RX ADMIN — ESCITALOPRAM OXALATE 20 MG: 20 TABLET ORAL at 07:53

## 2019-08-06 RX ADMIN — PROPRANOLOL HYDROCHLORIDE 10 MG: 10 TABLET ORAL at 07:53

## 2019-08-06 RX ADMIN — HYDROXYZINE HYDROCHLORIDE 50 MG: 50 TABLET, FILM COATED ORAL at 07:56

## 2019-08-06 RX ADMIN — FAMOTIDINE 20 MG: 20 TABLET, FILM COATED ORAL at 07:53

## 2019-08-06 RX ADMIN — FOLIC ACID 1 MG: 1 TABLET ORAL at 07:53

## 2019-08-06 RX ADMIN — ACAMPROSATE CALCIUM 666 MG: 333 TABLET, DELAYED RELEASE ORAL at 07:53

## 2019-08-06 RX ADMIN — GABAPENTIN 1000 MG: 400 CAPSULE ORAL at 07:53

## 2019-08-06 RX ADMIN — QUETIAPINE FUMARATE 50 MG: 50 TABLET ORAL at 06:17

## 2019-08-06 RX ADMIN — ACAMPROSATE CALCIUM 666 MG: 333 TABLET, DELAYED RELEASE ORAL at 13:59

## 2019-08-06 RX ADMIN — GABAPENTIN 1000 MG: 400 CAPSULE ORAL at 13:59

## 2019-08-06 RX ADMIN — IBUPROFEN 600 MG: 600 TABLET ORAL at 06:18

## 2019-08-06 RX ADMIN — MULTIPLE VITAMINS W/ MINERALS TAB 1 TABLET: TAB at 07:53

## 2019-08-06 RX ADMIN — HYDROXYZINE HYDROCHLORIDE 50 MG: 50 TABLET, FILM COATED ORAL at 13:59

## 2019-08-06 RX ADMIN — THIAMINE HCL TAB 100 MG 100 MG: 100 TAB at 07:53

## 2019-08-06 ASSESSMENT — MIFFLIN-ST. JEOR: SCORE: 1347.17

## 2019-08-06 NOTE — PROGRESS NOTES
, again, called Yaz ([phone]) with [facility] to inquire about the patient's spot on the wait list and to see when an admission can be scheduled. , again, left a voicemail for this information and requested a return call.      spoke with Yaz who stated that she will not be able to admit the patient until the week of 8/19.  called Jillian with [facility] to inquire about the patient being on the wait for [facility] and [facility]. Jillian stated that the admissions coordinators for each should have reached out to .  called [facility] and spoke with Jazmyn. Jazmyn stated that she has a bed available for the patient today.  informed the patient and the covering attending. The patient will be picked up by her  today at 2:30pm and is expected to arrive at treatment no later than 8pm. This was confirmed with the treatment facility and told to the patient.

## 2019-08-06 NOTE — PROGRESS NOTES
Writer called the patient's , Wes Porter, to inform him about the patient's discharge today. Wes requested that the patient's discharge paperwork be emailed to him. Writer stated that she would once it was completed.

## 2019-08-06 NOTE — PROGRESS NOTES
Discharge Summary    Pt discharged from station 20 at 2:34 PM on 08/06/19. She is in no acute distress, denies SI/SIB, as well as thoughts of harming others. This writer reviewed discharge paperwork and medications with patient, and she verbalized understanding. Pt in possession of all belongings upon leaving the unit. Pt  picking up pt and driving her to treatment.

## 2019-08-06 NOTE — PROGRESS NOTES
"Pt visible in the milieu for the majority of the day. She displays a slightly irritable demeanor with this writer, but is pleasant and social with fellow patients.    Pt took all scheduled medications without issue, and requested hydroxyzine prn with her morning medications. Pt reported that she is \"super agitated\", requesting Zyprexa prn.     Pt appears to lack coping skills beyond seeking medications for anxiety management.   "

## 2019-08-06 NOTE — PROGRESS NOTES
Patient reports continued stress and anxiety, but denies SI/SIB. Patient reports her divorce is causing her distress and that she has experienced cravings for alcohol. Patient was visible and social with full range affect during the shift, but occassionally tearful.     08/05/19 2300   Behavioral Health   Hallucinations denies / not responding to hallucinations   Thinking intact   Orientation person: oriented;place: oriented;date: oriented;time: oriented   Memory baseline memory   Insight admits / accepts   Judgement intact   Eye Contact at examiner   Affect full range affect   Mood anxious   Physical Appearance/Attire appears stated age;attire appropriate to age and situation   Hygiene well groomed   Suicidality other (see comments)  (Denies)   1. Wish to be Dead No   2. Non-Specific Active Suicidal Thoughts  No   Self Injury other (see comment)  (Denies)   Activity other (see comment)  (Visible and social.)   Speech clear;coherent   Medication Sensitivity no stated side effects;no observed side effects   Psychomotor / Gait balanced;steady   Activities of Daily Living   Hygiene/Grooming independent   Oral Hygiene independent   Dress independent   Laundry with supervision   Room Organization independent

## 2019-08-06 NOTE — DISCHARGE SUMMARY
[facility]  Department of Psychiatry    DATE OF ADMISSION:  7/17/2019    DATE OF DISCHARGE:  August 6, 2019    DISCHARGE DIAGNOSES:   1.  Major depressive disorder, recurrent, moderate  2.  Generalized anxiety disorder.   3.  Panic disorder with agoraphobia.   4.  Alcohol use disorder, severe  5.  Liver cirrhosis.     HOSPITAL COURSE: (Refer to H&P, progress notes, and consult notes for details)    The patient was admitted to our Behavioral Health Unit under a 72-hour hold from an outside hospital where she had presented in a state of alcohol intoxication while reporting depressed mood and suicidal ideation.  She was admitted to our behavioral health unit on station 20 under the care of Dr. Bahena who managed the majority of the patient's care.  Cymbalta was used to address mood and anxiety symptoms while resuming Campral to assist with treatment of her alcohol use disorder.  She was safely detox from alcohol without complication.  Her mood improved and she no longer reported suicidal ideation.  She worked with the unit  on gaining referrals for residential chemical addiction treatment.  On the day where Dr. Bahena was absent, a bed became available to her at the treatment facility.  I met with the patient to ensure mental health stability on the day of her discharge.  She reported stable mood and a desire to pursue this treatment plan and disposition.  She adamantly denied suicidal ideation.  She reported tolerating her medications without trouble.  Noting that she did not meet criteria to be placed under an involuntary hold, she was discharged from our behavioral health unit while transitioning her care onto outpatient providers and onto the residential treatment facility.    Other interventions received during his hospitalization included:   Psychosocial treatments were addressed with groups, social work consult, and supportive milieu provided by  staff.    CONDITION AT DISCHARGE:  Improved.  The patients acute suicide risk is low due to the following factors:  improved mood/anxiety symptoms.  Denies suicidal ideations. Denies psychotic symptoms.  Not actively intoxicated and plans to abstain from illicit substances and alcohol.  Denies access to guns.  Denies feeling hopeless or helpless. At the time of discharge Lima Renteria was determined to not be an immediate danger to herself or others. The patient's acute risk will be higher if noncompliant with treatment plan, medications, follow-up or using illicit substances or alcohol.  These findings along with the risks of noncompliance with medications and treatment plan, which could potentially cause decompensation and increase the risk for suicide, were discussed with the patient.  The patients chronic suicide risk is moderate given the following factors: white race; diagnosis of MDD, history of chemical dependency; Denied a family history of suicide.  Preventative factors include: social supports, stable housing     MENTAL STATUS EXAMINATION AT TIME OF DISCHARGE:  The patient is 43 year old White female who appears their stated age and is appropriately dressed with good hygiene.  Calm and cooperative with the interview questions.  No psychomotor abnormalities are noted. Eye contact is appropriate. Speech has normal rate, tone, latency and volume and is not pushed or pressured. Mood is euthymic and affect is full and appropriate.  The patient does not seem overtly depressed, anxious, manic or irritable.  Thought process is linear, logical and future oriented.  Thought process is not tangential, circumstantial or disorganized.  Thought content is not significant for apperant paranoia, delusions, ideas of reference or grandiosity.  The patient denies suicidal and homicidal ideations as well as auditory and visual hallucinations.  Insight and judgment are fair.  Cognition appears intact to interviewing including  orientation, recent and remote memory, fund of knowledge, use of language, attention span and concentration.  Muscle strength, tone and gait appear normal on visual inspection.      DISPOSITION:  The patient is discharged to the [facility] treatment facility for chemical addiction treatment.    FOLLOWUP APPOINTMENTS:  ( per social workers notes and after visit summary)  Medication Management   Monday, September 9th at 10:30am with Yue Maradiaga  [facility]  [facility], [address]  [place] [address]  Phone: [phone]  The agency asks that you arrive at least 30 minutes prior to your appointment to complete new patient paperwork. Please be sure to present your ID and insurance card to the staff at the reception area. If you need to reschedule this appointment for any reason, please be sure to do so at least 24 hours in advance.      Case Management   Please continue to work with your mental health , Wes. If you need to contact him directly please do so by calling [phone] or [phone].      Primary Care   Provider: CATALINA Mishra   [address] , [address]   [place] [address]  Phone: [phone]   Please schedule an appointment with this provider following your completion with treatment.      Outpatient Therapy  Provider: Eli Yoder   [address] [place] [address]   Phone: [phone]  Please schedule an appointment with this provider following your completion of treatment.    DISCHARGE MEDICATIONS:   Current Discharge Medication List      START taking these medications    Details   DULoxetine (CYMBALTA) 30 MG capsule Take 1 capsule (30 mg) by mouth daily  Qty: 30 capsule, Refills: 0    Associated Diagnoses: Moderate episode of recurrent major depressive disorder (H); Anxiety      traZODone (DESYREL) 50 MG tablet Take 1-2 tablets ( mg) by mouth nightly as needed for sleep  Qty: 45 tablet, Refills: 0     Associated Diagnoses: Primary insomnia         CONTINUE these medications which have CHANGED    Details   acamprosate (CAMPRAL) 333 MG EC tablet Take 2 tablets (666 mg) by mouth 3 times daily  Qty: 180 tablet, Refills: 0    Associated Diagnoses: Alcohol use disorder, severe, dependence (H)      escitalopram (LEXAPRO) 20 MG tablet Take 1 tablet (20 mg) by mouth daily  Qty: 30 tablet, Refills: 0    Associated Diagnoses: Moderate episode of recurrent major depressive disorder (H)      famotidine (PEPCID) 20 MG tablet Take 1 tablet (20 mg) by mouth 2 times daily  Qty: 60 tablet, Refills: 0    Associated Diagnoses: Gastroesophageal reflux disease with esophagitis      gabapentin (NEURONTIN) 100 MG capsule Take 2 capsules (200 mg) by mouth 3 times daily Take with the 800 mg capsule for total of 1000 mg 3 times a day  Qty: 180 capsule, Refills: 0    Associated Diagnoses: Anxiety      gabapentin (NEURONTIN) 800 MG tablet Take 1 tablet (800 mg) by mouth 3 times daily ;Take with two 100 mg capsules 3 times a day for a total of 1000 mg 3 times a day.  Qty: 90 tablet, Refills: 0    Associated Diagnoses: Anxiety      hydrOXYzine (ATARAX) 50 MG tablet Take 1 tablet (50 mg) by mouth every 6 hours as needed for anxiety  Qty: 90 tablet, Refills: 0    Associated Diagnoses: Anxiety         CONTINUE these medications which have NOT CHANGED    Details   IBUPROFEN PO Take 600 mg by mouth every 6 hours as needed for moderate pain         STOP taking these medications       diazepam (VALIUM) 2 MG tablet Comments:   Reason for Stopping:         hydrOXYzine (VISTARIL) 25 MG capsule Comments:   Reason for Stopping:         ondansetron (ZOFRAN) 4 MG tablet Comments:   Reason for Stopping:         oxyCODONE (ROXICODONE) 5 MG tablet Comments:   Reason for Stopping:         propranolol (INDERAL) 10 MG tablet Comments:   Reason for Stopping:         propranolol (INDERAL) 10 MG tablet Comments:   Reason for Stopping:         THIAMINE HCL PO  Comments:   Reason for Stopping:                LABORATORY RESULTS: (past 14 days)  Recent Results (from the past 336 hour(s))   Hepatic panel    Collection Time: 07/26/19  7:44 AM   Result Value Ref Range    Bilirubin Direct 0.1 0.0 - 0.2 mg/dL    Bilirubin Total 0.3 0.2 - 1.3 mg/dL    Albumin 3.3 (L) 3.4 - 5.0 g/dL    Protein Total 7.5 6.8 - 8.8 g/dL    Alkaline Phosphatase 191 (H) 40 - 150 U/L     (H) 0 - 50 U/L    AST 55 (H) 0 - 45 U/L       >30 minutes was spent on this discharge to allow for reviewing the patient's response to treatment, reviewing plan of care, education on medications and diagnosis, and conducting a risk assessment.

## 2019-08-06 NOTE — DISCHARGE INSTRUCTIONS
Behavioral Discharge Planning and Instructions    Summary:  You were admitted on 7/17/2019  due to Alcohol Withdrawal and Suicidal Ideations.  You were treated by Dr. Noe Bahena MD and discharged on 08/06/2019 from Station [facility] to Chemical Dependency Treatment at [facility] [address] [facility] [address].     Principal Diagnosis:   Major depressive disorder, recurrent, moderate severity  Generalized anxiety disorder   History of panic disorder with agoraphobia   Alcohol use disorder, severe, with dependence   Liver Cirrhosis    Health Care Follow-up Appointments:  Medication Management   Monday, September 9th at 10:30am with Yue Maradiaga  [facility]  [facility], [address]  [place] [address]  Phone: [phone]  The agency asks that you arrive at least 30 minutes prior to your appointment to complete new patient paperwork. Please be sure to present your ID and insurance card to the staff at the reception area. If you need to reschedule this appointment for any reason, please be sure to do so at least 24 hours in advance.     Case Management   Please continue to work with your mental health , Wes. If you need to contact him directly please do so by calling [phone] or [phone].     Primary Care   Provider: CATALINA Mishra   [address] [facility] , [address]   [place] [address]  Phone: [phone]   Please schedule an appointment with this provider following your completion with treatment.     Outpatient Therapy  Provider: Eli Yoder   [address] [place] [address]   Phone: [phone]  Please schedule an appointment with this provider following your completion of treatment.    Attend all scheduled appointments with your outpatient providers. Call at least 24 hours in advance if you need to reschedule an appointment to ensure continued access to your outpatient providers.   Major Treatments,  "Procedures and Findings:  You were provided with: a psychiatric assessment, medication evaluation and/or management, group therapy and milieu management    Symptoms to Report: feeling more aggressive, increased confusion, losing more sleep, mood getting worse or thoughts of suicide    Early warning signs can include: increased depression or anxiety sleep disturbances increased thoughts or behaviors of suicide or self-harm  increased unusual thinking, such as paranoia or hearing voices    Safety and Wellness:  Take all medicines as directed.  Make no changes unless your doctor suggests them. Follow treatment recommendations. Refrain from alcohol and non-prescribed drugs.  Ask your support system to help you reduce your access to items that could harm yourself or others. Items could include:    - Firearms  - Medicines (both prescribed and over-the-counter)  - Knives and other sharp objects  - Ropes and like materials  - Car keys  If there is a concern for safety, call 911. If there is a concern for safety, call 911.    Resources:   [facility] Crisis Response - Adult [phone]  Crisis Intervention: 293.209.4829 or 275-397-7203 (TTY: 967.729.7173).  Call anytime for help.  National [place] on Mental Illness (www.[place].uzair.org): 753.282.1940 or 658-580-6621.  [place] Association for Children's Mental Health (www.mac.org): 579.434.6468.  Alcoholics Anonymous (www.alcoholics-anonymous.org): Check your phone book for your local chapter.  Suicide Awareness Voices of Education (SAVE) (www.save.org): 007-994-UCVJ (5325)  National Suicide Prevention Line (www.mentalhealthmn.org): 783-461-ALCC (9495)  Mental Health Consumer/Survivor Network of [place] (www.mhcsn.net): 322.361.7284 or 572-743-3415  Mental Health Association of [place] (www.mentalhealth.org): 951.749.9517 or 746-906-9188  Text 4 Life: txt \"LIFE\" to 10248 for immediate support and crisis intervention  Crisis text line: Text \"[place]\" to 612010. Free, confidential, 24/7.  Crisis " Intervention: 753.415.3523 or 230-607-8396. Call anytime for help.     The treatment team has appreciated the opportunity to work with you.     Lima,  please take care and make your recovery a daily recovery.   If you have any questions or concerns our unit number is [phone].   You may be receiving a follow-up phone call within the next three days from a representative from behavioral health.

## 2019-08-07 RX ORDER — OLANZAPINE 5 MG/1
5 TABLET ORAL 3 TIMES DAILY PRN
Qty: 90 TABLET | Refills: 0 | Status: SHIPPED | OUTPATIENT
Start: 2019-08-07

## 2019-08-07 RX ORDER — QUETIAPINE FUMARATE 50 MG/1
50 TABLET, FILM COATED ORAL 3 TIMES DAILY PRN
Qty: 90 TABLET | Refills: 0 | Status: SHIPPED | OUTPATIENT
Start: 2019-08-07

## 2019-09-09 NOTE — PROGRESS NOTES
08/01/19 1500   Occupational Therapy   Type of Intervention structured groups   Response Initiates, socially acceptable   Hours 3     Attended 3/3 occupational therapy groups offered this date: OT Clinic, Leisure, and Chair Yoga. Overall bright affect and full engagement.       no

## 2021-03-15 NOTE — PROGRESS NOTES
07/24/19 1440   General Information   Date Initially Attended OT 07/24/19     Attended 2/2 occupational therapy groups offered this date. Overall congruent affect and full engagement.      Occupation-based coping skill exploration group through movement to facilitate relaxation and stress management via chair yoga and 10-minute guided meditation. Education was provided on the use of yoga practice as a coping tool within daily/weekly routine. Pt Response: Reported previous experience with yoga and meditation with positive results as well as regular integration within personal daily routine. Appeared proud while sharing her knowledge and experiences with this topic. Independently followed and engaged in all of the yoga poses.    Pt's first attendance in Occupational Therapy Clinic. Pt Response: I to initiate attendance to work on her personal coloring page at the window. Demonstrating good focus and appropriate socialization; respectful and encouraging toward others.    OT staff will meet with pt to review the role of occupational therapy and explain the value of having them involved in their treatment plan including options to meet current needs/self-identified goals. As group attendance is established, Pt will be given self-assessment to inform OT initial assessment.       Debridement Text: The wound edges were debrided prior to proceeding with the closure to facilitate wound healing.

## 2021-05-28 ENCOUNTER — RECORDS - HEALTHEAST (OUTPATIENT)
Dept: ADMINISTRATIVE | Facility: CLINIC | Age: 46
End: 2021-05-28

## 2021-05-29 ENCOUNTER — RECORDS - HEALTHEAST (OUTPATIENT)
Dept: ADMINISTRATIVE | Facility: CLINIC | Age: 46
End: 2021-05-29

## 2021-08-31 NOTE — PLAN OF CARE
"43 year year old female pt admitted from [facility] on a 72 hr hold due to SI.  Pt was admitted in  [facility] for alcohol withdrawal.  Pt also has a history of DTs during withdrawal.   Pt has a history of  suicide attempt in March when she tried to hang herself.  According to report, pt just completed treatment in a rehab for alcohol abuse but resumed drinking immediately upon discharge.  During the admission process pt reported that she has agoraphobia, liver cirrhosis and that she is in so much pain and would like to see the doctor.  Pt stated \"I was on Dilaudid  when I was in [facility]. And am also on medication for my alcohol withdrawal.\"  Pt was calm and co-operative with the search protocol and denies SI.  Brief tour of the unit given.      " Ear Wedge Repair Text: A wedge excision was completed by carrying down an excision through the full thickness of the ear and cartilage with an inward facing Burow's triangle. The wound was then closed in a layered fashion.

## 2022-05-23 NOTE — ANESTHESIA PROCEDURE NOTES
Peripheral nerve/Neuraxial procedure note : lumbar puncture  Pre-Procedure  Performed by  KEL ANGUIANO   Location: ED    Procedure Times:6/30/2018 12:48 PM and 6/30/2018 1:03 PM  Pre-Anesthestic Checklist: patient identified, risks and benefits discussed, informed consent, monitors and equipment checked, pre-op evaluation and at physician/surgeon's request    Timeout  Correct Patient: Yes   Correct Procedure: Yes   Correct Site: Yes   Correct Laterality: N/A   Correct Position: Yes   Site Marked: N/A   .   Procedure Documentation  ASA 2  .    Procedure:    Lumbar puncture.  Insertion Site:L4-5  (midline approach)      Patient Prep;mask, sterile gloves, povidone-iodine 7.5% surgical scrub, patient draped.  .  Needle: Radha tip Spinal Needle (gauge): 25  Spinal/LP Needle Length (inches): 3.5 # of attempts: 1 and # of redirects:  No introducer used .       Assessment/Narrative  Paresthesias: No.  .  .  clear CSF fluid removed while sitting   .          PAST MEDICAL HISTORY:  Afib     Atrial fibrillation and flutter     CML (chronic myelocytic leukemia)     Congenital polycystic kidney     H/O aortic valve stenosis     Obesity

## 2024-02-06 NOTE — PROGRESS NOTES
-- DO NOT REPLY / DO NOT REPLY ALL --  -- Message is from [facility] ([facility]) --    General Patient Message:     Enma is calling to inform Dr. Hanna tomas that patient started the partial hospital program today.      Caller Information         Type Contact Phone/Fax    02/06/2024 02:24 PM CST Phone (Incoming) Enma [phone]     [facility]          Alternative phone number: none     Can a detailed message be left? Yes    Message Turnaround:     Is it Working Hours? Yes - Working Hours                      The patient stated that she would like to consider going to [facility] or [place].  sent the patient's referral for both of these facilities. The patient would also like to know what she would have to pay out of pocket for [facility].      called [facility] to see how much the patient would be responsible for out of pocket costs.  spoke with an admissions coordinator, Ely. Who stated that she will have to call in and see what the costs would be for the patient.  provided her contact information and requested a return call.      received a return call from Ely with [facility]. She stated that the out of pocket costs for the patient to go into treatment would be $4300. She stated that if the patient is willing to move forward with those out of pocket costs, they would have a bed available next Thursday or Friday. The patient was given this information and stated that she was going to call her father and see if he would be willing to pay the costs.      spoke with the patient who requested that  send the referral to [facility]. The patient signed an CHRISTY, which has been placed in the patient's chart.  faxed the referral to the admissions coordinator.  also called Ely to let her know that the referral was coming. Ely stated that she would be on the lookout for the referral and requested the number where she could do a phone screen with the patient.  provided her with the number to the unit.